# Patient Record
Sex: FEMALE | Race: WHITE | NOT HISPANIC OR LATINO | Employment: FULL TIME | URBAN - METROPOLITAN AREA
[De-identification: names, ages, dates, MRNs, and addresses within clinical notes are randomized per-mention and may not be internally consistent; named-entity substitution may affect disease eponyms.]

---

## 2014-02-13 LAB — BNP SERPL-MCNC: 310 PG/ML

## 2015-10-29 LAB
CHOLEST SERPL-MCNC: 186 MG/DL
HDLC SERPL-MCNC: 66 MG/DL
LDLC SERPL CALC-MCNC: 104 MG/DL
NONHDLC SERPL-MCNC: 120 MG/DL
TRIGL SERPL-MCNC: 81 MG/DL

## 2017-01-05 ENCOUNTER — TRANSFERRED RECORDS (OUTPATIENT)
Dept: HEALTH INFORMATION MANAGEMENT | Facility: CLINIC | Age: 57
End: 2017-01-05

## 2017-01-05 LAB
ABSOLUTE BASOPHILS (EXTERNAL): 0.02 X10(9)/L (ref 0–0.3)
EOSINOPHIL # BLD AUTO: 0.1 X10(9)/L (ref 0.05–0.5)
ERYTHROCYTE [DISTWIDTH] IN BLOOD BY AUTOMATED COUNT: 12.3 % (ref 11.9–15.5)
HCT VFR BLD AUTO: 38.3 % (ref 34.9–44.5)
HEMOGLOBIN: 13 G/DL (ref 12–15.5)
LYMPHOCYTES # BLD AUTO: 1.09 X10(9)/L (ref 0.9–2.9)
MCH RBC QN AUTO: NORMAL PG
MCHC RBC AUTO-ENTMCNC: NORMAL G/DL
MCV RBC AUTO: 92.7 FL (ref 81.6–98.3)
MONOCYTES # BLD AUTO: 0.34 X10(9)/L (ref 0.3–0.9)
NEUTROPHILS # BLD AUTO: 5.16 X10(9)/L (ref 1.7–7)
PLATELET COUNT - QUEST: NORMAL 10^9/L (ref 150–450)
RBC # BLD AUTO: 4.13 10^12/L (ref 3.9–5.03)
WBC # BLD AUTO: 6.7 10^9/L (ref 3.5–10.5)

## 2017-02-09 ENCOUNTER — TRANSFERRED RECORDS (OUTPATIENT)
Dept: HEALTH INFORMATION MANAGEMENT | Facility: CLINIC | Age: 57
End: 2017-02-09

## 2017-02-09 LAB
ALBUMIN SERPL-MCNC: 3.8 G/DL (ref 3.5–5.7)
ALP SERPL-CCNC: 48 U/L (ref 34–104)
ALT SERPL-CCNC: 27 U/L (ref 7–52)
ANION GAP SERPL CALCULATED.3IONS-SCNC: 12.7 MMOL/L (ref 8–16)
AST SERPL-CCNC: 31 U/L (ref 13–39)
BASOPHILS NFR BLD AUTO: 1 % (ref 0–2)
BILIRUB SERPL-MCNC: 0.5 MG/DL (ref 0–1)
BUN SERPL-MCNC: 10 MG/DL (ref 7–25)
CALCIUM SERPL-MCNC: 8.7 MG/DL (ref 8.6–10.8)
CHLORIDE SERPLBLD-SCNC: 106 MMOL/L (ref 98–107)
CO2 SERPL-SCNC: 24 MMOL/L (ref 21–31)
CREAT SERPL-MCNC: 0.93 MG/DL (ref 0.6–1.2)
EOSINOPHIL NFR BLD AUTO: 2 % (ref 0–4)
ERYTHROCYTE [DISTWIDTH] IN BLOOD BY AUTOMATED COUNT: 12 % (ref 11.9–15.5)
GFR SERPL CREATININE-BSD FRML MDRD: >60 ML/MIN/1.73M2
GLUCOSE SERPL-MCNC: 103 MG/DL (ref 70–99)
HCT VFR BLD AUTO: 37.6 % (ref 36–46)
HEMOGLOBIN: 12.8 G/DL (ref 12–16)
INR PPP: 1.1
LYMPHOCYTES NFR BLD AUTO: 36 % (ref 20–44)
MAGNESIUM SERPL-MCNC: 2 MG/DL (ref 1.9–2.7)
MCH RBC QN AUTO: 31 PG (ref 26–34)
MCHC RBC AUTO-ENTMCNC: 34 G/DL (ref 31–37)
MCV RBC AUTO: 92 FL (ref 80–100)
MONOCYTES NFR BLD AUTO: 10 % (ref 2–9)
NEUTROPHILS NFR BLD AUTO: 47 % (ref 50–70)
PLATELET COUNT - QUEST: 174 10^9/L (ref 150–440)
POTASSIUM SERPL-SCNC: 3.7 MMOL/L (ref 3.5–5.1)
PROT SERPL-MCNC: 6 G/DL (ref 6.4–8.3)
PROTHROMBIN TIME: 11.9 SECONDS (ref 9.9–11.8)
RBC # BLD AUTO: 4.11 10^12/L (ref 4.2–5.4)
SODIUM SERPL-SCNC: 139 MMOL/L (ref 136–145)
TSH SERPL-ACNC: 2.8 MCU/ML (ref 0.34–5.6)
WBC # BLD AUTO: 3.3 10^9/L (ref 4.5–11)

## 2017-02-10 ENCOUNTER — TRANSFERRED RECORDS (OUTPATIENT)
Dept: HEALTH INFORMATION MANAGEMENT | Facility: CLINIC | Age: 57
End: 2017-02-10

## 2017-05-16 ENCOUNTER — TRANSFERRED RECORDS (OUTPATIENT)
Dept: HEALTH INFORMATION MANAGEMENT | Facility: CLINIC | Age: 57
End: 2017-05-16

## 2017-06-27 ENCOUNTER — CARE COORDINATION (OUTPATIENT)
Dept: CARDIOLOGY | Facility: CLINIC | Age: 57
End: 2017-06-27

## 2017-06-27 DIAGNOSIS — Z98.890 HISTORY OF PULMONIC VALVULOTOMY: ICD-10-CM

## 2017-06-27 DIAGNOSIS — Z87.74 H/O ATRIAL SEPTAL DEFECT REPAIR: ICD-10-CM

## 2017-06-27 DIAGNOSIS — Z87.74 S/P PDA REPAIR: Primary | ICD-10-CM

## 2017-10-02 ENCOUNTER — PRE VISIT (OUTPATIENT)
Dept: CARDIOLOGY | Facility: CLINIC | Age: 57
End: 2017-10-02

## 2017-10-02 DIAGNOSIS — Q24.9 CONGENITAL ANOMALIES OF THE HEART: ICD-10-CM

## 2017-10-02 DIAGNOSIS — I48.92 ATRIAL FLUTTER (H): Primary | ICD-10-CM

## 2017-10-02 RX ORDER — LABETALOL 200 MG/1
300 TABLET, FILM COATED ORAL 2 TIMES DAILY
Status: ON HOLD | COMMUNITY
End: 2018-04-09

## 2017-10-02 RX ORDER — FERROUS SULFATE 325(65) MG
325 TABLET ORAL EVERY OTHER DAY
COMMUNITY
End: 2024-09-03

## 2017-10-13 ENCOUNTER — OFFICE VISIT (OUTPATIENT)
Dept: CARDIOLOGY | Facility: CLINIC | Age: 57
End: 2017-10-13
Attending: INTERNAL MEDICINE
Payer: COMMERCIAL

## 2017-10-13 ENCOUNTER — HOSPITAL ENCOUNTER (OUTPATIENT)
Dept: MRI IMAGING | Facility: CLINIC | Age: 57
End: 2017-10-13
Attending: INTERNAL MEDICINE
Payer: COMMERCIAL

## 2017-10-13 ENCOUNTER — HOSPITAL ENCOUNTER (OUTPATIENT)
Dept: MRI IMAGING | Facility: CLINIC | Age: 57
Discharge: HOME | End: 2017-10-13
Attending: INTERNAL MEDICINE | Admitting: INTERNAL MEDICINE
Payer: COMMERCIAL

## 2017-10-13 VITALS
OXYGEN SATURATION: 99 % | WEIGHT: 160.94 LBS | HEIGHT: 66 IN | BODY MASS INDEX: 25.86 KG/M2 | SYSTOLIC BLOOD PRESSURE: 132 MMHG | DIASTOLIC BLOOD PRESSURE: 84 MMHG | HEART RATE: 76 BPM

## 2017-10-13 VITALS
OXYGEN SATURATION: 99 % | HEIGHT: 66 IN | BODY MASS INDEX: 25.86 KG/M2 | HEART RATE: 76 BPM | WEIGHT: 160.9 LBS | DIASTOLIC BLOOD PRESSURE: 84 MMHG | SYSTOLIC BLOOD PRESSURE: 132 MMHG

## 2017-10-13 DIAGNOSIS — Z98.890 HISTORY OF PULMONIC VALVULOTOMY: ICD-10-CM

## 2017-10-13 DIAGNOSIS — Z87.74 H/O ATRIAL SEPTAL DEFECT REPAIR: ICD-10-CM

## 2017-10-13 DIAGNOSIS — Z87.74 S/P PDA REPAIR: ICD-10-CM

## 2017-10-13 DIAGNOSIS — Z87.74 STATUS POST ATRIAL SEPTAL DEFECT REPAIR: ICD-10-CM

## 2017-10-13 DIAGNOSIS — Z98.890 S/P PULMONARY VALVULOTOMY: Primary | ICD-10-CM

## 2017-10-13 DIAGNOSIS — I48.0 PAROXYSMAL ATRIAL FIBRILLATION (H): Primary | ICD-10-CM

## 2017-10-13 DIAGNOSIS — I48.3 TYPICAL ATRIAL FLUTTER (H): ICD-10-CM

## 2017-10-13 LAB
ALBUMIN SERPL-MCNC: 3.8 G/DL (ref 3.4–5)
ALP SERPL-CCNC: 66 U/L (ref 40–150)
ALT SERPL W P-5'-P-CCNC: 28 U/L (ref 0–50)
ANION GAP SERPL CALCULATED.3IONS-SCNC: 6 MMOL/L (ref 3–14)
AST SERPL W P-5'-P-CCNC: 24 U/L (ref 0–45)
BASOPHILS # BLD AUTO: 0 10E9/L (ref 0–0.2)
BASOPHILS NFR BLD AUTO: 0.4 %
BILIRUB SERPL-MCNC: 0.6 MG/DL (ref 0.2–1.3)
BUN SERPL-MCNC: 11 MG/DL (ref 7–30)
CALCIUM SERPL-MCNC: 8.8 MG/DL (ref 8.5–10.1)
CHLORIDE SERPL-SCNC: 102 MMOL/L (ref 94–109)
CHOLEST SERPL-MCNC: 178 MG/DL
CO2 SERPL-SCNC: 28 MMOL/L (ref 20–32)
CREAT SERPL-MCNC: 0.88 MG/DL (ref 0.52–1.04)
DIFFERENTIAL METHOD BLD: NORMAL
EOSINOPHIL # BLD AUTO: 0.1 10E9/L (ref 0–0.7)
EOSINOPHIL NFR BLD AUTO: 2 %
ERYTHROCYTE [DISTWIDTH] IN BLOOD BY AUTOMATED COUNT: 12.1 % (ref 10–15)
GFR SERPL CREATININE-BSD FRML MDRD: 67 ML/MIN/1.7M2
GLUCOSE SERPL-MCNC: 87 MG/DL (ref 70–99)
HCT VFR BLD AUTO: 38.4 % (ref 35–47)
HDLC SERPL-MCNC: 71 MG/DL
HGB BLD-MCNC: 12.6 G/DL (ref 11.7–15.7)
IMM GRANULOCYTES # BLD: 0 10E9/L (ref 0–0.4)
IMM GRANULOCYTES NFR BLD: 0.2 %
LDLC SERPL CALC-MCNC: 90 MG/DL
LYMPHOCYTES # BLD AUTO: 1.1 10E9/L (ref 0.8–5.3)
LYMPHOCYTES NFR BLD AUTO: 20.4 %
MCH RBC QN AUTO: 31.3 PG (ref 26.5–33)
MCHC RBC AUTO-ENTMCNC: 32.8 G/DL (ref 31.5–36.5)
MCV RBC AUTO: 96 FL (ref 78–100)
MONOCYTES # BLD AUTO: 0.4 10E9/L (ref 0–1.3)
MONOCYTES NFR BLD AUTO: 6.8 %
NEUTROPHILS # BLD AUTO: 3.8 10E9/L (ref 1.6–8.3)
NEUTROPHILS NFR BLD AUTO: 70.2 %
NONHDLC SERPL-MCNC: 106 MG/DL
NRBC # BLD AUTO: 0 10*3/UL
NRBC BLD AUTO-RTO: 0 /100
PLATELET # BLD AUTO: 152 10E9/L (ref 150–450)
POTASSIUM SERPL-SCNC: 4 MMOL/L (ref 3.4–5.3)
PROT SERPL-MCNC: 7.4 G/DL (ref 6.8–8.8)
RBC # BLD AUTO: 4.02 10E12/L (ref 3.8–5.2)
SODIUM SERPL-SCNC: 136 MMOL/L (ref 133–144)
TRIGL SERPL-MCNC: 81 MG/DL
TSH SERPL DL<=0.005 MIU/L-ACNC: 3.61 MU/L (ref 0.4–4)
WBC # BLD AUTO: 5.5 10E9/L (ref 4–11)

## 2017-10-13 PROCEDURE — 99204 OFFICE O/P NEW MOD 45 MIN: CPT | Mod: 25 | Performed by: INTERNAL MEDICINE

## 2017-10-13 PROCEDURE — 99212 OFFICE O/P EST SF 10 MIN: CPT | Mod: ZF

## 2017-10-13 PROCEDURE — 85025 COMPLETE CBC W/AUTO DIFF WBC: CPT | Performed by: INTERNAL MEDICINE

## 2017-10-13 PROCEDURE — 36415 COLL VENOUS BLD VENIPUNCTURE: CPT | Performed by: INTERNAL MEDICINE

## 2017-10-13 PROCEDURE — 93010 ELECTROCARDIOGRAM REPORT: CPT | Mod: ZP | Performed by: INTERNAL MEDICINE

## 2017-10-13 PROCEDURE — 80061 LIPID PANEL: CPT | Performed by: INTERNAL MEDICINE

## 2017-10-13 PROCEDURE — 93005 ELECTROCARDIOGRAM TRACING: CPT | Mod: ZF

## 2017-10-13 PROCEDURE — 25000128 H RX IP 250 OP 636: Performed by: INTERNAL MEDICINE

## 2017-10-13 PROCEDURE — 80053 COMPREHEN METABOLIC PANEL: CPT | Performed by: INTERNAL MEDICINE

## 2017-10-13 PROCEDURE — 71555 MRI ANGIO CHEST W OR W/O DYE: CPT

## 2017-10-13 PROCEDURE — 75561 CARDIAC MRI FOR MORPH W/DYE: CPT

## 2017-10-13 PROCEDURE — 99213 OFFICE O/P EST LOW 20 MIN: CPT

## 2017-10-13 PROCEDURE — 84443 ASSAY THYROID STIM HORMONE: CPT | Performed by: INTERNAL MEDICINE

## 2017-10-13 PROCEDURE — A9585 GADOBUTROL INJECTION: HCPCS | Performed by: INTERNAL MEDICINE

## 2017-10-13 RX ORDER — GADOBUTROL 604.72 MG/ML
7.5 INJECTION INTRAVENOUS ONCE
Status: COMPLETED | OUTPATIENT
Start: 2017-10-13 | End: 2017-10-13

## 2017-10-13 RX ADMIN — GADOBUTROL 7.5 ML: 604.72 INJECTION INTRAVENOUS at 09:52

## 2017-10-13 ASSESSMENT — PAIN SCALES - GENERAL
PAINLEVEL: NO PAIN (0)
PAINLEVEL: NO PAIN (0)

## 2017-10-13 NOTE — MR AVS SNAPSHOT
"              After Visit Summary   10/13/2017    Heydi Kelley    MRN: [id number]           Patient Information     Date Of Birth          1960        Visit Information        Provider Department      10/13/2017 12:00 PM Madhu Mccollum MD [facility]        Today's Diagnoses     S/P pulmonary valvulotomy    -  1      Care Instructions    You were seen today in the Adult Congenital and Cardiovascular Genetics Clinic at the [facility].    Cardiology Providers you saw during your visit:  Dr Olivia Mccollum and Dr Rajiv Mitchell    Diagnosis:  History of pulmonary valvotomy, A flutter    Results:  Dr Mccollum reviewed the results of your testing in clinic today    Recommendations:    1.  Continue to eat a heart healthy, low salt diet.  2.  Continue to get 20-30 minutes of aerobic activity, 4-5 days per week.  Examples of aerobic activity include walking, running, swimming, cycling, etc.  3.  Continue to observe good oral hygiene, with regular dental visits.  4.  We will schedule you for a cardiopulmonary stress test.  For this, nothing to eat or drink 3 hours prior.  No caffeine, alcohol or tobacco 12 hours prior.  Please take all of your medications as prescribed the day of your test.  If you would like this to be done at [place], please let us know and we will fax the order there.        Vitals:    10/13/17 1150   BP: 132/84   BP Location: Right arm   Patient Position: Chair   Cuff Size: Adult Regular   Pulse: 76   SpO2: 99%   Weight: 73 kg (160 lb 15 oz)   Height: 1.676 m (5' 6\")       SBE prophylaxis:   Yes____  No__x__    Lifelong Bacterial Endocarditis Prophylaxis:  YES____  NO____    If YES is checked, follow the recommendations outlined below:  1. Take antibiotic(s) prior to interventional procedures or surgeries (dental, respiratory, urologic, gastrointestinal, gynecologic), or instrumental examinations.   2. Observe good oral hygiene daily, as advised by your dentist. Get regular " professional dental care.  3. Keep cuts clean.  4. Infections should be treated promptly.      Exercise restrictions:   Yes____  No__x__         If yes, list restrictions:  Must be allowed to rest if fatigued or SOB      Work restrictions:  Yes____  No__x__         If yes, list restrictions:    FASTING CHOLESTEROL was checked in the last 5 years YES_x__  NO___  2017  Continue to eat a heart healthy, low salt diet.         ____ Fasting lipid panel order today         ____ No changes in medications          ____ I recommend the following changes in your cholesterol medications.:          ____ Please follow up for cholesterol screening at your primary care physician        Follow-up:  Follow up with Dr Mccollum in 6 months with a cardiopulmonary stress test    For after hours urgent needs, call [phone] and ask to speak to the Adult Congenital Physician on call.  Mention Job Code 0401.    For emergencies call 911.    For any scheduling needs, please call Rosalio Sotomayor, Procedure , at [phone]  Thank you for your visit today!  If you have questions or concerns about today's visit, please call me.    Tru Peterson RN, BSN  Cardiology Care Coordinator  [facility]  [phone]    [address]  Mail Code [id number]  [place] [address]            Follow-ups after your visit        Your next 10 appointments already scheduled     Apr 13, 2018 10:30 AM CDT   Card Cardpul Stress Tst Adult with [facility]   [facility] ([facility])    [address]  [facility] [address]               Apr 13, 2018  1:00 PM CDT   (Arrive by 12:45 PM)   RETURN CONGENITAL HEART with Madhu Mccollum MD   [facility] ([facility])    [address]  3rd Floor  [facility] [address] [phone]              Future tests that were ordered for you today     Open Future Orders        Priority  "Expected Expires Ordered    Card Cardiopulmonary stress test - adult Routine  11/27/2018 10/13/2017            Who to contact     If you have questions or need follow up information about today's clinic visit or your schedule please contact [facility] directly at [phone].  Normal or non-critical lab and imaging results will be communicated to you by MyChart, letter or phone within 4 business days after the clinic has received the results. If you do not hear from us within 7 days, please contact the clinic through [employer] or phone. If you have a critical or abnormal lab result, we will notify you by phone as soon as possible.  Submit refill requests through [employer] or call your pharmacy and they will forward the refill request to us. Please allow 3 business days for your refill to be completed.          Additional Information About Your Visit        [employer] Information     [employer] gives you secure access to your electronic health record. If you see a primary care provider, you can also send messages to your care team and make appointments. If you have questions, please call your primary care clinic.  If you do not have a primary care provider, please call [phone] and they will assist you.        Care EveryWhere ID     This is your Care EveryWhere ID. This could be used by other organizations to access your [place] medical records  [id number]        Your Vitals Were     Pulse Height Pulse Oximetry BMI (Body Mass Index)          76 1.676 m (5' 6\") 99% 25.98 kg/m2         Blood Pressure from Last 3 Encounters:   10/13/17 132/84   10/13/17 132/84    Weight from Last 3 Encounters:   10/13/17 73 kg (160 lb 15 oz)   10/13/17 73 kg (160 lb 14.4 oz)               Primary Care Provider Office Phone # Fax #    Nora Elias [phone]       [address] [address]        Equal Access to Services     GIL FINLEY: galen Chen, " nataly larsenalonel pendletonbryn leeerica cho. So [facility] [phone].    ATENCIÓN: Si habla rayna, tiene a burgos disposición servicios gratuitos de asistencia lingüística. Llame al [phone].    We comply with applicable federal civil rights laws and [place] laws. We do not discriminate on the basis of race, color, national origin, age, disability, sex, sexual orientation, or gender identity.            Thank you!     Thank you for choosing [facility]  for your care. Our goal is always to provide you with excellent care. Hearing back from our patients is one way we can continue to improve our services. Please take a few minutes to complete the written survey that you may receive in the mail after your visit with us. Thank you!             Your Updated Medication List - Protect others around you: Learn how to safely use, store and throw away your medicines at www.disposemymeds.org.          This list is accurate as of: 10/13/17  1:22 PM.  Always use your most recent med list.                   Brand Name Dispense Instructions for use Diagnosis    ferrous sulfate 325 (65 FE) MG tablet    IRON     Take 325 mg by mouth every other day        labetalol 200 MG tablet    NORMODYNE     Take 300 mg by mouth 2 times daily        XARELTO PO      Take 20 mg by mouth daily (with dinner)

## 2017-10-13 NOTE — LETTER
10/13/2017      RE: Heydi Kelley  [address] [place] [address]       Dear Colleague,    Thank you for the opportunity to participate in the care of your patient, Heydi Kelley, at the [facility]. Please see a copy of my visit note below.    Electrophysiology Clinic Note  HPI:   Ms. Kelley is a 57 year old female who has a past medical history significant for congenital pulmonary stenosis ASD and PDA s/p multiple pulmonary valvulotomy (1962, 1964, 1968, and 1983) with closure of PDA 1968 and closure on ASD 1983,now with severe pulmonary valve insufficiency, HTN, HLD, scoliosis, and PAF/AFL (CHADSVASC 2 on Xarelto) s/p DCCVs last 2017. She presents today to establish care with A[facility]D program re her pulmonary valve and AF/AFL.      She has now known severe pulmonary valve insufficiency. Her RV size and function and remained normal. She is without activity limitations. She began to develop AF/AFL in 2016. She was seen by EP at [facility] who started her on Xarelto. She then underwent cardiac event monitoring which showed brief paroxysms of AT. She then had recurrences of AF/AFL and had DCCV in 7/2016 and 2/2017. Given her atrial arrhythmias and severe RA enlargement, decision was made to replace pulmonary valve. This was scheduled to be done in 1/2017 at [facility], but when she followed up in just prior to valve surgery decision was made to cancel surgery considering no recurrent events since her DCCV in 7/2016. She then had another episode of AF/AFL requiring DCCV in 2/2017. She reports symptoms of palpitations and decreased stamina when in AF/AFL. Of note, she did present to OSH ER once with symptoms of palpitation and was found to be in sinus; however, the other times she was in AF/AFL. Review of 12 lead ECGs show she has had both AF (7/2016) and AFL (2/2017). Her last cardiopulmonary stress test, however, was at [facility] in 02/2014, and she went 6.7  "minutes with a functional aerobic capacity of 72.8%.  Her RER was 1.15, peak VO2 was 73% of predicted at 20.1 mL/kg/minute.  She had normal blood pressure and heart rate response to exercise. She has had longstanding HTN which has been under adequate control with labetalol. CMRI today shows moderate to severe pulmonic insufficiency due to restricted closure of the right pulmoniccusp. There is branch stenosis of the proximal left pulmonary artery (1.0 x 0.8 cm). Borderline reduced LV systolic function (LVEF 55%) and normal RV systolic function (RVEF 75%). She reports feeling well today. She denies any chest pain/pressures, dizziness, lightheadedness, shortness of breath, leg/ankle swelling, PND, orthopnea, or syncopal symptoms. Presenting 12 lead ECG shows SR with PACs Vent Rate 59 bpm,  ms, QRS 76 ms, QTc 417 ms. Current cardiac medications include: Xarelto and Labetalol.        PAST MEDICAL HISTORY:  Past Medical History:   Diagnosis Date     Atrial flutter (H)      Hyperlipidemia      Hypertension      Pulmonary artery stenosis      S/P atrial septal defect closure      S/P PDA repair      S/P pulmonary valvulotomy        CURRENT MEDICATIONS:  Current Outpatient Prescriptions   Medication Sig Dispense Refill     ferrous sulfate (IRON) 325 (65 FE) MG tablet Take 325 mg by mouth every other day       labetalol (NORMODYNE) 200 MG tablet Take 300 mg by mouth 2 times daily       Rivaroxaban (XARELTO PO) Take 20 mg by mouth daily (with dinner)         PAST SURGICAL HISTORY:  s/p multiple pulmonary valvulotomy (1962, 1964, 1968, and 1983) with closure of PDA 1968 and closure on ASD 1983  ALLERGIES:   Not on File    FAMILY HISTORY:  Early CAD    SOCIAL HISTORY:  Social History   Substance Use Topics     Smoking status: Never Smoker     Smokeless tobacco: Never Used     Alcohol use Not on file       ROS:   A comprehensive 10 point review of systems negative other than as mentioned in HPI.  Exam:  Ht 1.676 m (5' 6\")  " Wt 73 kg (160 lb 14.4 oz)  BMI 25.97 kg/m2  GENERAL APPEARANCE: healthy, alert and no distress  HEENT: no icterus, no xanthelasmas, normal pupil size and reaction, normal palate, mucosa moist, no central cyanosis  NECK: no adenopathy, no asymmetry, masses, or scars, thyroid normal to palpation and no bruits, JVP not elevated  RESPIRATORY: lungs clear to auscultation - no rales, rhonchi or wheezes, no use of accessory muscles, no retractions, respirations are unlabored, normal respiratory rate  CARDIOVASCULAR: regular rhythm, normal S1 with physiologic split S2, no S3 or S4 and II/IV diastolic murmur.  ABDOMEN: soft, non tender, bowel sounds normal  EXTREMITIES: peripheral pulses normal, no edema, no bruits  NEURO: alert and oriented to person/place/time, normal speech, gait and affect  SKIN: no ecchymoses, no rashes    Labs:  Reviewed.     Testing/Procedures:  10/2017 CMRI:  Comparison CMR: none     1. The LV is normal in cavity size and wall thickness. The global systolic function is borderline reduced.  The LVEF is 55%. There are no regional wall motion abnormalities.     2. The RV is normal in cavity size. The global systolic function is normal. The RVEF is 75%.      3. Moderate right atrial enlargement. Normal left atrial size.     4. There is moderate to severe pulmonic insufficiency. The pulmonic valve is tricuspid. There is restricted  closure of the right cusp.     5. Late gadolinium enhancement imaging shows no MI, fibrosis or infiltrative disease.      MRA Pulmonary Artery     1. The main pulmonary artery and proximal right pulmonary artery are normal size. There is a severe focal  stenosis of the proximal left pulmonary artery (the lumen measures 1.0 x 0.8 cm).     CONCLUSIONS: Moderate to severe pulmonic insufficiency due to restricted closure of the right pulmonic  cusp. There is branch stenosis of the proximal left pulmonary artery (1.0 x 0.8 cm). Borderline reduced LV  systolic function (LVEF 55%)  and normal RV systolic function (RVEF 75%).    Assessment and Plan:   Ms. Kelley is a 57 year old female who has a past medical history significant for congenital pulmonary stenosis ASD and PDA s/p multiple pulmonary valvulotomy (, , , and ) with closure of PDA  and closure on ASD ,now with severe pulmonary valve insufficiency, HTN, HLD, scoliosis, and PAF/AFL (CHADSVASC 2 on Xarelto) s/p DCCVs last 2017. She presents today to establish care with [facility]D program re her pulmonary valve and AF/AFL.  She has now known severe pulmonary valve insufficiency. Her RV size and function and remained normal. She began to develop AF/AFL in  requiring DCCV in 2016 and 2017. CMRI today shows moderate to severe pulmonic insufficiency due to restricted closure of the right pulmoniccusp. There is branch stenosis of the proximal left pulmonary artery (1.0 x 0.8 cm). Borderline reduced LV systolic function (LVEF 55%) and normal RV systolic function (RVEF 75%).     We discussed in detail with the patient management/treatment options for Catarina includin. Stroke Prophylaxis:  CHADSVASC=+HTN, +gender  2, corresponding to a 2.2% annual stroke / systemic emolism event rate. indicating need for long term oral anticoagulation.  She is appropriately on Xarelto. No bleeding issues.   2. Rate Control: Continue labetalol .   3. Rhythm Control: Cardioversion, Antiarrhythmics and/or ablation are options for rhythm control. She has had DCCV for AF 2016 and DCCV for AFL 2017. We discussed that is she developed frequent episodes of AF we would consider starting AAT. If she develops recurrent AFL, we would prefer ablation for management. At this time, we would not perform any further interventions given infrequency of her occurances.   4. Risk Factor Management: maintain tight BP control, and BEV evaluation as indicated.       She has also established care today with Dr. Mccollum regarding her pulmonary insufficiency  who plans to perform CPX and follow up with patient in 6 months.   We will plan to see patient as needed.     The patient states understanding and is agreeable with plan.   This patient was seen and evaluated with Dr. Mitchell. The above note reflects our joint assessment and plan.   JOSUE Salinas CNP  Pager: [phone]    EP STAFF NOTE  I have seen and examined the patient as part of a shared visit with Paulina Meehan, FRANCIS NP.  I agree with the note above. I reviewed today's vital signs and medications. I have reviewed and discussed with the advanced practice provider their physical examination, assessment, and plan   Briefly, previous ASD/PDA and mainly PS s/p multiple valvulotomies, now with severe PI and atrial arrhythmias, mostly one afib episode and maybe 2 AFL episodes, last in Jan-Feb 2017, symptomatic, well rate controled.  My key history/exam findings are: RRR.   The key management decisions made by me: potential candidate for PV replacement (surgical versus transcatheter), seeing Dr Mccollum for that consideration. We discussed AAT and ablation of recurrences, if ends up having surgical PV replacement, can consider MAZE with CTI cryo too, if transcatheter then would wait for recurrence to treat based on rhythm as above..    Rajiv Mitchell MD [facility]  Cardiology - Electrophysiology    CC  SELF, REFERRED

## 2017-10-13 NOTE — PATIENT INSTRUCTIONS
"You were seen today in the Adult Congenital and Cardiovascular Genetics Clinic at the [facility].    Cardiology Providers you saw during your visit:  Dr Olivia Mccollum and Dr Rajiv Mitchell    Diagnosis:  History of pulmonary valvotomy, A flutter    Results:  Dr Mccollum reviewed the results of your testing in clinic today    Recommendations:    1.  Continue to eat a heart healthy, low salt diet.  2.  Continue to get 20-30 minutes of aerobic activity, 4-5 days per week.  Examples of aerobic activity include walking, running, swimming, cycling, etc.  3.  Continue to observe good oral hygiene, with regular dental visits.  4.  We will schedule you for a cardiopulmonary stress test.  For this, nothing to eat or drink 3 hours prior.  No caffeine, alcohol or tobacco 12 hours prior.  Please take all of your medications as prescribed the day of your test.  If you would like this to be done at [place], please let us know and we will fax the order there.        Vitals:    10/13/17 1150   BP: 132/84   BP Location: Right arm   Patient Position: Chair   Cuff Size: Adult Regular   Pulse: 76   SpO2: 99%   Weight: 73 kg (160 lb 15 oz)   Height: 1.676 m (5' 6\")       SBE prophylaxis:   Yes____  No__x__    Lifelong Bacterial Endocarditis Prophylaxis:  YES____  NO____    If YES is checked, follow the recommendations outlined below:  1. Take antibiotic(s) prior to interventional procedures or surgeries (dental, respiratory, urologic, gastrointestinal, gynecologic), or instrumental examinations.   2. Observe good oral hygiene daily, as advised by your dentist. Get regular professional dental care.  3. Keep cuts clean.  4. Infections should be treated promptly.      Exercise restrictions:   Yes____  No__x__         If yes, list restrictions:  Must be allowed to rest if fatigued or SOB      Work restrictions:  Yes____  No__x__         If yes, list restrictions:    FASTING CHOLESTEROL was checked in the last 5 years YES_x__  NO___  " 2017  Continue to eat a heart healthy, low salt diet.         ____ Fasting lipid panel order today         ____ No changes in medications          ____ I recommend the following changes in your cholesterol medications.:          ____ Please follow up for cholesterol screening at your primary care physician        Follow-up:  Follow up with Dr Mccollum in 6 months with a cardiopulmonary stress test    For after hours urgent needs, call [phone] and ask to speak to the Adult Congenital Physician on call.  Mention Job Code 0401.    For emergencies call 911.    For any scheduling needs, please call Rosalio Sotomayor Procedure , at [phone]  Thank you for your visit today!  If you have questions or concerns about today's visit, please call me.    Tru Peterson RN, BSN  Cardiology Care Coordinator  [facility]  [phone]    [address]  Mail Code [id number]  [place] [address]

## 2017-10-13 NOTE — NURSING NOTE
Cardiac Testing: Patient given instructions regarding CPX. Discussed purpose, preparation, procedure and when to expect results reported back to the patient. Patient demonstrated understanding of this information and agreed to call with further questions or concerns.    Med Reconcile: Reviewed and verified all current medications with the patient. The updated medication list was printed and given to the patient.    Labs: Patient was given results of the laboratory testing obtained today. Patient demonstrated understanding of this information and agreed to call with further questions or concerns.       Return Appointment: Follow up with Dr Mccollum in 6 mo with CPX.  Patient given instructions regarding scheduling next clinic visit. Patient demonstrated understanding of this information and agreed to call with further questions or concerns.    Patient stated she understood all health information given and agreed to call with further questions or concerns.    Tru Peterson, RN  RN Care Coordinator  [facility]  [phone]

## 2017-10-13 NOTE — PATIENT INSTRUCTIONS
"You were seen today in the Adult Congenital and Cardiovascular Genetics Clinic at the [facility].    Cardiology Providers you saw during your visit:  Dr Olivia Mccollum and Dr Rajiv Mitchell    Diagnosis:  History of pulmonary valvotomy, A flutter    Results:  Dr Mccollum reviewed the results of your testing in clinic today    Recommendations:    1.  Continue to eat a heart healthy, low salt diet.  2.  Continue to get 20-30 minutes of aerobic activity, 4-5 days per week.  Examples of aerobic activity include walking, running, swimming, cycling, etc.  3.  Continue to observe good oral hygiene, with regular dental visits.  4.  We will schedule you for a cardiopulmonary stress test.  For this, nothing to eat or drink 3 hours prior.  No caffeine, alcohol or tobacco 12 hours prior.  Please take all of your medications as prescribed the day of your test.  If you would like this to be done at [place], please let us know and we will fax the order there.        Vitals:    10/13/17 1150   BP: 132/84   BP Location: Right arm   Patient Position: Chair   Cuff Size: Adult Regular   Pulse: 76   SpO2: 99%   Weight: 73 kg (160 lb 15 oz)   Height: 1.676 m (5' 6\")       SBE prophylaxis:   Yes____  No__x__    Lifelong Bacterial Endocarditis Prophylaxis:  YES____  NO____    If YES is checked, follow the recommendations outlined below:  1. Take antibiotic(s) prior to interventional procedures or surgeries (dental, respiratory, urologic, gastrointestinal, gynecologic), or instrumental examinations.   2. Observe good oral hygiene daily, as advised by your dentist. Get regular professional dental care.  3. Keep cuts clean.  4. Infections should be treated promptly.      Exercise restrictions:   Yes____  No__x__         If yes, list restrictions:  Must be allowed to rest if fatigued or SOB      Work restrictions:  Yes____  No__x__         If yes, list restrictions:    FASTING CHOLESTEROL was checked in the last 5 years YES_x__  NO___  " 2017  Continue to eat a heart healthy, low salt diet.         ____ Fasting lipid panel order today         ____ No changes in medications          ____ I recommend the following changes in your cholesterol medications.:          ____ Please follow up for cholesterol screening at your primary care physician        Follow-up:  Follow up with Dr Mccollum in 6 months with a cardiopulmonary stress test    For after hours urgent needs, call [phone] and ask to speak to the Adult Congenital Physician on call.  Mention Job Code 0401.    For emergencies call 911.    For any scheduling needs, please call Rosalio Sotomayor Procedure , at [phone]  Thank you for your visit today!  If you have questions or concerns about today's visit, please call me.    Tru Peterson RN, BSN  Cardiology Care Coordinator  [facility]  [phone]    [address]  Mail [id number]  [place] [address]

## 2017-10-13 NOTE — PROGRESS NOTES
"HPI:     Please see dictated note    PAST MEDICAL HISTORY:  Past Medical History:   Diagnosis Date     Atrial flutter (H)      Hyperlipidemia      Hypertension      Pulmonary artery stenosis      S/P atrial septal defect closure      S/P PDA repair      S/P pulmonary valvulotomy        CURRENT MEDICATIONS:  Current Outpatient Prescriptions   Medication Sig Dispense Refill     ferrous sulfate (IRON) 325 (65 FE) MG tablet Take 325 mg by mouth every other day       labetalol (NORMODYNE) 200 MG tablet Take 300 mg by mouth 2 times daily       Rivaroxaban (XARELTO PO) Take 20 mg by mouth daily (with dinner)         PAST SURGICAL HISTORY:  No past surgical history on file.    ALLERGIES   Not on File    FAMILY HISTORY:  No family history on file.    SOCIAL HISTORY:  Social History     Social History     Marital status:      Spouse name: N/A     Number of children: N/A     Years of education: N/A     Social History Main Topics     Smoking status: Never Smoker     Smokeless tobacco: Never Used     Alcohol use Not on file     Drug use: Not on file     Sexual activity: Not on file     Other Topics Concern     Not on file     Social History Narrative       ROS:   Constitutional: No fever, chills, or sweats. No weight gain/loss   ENT: No visual disturbance, ear ache, epistaxis, sore throat  Allergies/Immunologic: Negative.   Respiratory: No cough, hemoptysia  Cardiovascular: As per HPI  GI: No nausea, vomiting, hematemesis, melena, or hematochezia  : No urinary frequency, dysuria, or hematuria  Integument: Negative  Psychiatric: Negative  Neuro: Negative  Endocrinology: Negative   Musculoskeletal: Negative    EXAM:  /84 (BP Location: Right arm, Patient Position: Chair, Cuff Size: Adult Regular)  Pulse 76  Ht 1.676 m (5' 6\")  Wt 73 kg (160 lb 15 oz)  SpO2 99%  BMI 25.98 kg/m2  In general, the patient is a pleasant female in no apparent distress.    HEENT: NC/AT.  PERRLA.  EOMI.  Sclerae white, not injected.  " Nares clear.  Pharynx without erythema or exudate.  Dentition intact.    Neck:  Carotids +4/4 bilaterally without bruits.  No jugular venous distension.   Heart: RRR. Normal S1, S2 splits physiologically. There is no heave or lift. There is a 2/4 diastolic murmur best heard in the RUSB.  Lungs: CTA.  No ronchi, wheezes, rales.    Abdomen: Soft, nontender, nondistended.  Extremities: No clubbing, cyanosis, or edema.  Neurologic: Alert and oriented to person/place/time, normal speech, gait and affect  Skin: No petechiae, purpura or rash.    Labs:  LIPID RESULTS:  Lab Results   Component Value Date    CHOL 178 10/13/2017    HDL 71 10/13/2017    LDL 90 10/13/2017    TRIG 81 10/13/2017    NHDL 106 10/13/2017       LIVER ENZYME RESULTS:  Lab Results   Component Value Date    AST 24 10/13/2017    ALT 28 10/13/2017       CBC RESULTS:  Lab Results   Component Value Date    WBC 5.5 10/13/2017    RBC 4.02 10/13/2017    HGB 12.6 10/13/2017    HCT 38.4 10/13/2017    MCV 96 10/13/2017    MCH 31.3 10/13/2017    MCHC 32.8 10/13/2017    RDW 12.1 10/13/2017     10/13/2017       BMP RESULTS:  Lab Results   Component Value Date     10/13/2017    POTASSIUM 4.0 10/13/2017    CHLORIDE 102 10/13/2017    CO2 28 10/13/2017    ANIONGAP 6 10/13/2017    GLC 87 10/13/2017    BUN 11 10/13/2017    CR 0.88 10/13/2017    GFRESTIMATED 67 10/13/2017    GFRESTBLACK 81 10/13/2017    KEMAR 8.8 10/13/2017        A1C RESULTS:  No results found for: A1C    INR RESULTS:  Lab Results   Component Value Date    INR 1.1 02/09/2017     JOSUE Mccollum MD       CC  Patient Care Team:  Nora Elias NP as PCP - Tru Garcia, RN as Nurse Coordinator (Cardiology)  Madhu Mccollum MD as MD (Cardiology)  SELF, REFERRED

## 2017-10-13 NOTE — NURSING NOTE
Cardiac Testing: Patient given instructions regarding CPX. Discussed purpose, preparation, procedure and when to expect results reported back to the patient. Patient demonstrated understanding of this information and agreed to call with further questions or concerns.    Med Reconcile: Reviewed and verified all current medications with the patient. The updated medication list was printed and given to the patient.    Labs: Patient was given results of the laboratory testing obtained today. Patient demonstrated understanding of this information and agreed to call with further questions or concerns.       Return Appointment: Follow up with Dr Mccollum in 6 mo with CPX.  Patient given instructions regarding scheduling next clinic visit. Patient demonstrated understanding of this information and agreed to call with further questions or concerns.    Patient stated she understood all health information given and agreed to call with further questions or concerns.    Tru Peterson, RN  RN Care Coordinator  [facility]  [phone]

## 2017-10-13 NOTE — LETTER
10/13/2017      RE: Heydi Kelley  [address] [facility] [id number]       Dear Colleague,    Thank you for the opportunity to participate in the care of your patient, Heydi Kelley, at the [facility]. Please see a copy of my visit note below.    HPI:       PAST MEDICAL HISTORY:  Past Medical History:   Diagnosis Date     Atrial flutter (H)      Hyperlipidemia      Hypertension      Pulmonary artery stenosis      S/P atrial septal defect closure      S/P PDA repair      S/P pulmonary valvulotomy        CURRENT MEDICATIONS:  Current Outpatient Prescriptions   Medication Sig Dispense Refill     ferrous sulfate (IRON) 325 (65 FE) MG tablet Take 325 mg by mouth every other day       labetalol (NORMODYNE) 200 MG tablet Take 300 mg by mouth 2 times daily       Rivaroxaban (XARELTO PO) Take 20 mg by mouth daily (with dinner)         PAST SURGICAL HISTORY:  No past surgical history on file.    ALLERGIES   Not on File    FAMILY HISTORY:  No family history on file.    SOCIAL HISTORY:  Social History     Social History     Marital status:      Spouse name: N/A     Number of children: N/A     Years of education: N/A     Social History Main Topics     Smoking status: Never Smoker     Smokeless tobacco: Never Used     Alcohol use Not on file     Drug use: Not on file     Sexual activity: Not on file     Other Topics Concern     Not on file     Social History Narrative       ROS:   Constitutional: No fever, chills, or sweats. No weight gain/loss   ENT: No visual disturbance, ear ache, epistaxis, sore throat  Allergies/Immunologic: Negative.   Respiratory: No cough, hemoptysia  Cardiovascular: As per HPI  GI: No nausea, vomiting, hematemesis, melena, or hematochezia  : No urinary frequency, dysuria, or hematuria  Integument: Negative  Psychiatric: Negative  Neuro: Negative  Endocrinology: Negative   Musculoskeletal: Negative    EXAM:  /84 (BP  "Location: Right arm, Patient Position: Chair, Cuff Size: Adult Regular)  Pulse 76  Ht 1.676 m (5' 6\")  Wt 73 kg (160 lb 15 oz)  SpO2 99%  BMI 25.98 kg/m2  In general, the patient is a pleasant female in no apparent distress.    HEENT: NC/AT.  PERRLA.  EOMI.  Sclerae white, not injected.  Nares clear.  Pharynx without erythema or exudate.  Dentition intact.    Neck:  Carotids +4/4 bilaterally without bruits.  No jugular venous distension.   Heart: RRR. Normal S1, S2 splits physiologically. There is no heave or lift. There is a 2/4 diastolic murmur best heard in the RUSB.  Lungs: CTA.  No ronchi, wheezes, rales.    Abdomen: Soft, nontender, nondistended.  Extremities: No clubbing, cyanosis, or edema.  Neurologic: Alert and oriented to person/place/time, normal speech, gait and affect  Skin: No petechiae, purpura or rash.    Labs:  LIPID RESULTS:  Lab Results   Component Value Date    CHOL 178 10/13/2017    HDL 71 10/13/2017    LDL 90 10/13/2017    TRIG 81 10/13/2017    NHDL 106 10/13/2017       LIVER ENZYME RESULTS:  Lab Results   Component Value Date    AST 24 10/13/2017    ALT 28 10/13/2017       CBC RESULTS:  Lab Results   Component Value Date    WBC 5.5 10/13/2017    RBC 4.02 10/13/2017    HGB 12.6 10/13/2017    HCT 38.4 10/13/2017    MCV 96 10/13/2017    MCH 31.3 10/13/2017    MCHC 32.8 10/13/2017    RDW 12.1 10/13/2017     10/13/2017       BMP RESULTS:  Lab Results   Component Value Date     10/13/2017    POTASSIUM 4.0 10/13/2017    CHLORIDE 102 10/13/2017    CO2 28 10/13/2017    ANIONGAP 6 10/13/2017    GLC 87 10/13/2017    BUN 11 10/13/2017    CR 0.88 10/13/2017    GFRESTIMATED 67 10/13/2017    GFRESTBLACK 81 10/13/2017    KEMAR 8.8 10/13/2017        A1C RESULTS:  No results found for: A1C    INR RESULTS:  Lab Results   Component Value Date    INR 1.1 02/09/2017       HISTORY OF PRESENT ILLNESS:  Ms. Kelley is a pleasant 57-year-old woman who has a past medical history significant for " congenital pulmonary stenosis, atrial septal defect, patent ductus arteriosus.  She underwent her first surgery at 6 weeks of age secondary to a murmur and was found to have critical AS.  This was in 1962.  She had her surgery by Dr. Ross and Dr. Concepcion.  She developed recurrent severe stenosis and had surgery again in 1964 and then again in 1968.  In 1968 it was through a lateral thoracotomy she had patent ductus arteriosus discovered and closed.  Finally, in 1983 she again underwent redo pulmonary valvulotomy and closure of atrial septal defect.  She was followed by Dr. Fowler as a child and started following with [facility] around 2011, where she was under the care of Dr. Larose.      Ms. Kelley has known severe pulmonary valve insufficiency.  However, her right ventricular size and function have been normal.  She was doing well without any concerning symptoms or issues up until 2016 when she had the first episode of atrial fibrillation/flutter.  She was seen by EP at [facility], Dr. Perez, and she was started on anticoagulation.  He put a BodyGuardian on her for remote monitoring, and this showed just brief paroxysmal atrial tachycardia.  Because of her atrial arrhythmias, the decision had been made to replace her pulmonary valve because of the severe right atrial enlargement, and she was scheduled to have this done apparently on 01/07/2017, but when she went for followup she saw Dr. Gill, and they made the decision as she has had no recurrent events since her cardioversion in 07/2016 that they could continue with watchful waiting, so she did not have her valve replaced.  She went on to have another atrial tachycardic event in 02/2017 but has had nothing further since that time.  She reports that she has not been as active as she used to be.  She does walk and does yoga and does not feel that she is limited in what she does.  Her last cardiopulmonary stress test, however, was at [facility] in 02/2014,  and she went 6.7 minutes with a functional aerobic capacity of 72.8%.  Her RER was 1.15, peak VO2 was 73% of predicted at 20.1 mL/kg/minute.  She had normal blood pressure and heart rate response to exercise.      Ms. Kelley denies tobacco use.  She does not have excess caffeine.  She works as a  for the [employer].  She is .  She has 2 children, ages 19 and 16.  She does have a family history of early coronary artery disease with her dad undergoing a CABG in his early 60s.  He was not a smoker and was not diabetic.  She also has a sister, interestingly, with mitral valve prolapse actually needing replacement or repair of her valve.  She denies chest pain or shortness of breath.  She denies palpitations or racing heart.  As part of her evaluation today, she did undergo a cardiac MRI, the results of this are not available, but I have looked at the images personally.  Her right ventricle does not appear to be enlarged.  She does have evidence of pulmonary insufficiency as outlined.  Her lab work including cholesterol was excellent.  TSH was normal, hemoglobin normal, as was her kidney function.  EKG showed sinus rhythm at 69 beats per minute with premature atrial contractions only.      IMPRESSION, REPORT, PLAN:   1.  Congenital pulmonary valve stenosis, status post pulmonary valvuloplasty in 1962, 1964 and 1983 through a median sternotomy, now with severe pulmonary insufficiency with normal right ventricular size and function.   2.  Atrial arrhythmias, initial event in 08/2016 with A-flutter/fib and subsequent event in 02/2016 with severe right atrial enlargement, on Xarelto and labetalol without known recurrence.   3.  History of PDA, status post ligation in 1968 through a lateral thoracotomy at the [facility].   4.  Atrial septal defect, status post closure in 1983 at the [facility].   5.  Hypertension, well controlled.      DISCUSSION:  It was a pleasure being  involved in the care of Mrs. Kelley.  As outlined, I have reviewed her history and symptoms and imaging in detail.  We discussed that with her pulmonary insufficiency, the data that we have in terms of replacing the valve is actually not meant for patients with a history of pulmonary stenosis and valvuloplasties, but rather tetralogy of Fallot, and it is hard to really extrapolate this information in this setting.  There was evidence that she had some impairment when she had her cardiopulmonary stress test in 2014 in which she went 70% of predicted.  We also discussed that her atrial arrhythmias are associated with the pulmonary insufficiency as well.  She has not had a recent cardiopulmonary stress test, and I think that would be helpful to guide us in the next steps.  We did discuss preliminarily pulmonary valve replacement and the types that we do and the timing of doing so.  We also discussed the Hoyt valve as one possibility.  We also had a brief discussion that if she did end up with an open thoracotomy and pulmonary valve replacement that theoretically a Jacqueline valve would be an option in the future.  She understood all of this, is very educated and was glad to have the information.  We also discussed her atrial arrhythmias, and she is under the care of Dr. Mitchell as well, and at this time we are watching her.  I felt that the likelihood of her having recurrent atrial arrhythmias is high, but at this time her burden has been quite low with only 2 events in the past year and a half and that continuing to watch her for a period is not unreasonable.  She does not need antibiotics before dental work.      Finally, we discussed that with all of the information that she has been given that she can think about it and as she is under excellent care at [place], which is closer to her where she lives in [place], but I would be happy to see her back.  We have preliminarily set up a followup visit with a CPX in 6 months,  but she knows that we are always here if she needs anything and we would be happy to see her back sooner if necessary.  All questions were answered.  It was a pleasure to see her.  Please do not hesitate to contact me with any questions or concerns.        Sincerely,     Madhu Mccollum MD      CC  Patient Care Team:  Nora Elias, NP as PCP - General  Tru Peterson, RN as Nurse Coordinator (Cardiology)  Madhu Mccollum MD as MD (Cardiology)  SELF, REFERRED

## 2017-10-13 NOTE — NURSING NOTE
Chief Complaint   Patient presents with     New Patient     heart problem--57 year old female with history of ASD, PDA, A flutter/ Afib, hypertension, hyperlipidemia, and pulmonary stenosis s/p pulmonary valvotomy x 3 presenting for evaluation--needs EKG   Vitals were taken and medication were reconciled. EKG performed.    Erma Rees CMA    11:34 AM

## 2017-10-13 NOTE — PROGRESS NOTES
HISTORY OF PRESENT ILLNESS:  Ms. Kelley is a pleasant 57-year-old woman who has a past medical history significant for congenital pulmonary stenosis, atrial septal defect, patent ductus arteriosus.  She underwent her first surgery at 6 weeks of age secondary to a murmur and was found to have critical AS.  This was in 1962.  She had her surgery by Dr. Ross and Dr. Concepcion.  She developed recurrent severe stenosis and had surgery again in 1964 and then again in 1968.  In 1968 it was through a lateral thoracotomy she had patent ductus arteriosus discovered and closed.  Finally, in 1983 she again underwent redo pulmonary valvulotomy and closure of atrial septal defect.  She was followed by Dr. Fowler as a child and started following with [facility] around 2011, where she was under the care of Dr. Larose.      Ms. Kelley has known severe pulmonary valve insufficiency.  However, her right ventricular size and function have been normal.  She was doing well without any concerning symptoms or issues up until 2016 when she had the first episode of atrial fibrillation/flutter.  She was seen by EP at [facility], Dr. Perez, and she was started on anticoagulation.  He put a BodyGuardian on her for remote monitoring, and this showed just brief paroxysmal atrial tachycardia.  Because of her atrial arrhythmias, the decision had been made to replace her pulmonary valve because of the severe right atrial enlargement, and she was scheduled to have this done apparently on 01/07/2017, but when she went for followup she saw Dr. Gill, and they made the decision as she has had no recurrent events since her cardioversion in 07/2016 that they could continue with watchful waiting, so she did not have her valve replaced.  She went on to have another atrial tachycardic event in 02/2017 but has had nothing further since that time.  She reports that she has not been as active as she used to be.  She does walk and does yoga and does  not feel that she is limited in what she does.  Her last cardiopulmonary stress test, however, was at [facility] in 02/2014, and she went 6.7 minutes with a functional aerobic capacity of 72.8%.  Her RER was 1.15, peak VO2 was 73% of predicted at 20.1 mL/kg/minute.  She had normal blood pressure and heart rate response to exercise.      Ms. Kelley denies tobacco use.  She does not have excess caffeine.  She works as a  for the [employer].  She is .  She has 2 children, ages 19 and 16.  She does have a family history of early coronary artery disease with her dad undergoing a CABG in his early 60s.  He was not a smoker and was not diabetic.  She also has a sister, interestingly, with mitral valve prolapse actually needing replacement or repair of her valve.  She denies chest pain or shortness of breath.  She denies palpitations or racing heart.  As part of her evaluation today, she did undergo a cardiac MRI, the results of this are not available, but I have looked at the images personally.  Her right ventricle does not appear to be enlarged.  She does have evidence of pulmonary insufficiency as outlined.  Her lab work including cholesterol was excellent.  TSH was normal, hemoglobin normal, as was her kidney function.  EKG showed sinus rhythm at 69 beats per minute with premature atrial contractions only.      IMPRESSION, REPORT, PLAN:   1.  Congenital pulmonary valve stenosis, status post pulmonary valvuloplasty in 1962, 1964 and 1983 through a median sternotomy, now with severe pulmonary insufficiency with normal right ventricular size and function.   2.  Atrial arrhythmias, initial event in 08/2016 with A-flutter/fib and subsequent event in 02/2016 with severe right atrial enlargement, on Xarelto and labetalol without known recurrence.   3.  History of PDA, status post ligation in 1968 through a [facility]acotomy at the [facility].   4.  Atrial septal defect, status post closure in  1983 at the [facility].   5.  Hypertension, well controlled.      DISCUSSION:  It was a pleasure being involved in the care of Mrs. Kelley.  As outlined, I have reviewed her history and symptoms and imaging in detail.  We discussed that with her pulmonary insufficiency, the data that we have in terms of replacing the valve is actually not meant for patients with a history of pulmonary stenosis and valvuloplasties, but rather tetralogy of Fallot, and it is hard to really extrapolate this information in this setting.  There was evidence that she had some impairment when she had her cardiopulmonary stress test in 2014 in which she went 70% of predicted.  We also discussed that her atrial arrhythmias are associated with the pulmonary insufficiency as well.  She has not had a recent cardiopulmonary stress test, and I think that would be helpful to guide us in the next steps.  We did discuss preliminarily pulmonary valve replacement and the types that we do and the timing of doing so.  We also discussed the Hoyt valve as one possibility.  We also had a brief discussion that if she did end up with an open thoracotomy and pulmonary valve replacement that theoretically a Jacqueline valve would be an option in the future.  She understood all of this, is very educated and was glad to have the information.  We also discussed her atrial arrhythmias, and she is under the care of Dr. Mitchell as well, and at this time we are watching her.  I felt that the likelihood of her having recurrent atrial arrhythmias is high, but at this time her burden has been quite low with only 2 events in the past year and a half and that continuing to watch her for a period is not unreasonable.  She does not need antibiotics before dental work.      Finally, we discussed that with all of the information that she has been given that she can think about it and as she is under excellent care at [facility], which is closer to her where she lives in  [place], but I would be happy to see her back.  We have preliminarily set up a followup visit with a CPX in 6 months, but she knows that we are always here if she needs anything and we would be happy to see her back sooner if necessary.  All questions were answered.  It was a pleasure to see her.  Please do not hesitate to contact me with any questions or concerns.         JAY SAUCEDO MD             D: 10/13/2017 13:27   T: 10/13/2017 16:16   MT: ZANE      Name:     EDWIN RAPP   MRN:      [id number]        Account:      [id number]   :      1960           Service Date: 10/13/2017      Document: [id number]

## 2017-10-13 NOTE — NURSING NOTE
Chief Complaint   Patient presents with     New Patient     heart problem--57 year old female with history of ASD, PDA, A flutter/ Afib, hypertension, hyperlipidemia, and pulmonary stenosis s/p pulmonary valvotomy x 3 presenting for evaluation--needs EKG

## 2017-10-13 NOTE — MR AVS SNAPSHOT
"              After Visit Summary   10/13/2017    Heydi Kelley    MRN: [id number]           Patient Information     Date Of Birth          1960        Visit Information        Provider Department      10/13/2017 11:30 AM Rajiv Mitchell MD [facility]        Today's Diagnoses     Paroxysmal atrial fibrillation (H)    -  1    Typical atrial flutter (H)        History of pulmonic valvulotomy        S/P PDA repair        Status post atrial septal defect repair          Care Instructions    You were seen today in the Adult Congenital and Cardiovascular Genetics Clinic at the [facility].    Cardiology Providers you saw during your visit:  Dr Olivia Mccollum and Dr Rajiv Mitchell    Diagnosis:  History of pulmonary valvotomy, A flutter    Results:  Dr Mccollum reviewed the results of your testing in clinic today    Recommendations:    1.  Continue to eat a heart healthy, low salt diet.  2.  Continue to get 20-30 minutes of aerobic activity, 4-5 days per week.  Examples of aerobic activity include walking, running, swimming, cycling, etc.  3.  Continue to observe good oral hygiene, with regular dental visits.  4.  We will schedule you for a cardiopulmonary stress test.  For this, nothing to eat or drink 3 hours prior.  No caffeine, alcohol or tobacco 12 hours prior.  Please take all of your medications as prescribed the day of your test.  If you would like this to be done at [facility], please let us know and we will fax the order there.        Vitals:    10/13/17 1150   BP: 132/84   BP Location: Right arm   Patient Position: Chair   Cuff Size: Adult Regular   Pulse: 76   SpO2: 99%   Weight: 73 kg (160 lb 15 oz)   Height: 1.676 m (5' 6\")       SBE prophylaxis:   Yes____  No__x__    Lifelong Bacterial Endocarditis Prophylaxis:  YES____  NO____    If YES is checked, follow the recommendations outlined below:  1. Take antibiotic(s) prior to interventional procedures or surgeries (dental, respiratory, urologic, " gastrointestinal, gynecologic), or instrumental examinations.   2. Observe good oral hygiene daily, as advised by your dentist. Get regular professional dental care.  3. Keep cuts clean.  4. Infections should be treated promptly.      Exercise restrictions:   Yes____  No__x__         If yes, list restrictions:  Must be allowed to rest if fatigued or SOB      Work restrictions:  Yes____  No__x__         If yes, list restrictions:    FASTING CHOLESTEROL was checked in the last 5 years YES_x__  NO___  2017  Continue to eat a heart healthy, low salt diet.         ____ Fasting lipid panel order today         ____ No changes in medications          ____ I recommend the following changes in your cholesterol medications.:          ____ Please follow up for cholesterol screening at your primary care physician        Follow-up:  Follow up with Dr Mccollum in 6 months with a cardiopulmonary stress test    For after hours urgent needs, call [phone] and ask to speak to the Adult Congenital Physician on call.  Mention Job Code 0401.    For emergencies call 131.    For any scheduling needs, please call Rosalio Sotomayor, Procedure , at [phone]  Thank you for your visit today!  If you have questions or concerns about today's visit, please call me.    Tru Peterson RN, BSN  Cardiology Care Coordinator  [facility]  [phone]    [address] [place] [address]  Mail Code [id number]  [place] [address]          Follow-ups after your visit        Follow-up notes from your care team     Return in about 6 months (around 4/13/2018) for ACHD Follow up with Veda enriquez Pulmonary Valve Stenosis.      Your next 10 appointments already scheduled     Apr 13, 2018 10:30 AM CDT   Card Cardpul Stress Tst Adult with [facility]   [facility] ([facility])    [address]  [facility] [address]               Apr 13, 2018  1:00 PM CDT   (Arrive by 12:45 PM)   RETURN  "CONGENITAL HEART with Shamane Olivia Mccollum MD   [facility] ([facility])    [address]  3rd Floor  [facility] [address] [phone]              Who to contact     If you have questions or need follow up information about today's clinic visit or your schedule please contact [facility] directly at [phone].  Normal or non-critical lab and imaging results will be communicated to you by MyChart, letter or phone within 4 business days after the clinic has received the results. If you do not hear from us within 7 days, please contact the clinic through iDoc24hart or phone. If you have a critical or abnormal lab result, we will notify you by phone as soon as possible.  Submit refill requests through SquaredOut or call your pharmacy and they will forward the refill request to us. Please allow 3 business days for your refill to be completed.          Additional Information About Your Visit        [email or web address] Information     SquaredOut gives you secure access to your electronic health record. If you see a primary care provider, you can also send messages to your care team and make appointments. If you have questions, please call your primary care clinic.  If you do not have a primary care provider, please call [phone] and they will assist you.        Care EveryWhere ID     This is your Care EveryWhere ID. This could be used by other organizations to access your [facility] medical records  [id number]        Your Vitals Were     Pulse Height Pulse Oximetry BMI (Body Mass Index)          76 1.676 m (5' 6\") 99% 25.97 kg/m2         Blood Pressure from Last 3 Encounters:   10/13/17 132/84   10/13/17 132/84    Weight from Last 3 Encounters:   10/13/17 73 kg (160 lb 15 oz)   10/13/17 73 kg (160 lb 14.4 oz)              We Performed the Following     EKG 12-lead, tracing only (Future)        Primary Care Provider Office Phone # Fax #    Nora Elias [phone] " [phone]       [place] [address] [facility] [address]        Equal Access to Services     GIL BALL : Hadii aad ku hadbartreginaldo Alexa, wathuda luqadaha, qaybta kaalmada noemi, marsha leein hayaajoe augustinsalma siddiqi laJonatiffany cho. So [facility] [phone].    ATENCIÓN: Si habla español, tiene a burgos disposición servicios gratuitos de asistencia lingüística. Llame al [phone].    We comply with applicable federal civil rights laws and [place] laws. We do not discriminate on the basis of race, color, national origin, age, disability, sex, sexual orientation, or gender identity.            Thank you!     Thank you for choosing [facility]  for your care. Our goal is always to provide you with excellent care. Hearing back from our patients is one way we can continue to improve our services. Please take a few minutes to complete the written survey that you may receive in the mail after your visit with us. Thank you!             Your Updated Medication List - Protect others around you: Learn how to safely use, store and throw away your medicines at www.disposemymeds.org.          This list is accurate as of: 10/13/17 11:59 PM.  Always use your most recent med list.                   Brand Name Dispense Instructions for use Diagnosis    ferrous sulfate 325 (65 FE) MG tablet    IRON     Take 325 mg by mouth every other day        labetalol 200 MG tablet    NORMODYNE     Take 300 mg by mouth 2 times daily        XARELTO PO      Take 20 mg by mouth daily (with dinner)

## 2017-10-13 NOTE — PROGRESS NOTES
Electrophysiology Clinic Note  HPI:   Ms. Kelley is a 57 year old female who has a past medical history significant for congenital pulmonary stenosis ASD and PDA s/p multiple pulmonary valvulotomy (1962, 1964, 1968, and 1983) with closure of PDA 1968 and closure on ASD 1983,now with severe pulmonary valve insufficiency, HTN, HLD, scoliosis, and PAF/AFL (CHADSVASC 2 on Xarelto) s/p DCCVs last 2017. [facility] and AF/AFL.      She has now known severe pulmonary valve insufficiency. Her RV size and function and remained normal. She is without activity limitations. She began to develop AF/AFL in 2016. She was seen by EP at [facility] who started her on Xarelto. She then underwent cardiac event monitoring which showed brief paroxysms of AT. She then had recurrences of AF/AFL and had DCCV in 7/2016 and 2/2017. Given her atrial arrhythmias and severe RA enlargement, decision was made to replace pulmonary valve. This was scheduled to be done in 1/2017 at [facility], but when she followed up in just prior to valve surgery decision was made to cancel surgery considering no recurrent events since her DCCV in 7/2016. She then had another episode of AF/AFL requiring DCCV in 2/2017. She reports symptoms of palpitations and decreased stamina when in AF/AFL. Of note, she did present to OSH ER once with symptoms of palpitation and was found to be in sinus; however, the other times she was in AF/AFL. Review of 12 lead ECGs show she has had both AF (7/2016) and AFL (2/2017). Her last cardiopulmonary stress test, however, was at [facility] in 02/2014, and she went 6.7 minutes with a functional aerobic capacity of 72.8%.  Her RER was 1.15, peak VO2 was 73% of predicted at 20.1 mL/kg/minute.  She had normal blood pressure and heart rate response to exercise. She has had longstanding HTN which has been under adequate control with labetalol. CMRI today shows moderate to severe  "pulmonic insufficiency due to restricted closure of the right pulmoniccusp. There is branch stenosis of the proximal left pulmonary artery (1.0 x 0.8 cm). Borderline reduced LV systolic function (LVEF 55%) and normal RV systolic function (RVEF 75%). She reports feeling well today. She denies any chest pain/pressures, dizziness, lightheadedness, shortness of breath, leg/ankle swelling, PND, orthopnea, or syncopal symptoms. Presenting 12 lead ECG shows SR with PACs Vent Rate 59 bpm,  ms, QRS 76 ms, QTc 417 ms. Current cardiac medications include: Xarelto and Labetalol.        PAST MEDICAL HISTORY:  Past Medical History:   Diagnosis Date     Atrial flutter (H)      Hyperlipidemia      Hypertension      Pulmonary artery stenosis      S/P atrial septal defect closure      S/P PDA repair      S/P pulmonary valvulotomy        CURRENT MEDICATIONS:  Current Outpatient Prescriptions   Medication Sig Dispense Refill     ferrous sulfate (IRON) 325 (65 FE) MG tablet Take 325 mg by mouth every other day       labetalol (NORMODYNE) 200 MG tablet Take 300 mg by mouth 2 times daily       Rivaroxaban (XARELTO PO) Take 20 mg by mouth daily (with dinner)         PAST SURGICAL HISTORY:  s/p multiple pulmonary valvulotomy (1962, 1964, 1968, and 1983) with closure of PDA 1968 and closure on ASD 1983  ALLERGIES:   Not on File    FAMILY HISTORY:  Early CAD    SOCIAL HISTORY:  Social History   Substance Use Topics     Smoking status: Never Smoker     Smokeless tobacco: Never Used     Alcohol use Not on file       ROS:   A comprehensive 10 point review of systems negative other than as mentioned in HPI.  Exam:  Ht 1.676 m (5' 6\")  Wt 73 kg (160 lb 14.4 oz)  BMI 25.97 kg/m2  GENERAL APPEARANCE: healthy, alert and no distress  HEENT: no icterus, no xanthelasmas, normal pupil size and reaction, normal palate, mucosa moist, no central cyanosis  NECK: no adenopathy, no asymmetry, masses, or scars, thyroid normal to palpation and no bruits, " JVP not elevated  RESPIRATORY: lungs clear to auscultation - no rales, rhonchi or wheezes, no use of accessory muscles, no retractions, respirations are unlabored, normal respiratory rate  CARDIOVASCULAR: regular rhythm, normal S1 with physiologic split S2, no S3 or S4 and II/IV diastolic murmur.  ABDOMEN: soft, non tender, bowel sounds normal  EXTREMITIES: peripheral pulses normal, no edema, no bruits  NEURO: alert and oriented to person/place/time, normal speech, gait and affect  SKIN: no ecchymoses, no rashes    Labs:  Reviewed.     Testing/Procedures:  10/2017 CMRI:  Comparison CMR: none     1. The LV is normal in cavity size and wall thickness. The global systolic function is borderline reduced.  The LVEF is 55%. There are no regional wall motion abnormalities.     2. The RV is normal in cavity size. The global systolic function is normal. The RVEF is 75%.      3. Moderate right atrial enlargement. Normal left atrial size.     4. There is moderate to severe pulmonic insufficiency. The pulmonic valve is tricuspid. There is restricted  closure of the right cusp.     5. Late gadolinium enhancement imaging shows no MI, fibrosis or infiltrative disease.      MRA Pulmonary Artery     1. The main pulmonary artery and proximal right pulmonary artery are normal size. There is a severe focal  stenosis of the proximal left pulmonary artery (the lumen measures 1.0 x 0.8 cm).     CONCLUSIONS: Moderate to severe pulmonic insufficiency due to restricted closure of the right pulmonic  cusp. There is branch stenosis of the proximal left pulmonary artery (1.0 x 0.8 cm). Borderline reduced LV  systolic function (LVEF 55%) and normal RV systolic function (RVEF 75%).    Assessment and Plan:   Ms. Kelley is a 57 year old female who has a past medical history significant for congenital pulmonary stenosis ASD and PDA s/p multiple pulmonary valvulotomy (1962, 1964, 1968, and 1983) with closure of PDA 1968 and closure on ASD 1983,now  with severe pulmonary valve insufficiency, HTN, HLD, scoliosis, and PAF/AFL (CHADSVASC 2 on Xarelto) s/p DCCVs last 2017. [facility] and AF/AFL.  She has now known severe pulmonary valve insufficiency. Her RV size and function and remained normal. She began to develop AF/AFL in  requiring DCCV in 2016 and 2017. CMRI today shows moderate to severe pulmonic insufficiency due to restricted closure of the right pulmoniccusp. There is branch stenosis of the proximal left pulmonary artery (1.0 x 0.8 cm). Borderline reduced LV systolic function (LVEF 55%) and normal RV systolic function (RVEF 75%).     We discussed in detail with the patient management/treatment options for Catarina includin. Stroke Prophylaxis:  CHADSVASC=+HTN, +gender  2, corresponding to a 2.2% annual stroke / systemic emolism event rate. indicating need for long term oral anticoagulation.  She is appropriately on Xarelto. No bleeding issues.   2. Rate Control: Continue labetalol .   3. Rhythm Control: Cardioversion, Antiarrhythmics and/or ablation are options for rhythm control. She has had DCCV for AF 2016 and DCCV for AFL 2017. We discussed that is she developed frequent episodes of AF we would consider starting AAT. If she develops recurrent AFL, we would prefer ablation for management. At this time, we would not perform any further interventions given infrequency of her occurances.   4. Risk Factor Management: maintain tight BP control, and BEV evaluation as indicated.       She has also established care today with Dr. Mccollum regarding her pulmonary insufficiency who plans to perform CPX and follow up with patient in 6 months.   We will plan to see patient as needed.     The patient states understanding and is agreeable with plan.   This patient was seen and evaluated with Dr. Mitchell. The above note reflects our joint assessment and plan.   JOSUE Salinas  CNP  Pager: [phone]    EP STAFF NOTE  I have seen and examined the patient as part of a shared visit with Paulina Meehan, EP NP.  I agree with the note above. I reviewed today's vital signs and medications. I have reviewed and discussed with the advanced practice provider their physical examination, assessment, and plan   Briefly, previous ASD/PDA and mainly PS s/p multiple valvulotomies, now with severe PI and atrial arrhythmias, mostly one afib episode and maybe 2 AFL episodes, last in Jan-Feb 2017, symptomatic, well rate controled.  My key history/exam findings are: RRR.   The key management decisions made by me: potential candidate for PV replacement (surgical versus transcatheter), seeing Dr Mccollum for that consideration. We discussed AAT and ablation of recurrences, if ends up having surgical PV replacement, can consider MAZE with CTI cryo too, if transcatheter then would wait for recurrence to treat based on rhythm as above..    Rajiv Mitchell MD [facility]  Cardiology - Electrophysiology    CC  SELF, REFERRED

## 2017-10-13 NOTE — NURSING NOTE
Chief Complaint   Patient presents with     New Patient     heart problem--57 year old female with history of ASD, PDA, A flutter/ Afib, hypertension, hyperlipidemia, and pulmonary stenosis s/p pulmonary valvotomy x 3 presenting for evaluation--needs EKG     Vitals were taken and medications were reconciled.    Erma Rees CMA     11:52 AM

## 2017-10-14 ENCOUNTER — HEALTH MAINTENANCE LETTER (OUTPATIENT)
Age: 57
End: 2017-10-14

## 2017-10-16 LAB — INTERPRETATION ECG - MUSE: NORMAL

## 2017-11-03 ENCOUNTER — CARE COORDINATION (OUTPATIENT)
Dept: CARDIOLOGY | Facility: CLINIC | Age: 57
End: 2017-11-03

## 2017-11-03 DIAGNOSIS — Q25.6 PULMONARY ARTERY STENOSIS: Primary | ICD-10-CM

## 2017-11-03 NOTE — PROGRESS NOTES
Date: 11/3/2017    Time of Call: 4:54 PM     Diagnosis:  Pulmonary artery stenosis     [ TORB ] Ordering provider: Dr Olivia Mccollum  Order: CPX, Lung Perfusion Scan in 3 months with follow up     Order received by: Tru Peterson RN     Follow-up/additional notes: Dr Mccollum called patient with results of MRI and future recommendations.  Patient states that she understands information provided and will call with further questions or concerns.

## 2018-01-24 ENCOUNTER — PRE VISIT (OUTPATIENT)
Dept: CARDIOLOGY | Facility: CLINIC | Age: 58
End: 2018-01-24

## 2018-01-24 PROBLEM — I37.1 PULMONIC VALVE INSUFFICIENCY: Status: ACTIVE | Noted: 2018-01-24

## 2018-01-26 ENCOUNTER — HOSPITAL ENCOUNTER (OUTPATIENT)
Dept: NUCLEAR MEDICINE | Facility: CLINIC | Age: 58
Setting detail: NUCLEAR MEDICINE
End: 2018-01-26
Attending: INTERNAL MEDICINE
Payer: COMMERCIAL

## 2018-01-26 ENCOUNTER — OFFICE VISIT (OUTPATIENT)
Dept: CARDIOLOGY | Facility: CLINIC | Age: 58
End: 2018-01-26
Attending: INTERNAL MEDICINE
Payer: COMMERCIAL

## 2018-01-26 ENCOUNTER — ANCILLARY ORDERS (OUTPATIENT)
Dept: CARDIOLOGY | Facility: CLINIC | Age: 58
End: 2018-01-26
Payer: COMMERCIAL

## 2018-01-26 ENCOUNTER — HOSPITAL ENCOUNTER (OUTPATIENT)
Dept: CARDIOLOGY | Facility: CLINIC | Age: 58
Discharge: HOME | End: 2018-01-26
Attending: INTERNAL MEDICINE | Admitting: INTERNAL MEDICINE
Payer: COMMERCIAL

## 2018-01-26 VITALS
OXYGEN SATURATION: 97 % | DIASTOLIC BLOOD PRESSURE: 73 MMHG | HEIGHT: 66 IN | BODY MASS INDEX: 25.07 KG/M2 | WEIGHT: 156 LBS | SYSTOLIC BLOOD PRESSURE: 111 MMHG | HEART RATE: 76 BPM

## 2018-01-26 DIAGNOSIS — Q25.6 PULMONARY ARTERY STENOSIS: Primary | ICD-10-CM

## 2018-01-26 DIAGNOSIS — Z98.890 S/P PULMONARY VALVULOTOMY: ICD-10-CM

## 2018-01-26 DIAGNOSIS — Q25.6 PULMONARY ARTERY STENOSIS: ICD-10-CM

## 2018-01-26 PROCEDURE — G0463 HOSPITAL OUTPT CLINIC VISIT: HCPCS | Mod: 25,ZF

## 2018-01-26 PROCEDURE — 34300033 ZZH RX 343: Performed by: INTERNAL MEDICINE

## 2018-01-26 PROCEDURE — 94621 CARDIOPULM EXERCISE TESTING: CPT | Mod: 26 | Performed by: INTERNAL MEDICINE

## 2018-01-26 PROCEDURE — 99214 OFFICE O/P EST MOD 30 MIN: CPT | Performed by: INTERNAL MEDICINE

## 2018-01-26 PROCEDURE — A9540 TC99M MAA: HCPCS | Performed by: INTERNAL MEDICINE

## 2018-01-26 PROCEDURE — 94621 CARDIOPULM EXERCISE TESTING: CPT | Performed by: INTERNAL MEDICINE

## 2018-01-26 PROCEDURE — 78597 LUNG PERFUSION DIFFERENTIAL: CPT

## 2018-01-26 RX ADMIN — Medication 7.2 MILLICURIE: at 12:45

## 2018-01-26 ASSESSMENT — PAIN SCALES - GENERAL: PAINLEVEL: NO PAIN (0)

## 2018-01-26 NOTE — PATIENT INSTRUCTIONS
"You were seen today in the Adult Congenital and Cardiovascular Genetics Clinic at the [facility].    Cardiology Providers you saw during your visit:  Dr Olivia Mccollum    Diagnosis:   History of pulmonary valvotomy, A flutter    Results:  Dr Mccollum reviewed the results of your cardiopulmonary stress test and VQ scan with you in clinic today    Recommendations:    1.  Continue to eat a heart healthy, low salt diet.  2.  Continue to get 20-30 minutes of aerobic activity, 4-5 days per week.  Examples of aerobic activity include walking, running, swimming, cycling, etc.  3.  Continue to observe good oral hygiene, with regular dental visits.  4.  Dr Mccollum will review you case in conference to determine our plan of care.      Vitals:    01/26/18 1419   BP: 111/73   BP Location: Left arm   Patient Position: Chair   Cuff Size: Adult Regular   Pulse: 76   SpO2: 97%   Weight: 70.8 kg (156 lb)   Height: 1.676 m (5' 6\")       SBE prophylaxis:   Yes____  No__x__    Lifelong Bacterial Endocarditis Prophylaxis:  YES____  NO____    If YES is checked, follow the recommendations outlined below:  1. Take antibiotic(s) prior to interventional procedures or surgeries (dental, respiratory, urologic, gastrointestinal, gynecologic), or instrumental examinations.   2. Observe good oral hygiene daily, as advised by your dentist. Get regular professional dental care.  3. Keep cuts clean.  4. Infections should be treated promptly.      Exercise restrictions:   Yes____  No__x__         If yes, list restrictions:  Must be allowed to rest if fatigued or SOB      Work restrictions:  Yes____  No_x___         If yes, list restrictions:    FASTING CHOLESTEROL was checked in the last 5 years YES_x__  NO___  2017  Continue to eat a heart healthy, low salt diet.         ____ Fasting lipid panel order today         ____ No changes in medications          ____ I recommend the following changes in your cholesterol medications.:          ____ " Please follow up for cholesterol screening at your primary care physician      Follow-up:  Follow up to be determined.     For after hours urgent needs, call [phone] and ask to speak to the Adult Congenital Physician on call.  Mention Job Code 0401.    For emergencies call 521.    For any scheduling needs, please call Rosalio Sotomayor Procedure , at [phone]  Thank you for your visit today!  If you have questions or concerns about today's visit, please call me.    Tru Peterson RN, BSN  Cardiology Care Coordinator  [facility]  [phone]    [address] [place] [address]  Mail Code [id number]  [place] [address]

## 2018-01-26 NOTE — PROGRESS NOTES
"HPI:     Please see dictated note    PAST MEDICAL HISTORY:  Past Medical History:   Diagnosis Date     Atrial flutter (H)      Hyperlipidemia      Hypertension      S/P atrial septal defect closure      S/P PDA repair      S/P pulmonary valvulotomy        CURRENT MEDICATIONS:  Current Outpatient Prescriptions   Medication Sig Dispense Refill     ferrous sulfate (IRON) 325 (65 FE) MG tablet Take 325 mg by mouth every other day       labetalol (NORMODYNE) 200 MG tablet Take 300 mg by mouth 2 times daily       Rivaroxaban (XARELTO PO) Take 20 mg by mouth daily (with dinner)         PAST SURGICAL HISTORY:  No past surgical history on file.    ALLERGIES   Not on File    FAMILY HISTORY:  No family history on file.    SOCIAL HISTORY:  Social History     Social History     Marital status:      Spouse name: N/A     Number of children: N/A     Years of education: N/A     Social History Main Topics     Smoking status: Never Smoker     Smokeless tobacco: Never Used     Alcohol use None     Drug use: None     Sexual activity: Not Asked     Other Topics Concern     None     Social History Narrative       ROS:   Constitutional: No fever, chills, or sweats. No weight gain/loss   ENT: No visual disturbance, ear ache, epistaxis, sore throat  Allergies/Immunologic: Negative.   Respiratory: No cough, hemoptysia  Cardiovascular: As per HPI  GI: No nausea, vomiting, hematemesis, melena, or hematochezia  : No urinary frequency, dysuria, or hematuria  Integument: Negative  Psychiatric: Negative  Neuro: Negative  Endocrinology: Negative   Musculoskeletal: Negative    EXAM:  /73 (BP Location: Left arm, Patient Position: Chair, Cuff Size: Adult Regular)  Pulse 76  Ht 1.676 m (5' 6\")  Wt 70.8 kg (156 lb)  SpO2 97%  BMI 25.18 kg/m2  In general, the patient is a pleasant female in no apparent distress.      Labs:  LIPID RESULTS:  Lab Results   Component Value Date    CHOL 178 10/13/2017    HDL 71 10/13/2017    LDL 90 " 10/13/2017    TRIG 81 10/13/2017    NHDL 106 10/13/2017       LIVER ENZYME RESULTS:  Lab Results   Component Value Date    AST 24 10/13/2017    ALT 28 10/13/2017       CBC RESULTS:  Lab Results   Component Value Date    WBC 5.5 10/13/2017    RBC 4.02 10/13/2017    HGB 12.6 10/13/2017    HCT 38.4 10/13/2017    MCV 96 10/13/2017    MCH 31.3 10/13/2017    MCHC 32.8 10/13/2017    RDW 12.1 10/13/2017     10/13/2017       BMP RESULTS:  Lab Results   Component Value Date     10/13/2017    POTASSIUM 4.0 10/13/2017    CHLORIDE 102 10/13/2017    CO2 28 10/13/2017    ANIONGAP 6 10/13/2017    GLC 87 10/13/2017    BUN 11 10/13/2017    CR 0.88 10/13/2017    GFRESTIMATED 67 10/13/2017    GFRESTBLACK 81 10/13/2017    KEMAR 8.8 10/13/2017        A1C RESULTS:  No results found for: A1C    INR RESULTS:  Lab Results   Component Value Date    INR 1.1 02/09/2017     JOSUE Mccollum MD [facility]  Adult Congenital and Interventional Cardiology   [facility]    CC  Patient Care Team:  Nora Elias NP as PCP - General  Tru Peterson, RN as Nurse Coordinator (Cardiology)  Madhu Mccollum MD as MD (Cardiology)  SELF, REFERRED

## 2018-01-26 NOTE — MR AVS SNAPSHOT
"              After Visit Summary   1/26/2018    Heydi Kelley    MRN: [id number]           Patient Information     Date Of Birth          1960        Visit Information        Provider Department      1/26/2018 3:00 PM Madhu Mccollum MD [facility]        Care Instructions    You were seen today in the Adult Congenital and Cardiovascular Genetics Clinic at the [facility].    Cardiology Providers you saw during your visit:  Dr Olivia Mccollum    Diagnosis:   History of pulmonary valvotomy, A flutter    Results:  Dr Mccollum reviewed the results of your cardiopulmonary stress test and VQ scan with you in clinic today    Recommendations:    1.  Continue to eat a heart healthy, low salt diet.  2.  Continue to get 20-30 minutes of aerobic activity, 4-5 days per week.  Examples of aerobic activity include walking, running, swimming, cycling, etc.  3.  Continue to observe good oral hygiene, with regular dental visits.  4.  Dr Mccollum will review you case in conference to determine our plan of care.      Vitals:    01/26/18 1419   BP: 111/73   BP Location: Left arm   Patient Position: Chair   Cuff Size: Adult Regular   Pulse: 76   SpO2: 97%   Weight: 70.8 kg (156 lb)   Height: 1.676 m (5' 6\")       SBE prophylaxis:   Yes____  No__x__    Lifelong Bacterial Endocarditis Prophylaxis:  YES____  NO____    If YES is checked, follow the recommendations outlined below:  1. Take antibiotic(s) prior to interventional procedures or surgeries (dental, respiratory, urologic, gastrointestinal, gynecologic), or instrumental examinations.   2. Observe good oral hygiene daily, as advised by your dentist. Get regular professional dental care.  3. Keep cuts clean.  4. Infections should be treated promptly.      Exercise restrictions:   Yes____  No__x__         If yes, list restrictions:  Must be allowed to rest if fatigued or SOB      Work restrictions:  Yes____  No_x___         If yes, list restrictions:    FASTING " CHOLESTEROL was checked in the last 5 years YES_x__  NO___  2017  Continue to eat a heart healthy, low salt diet.         ____ Fasting lipid panel order today         ____ No changes in medications          ____ I recommend the following changes in your cholesterol medications.:          ____ Please follow up for cholesterol screening at your primary care physician      Follow-up:  Follow up to be determined.     For after hours urgent needs, call [phone] and ask to speak to the Adult Congenital Physician on call.  Mention Job Code 0401.    For emergencies call 911.    For any scheduling needs, please call [facility] Sotomayor Procedure , at [phone]  Thank you for your visit today!  If you have questions or concerns about today's visit, please call me.    Tru Peterson RN, BSN  Cardiology Care Coordinator  [facility]  [phone]    [address] [place] [address]  Mail Code [id number]  [place] [address]            Follow-ups after your visit        Future tests that were ordered for you today     Open Future Orders        Priority Expected Expires Ordered    NM Lung Quant Perfusion Routine  11/3/2018 11/3/2017            Who to contact     If you have questions or need follow up information about today's clinic visit or your schedule please contact [facility] directly at [phone].  Normal or non-critical lab and imaging results will be communicated to you by MyChart, letter or phone within 4 business days after the clinic has received the results. If you do not hear from us within 7 days, please contact the clinic through MyChart or phone. If you have a critical or abnormal lab result, we will notify you by phone as soon as possible.  Submit refill requests through [employer] or call your pharmacy and they will forward the refill request to us. Please allow 3 business days for your refill to be completed.          Additional Information About Your Visit        [employer]  "Information     [facility] gives you secure access to your electronic health record. If you see a primary care provider, you can also send messages to your care team and make appointments. If you have questions, please call your primary care clinic.  If you do not have a primary care provider, please call [phone] and they will assist you.        Care EveryWhere ID     This is your Care EveryWhere ID. This could be used by other organizations to access your [facility] medical records  [id number]        Your Vitals Were     Pulse Height Pulse Oximetry BMI (Body Mass Index)          76 1.676 m (5' 6\") 97% 25.18 kg/m2         Blood Pressure from Last 3 Encounters:   01/26/18 111/73   10/13/17 132/84   10/13/17 132/84    Weight from Last 3 Encounters:   01/26/18 70.8 kg (156 lb)   10/13/17 73 kg (160 lb 15 oz)   10/13/17 73 kg (160 lb 14.4 oz)              Today, you had the following     No orders found for display       Primary Care Provider Office Phone # Fax #    Virginia Curt, [place] [phone]       [address] [address]        Equal Access to Services     GIL BALL AH: Hadii ishaan vang hadasho Soomaali, waaxda luqadaha, qaybta kaalmada adeegyada, marsha cho. So [facility] [phone].    ATENCIÓN: Si habla español, tiene a burgos disposición servicios gratuitos de asistencia lingüística. Llame al [phone].    We comply with applicable federal civil rights laws and [place] laws. We do not discriminate on the basis of race, color, national origin, age, disability, sex, sexual orientation, or gender identity.            Thank you!     Thank you for choosing [facility]  for your care. Our goal is always to provide you with excellent care. Hearing back from our patients is one way we can continue to improve our services. Please take a few minutes to complete the written survey that you may receive in the mail after your visit with us. Thank " you!             Your Updated Medication List - Protect others around you: Learn how to safely use, store and throw away your medicines at www.disposemymeds.org.          This list is accurate as of 1/26/18  3:46 PM.  Always use your most recent med list.                   Brand Name Dispense Instructions for use Diagnosis    ferrous sulfate 325 (65 FE) MG tablet    IRON     Take 325 mg by mouth every other day        labetalol 200 MG tablet    NORMODYNE     Take 300 mg by mouth 2 times daily        XARELTO PO      Take 20 mg by mouth daily (with dinner)

## 2018-01-26 NOTE — LETTER
1/26/2018      RE: Heydi Kelley  [address] [facility] [address]       Dear Colleague,    Thank you for the opportunity to participate in the care of your patient, Heydi Kelley, at the [facility]. Please see a copy of my visit note below.    HPI:       PAST MEDICAL HISTORY:  Past Medical History:   Diagnosis Date     Atrial flutter (H)      Hyperlipidemia      Hypertension      S/P atrial septal defect closure      S/P PDA repair      S/P pulmonary valvulotomy        CURRENT MEDICATIONS:  Current Outpatient Prescriptions   Medication Sig Dispense Refill     ferrous sulfate (IRON) 325 (65 FE) MG tablet Take 325 mg by mouth every other day       labetalol (NORMODYNE) 200 MG tablet Take 300 mg by mouth 2 times daily       Rivaroxaban (XARELTO PO) Take 20 mg by mouth daily (with dinner)         PAST SURGICAL HISTORY:  No past surgical history on file.    ALLERGIES   Not on File    FAMILY HISTORY:  No family history on file.    SOCIAL HISTORY:  Social History     Social History     Marital status:      Spouse name: N/A     Number of children: N/A     Years of education: N/A     Social History Main Topics     Smoking status: Never Smoker     Smokeless tobacco: Never Used     Alcohol use None     Drug use: None     Sexual activity: Not Asked     Other Topics Concern     None     Social History Narrative       ROS:   Constitutional: No fever, chills, or sweats. No weight gain/loss   ENT: No visual disturbance, ear ache, epistaxis, sore throat  Allergies/Immunologic: Negative.   Respiratory: No cough, hemoptysia  Cardiovascular: As per HPI  GI: No nausea, vomiting, hematemesis, melena, or hematochezia  : No urinary frequency, dysuria, or hematuria  Integument: Negative  Psychiatric: Negative  Neuro: Negative  Endocrinology: Negative   Musculoskeletal: Negative    EXAM:  /73 (BP Location: Left arm, Patient Position: Chair, Cuff Size: Adult  "Regular)  Pulse 76  Ht 1.676 m (5' 6\")  Wt 70.8 kg (156 lb)  SpO2 97%  BMI 25.18 kg/m2  In general, the patient is a pleasant female in no apparent distress.      Labs:  LIPID RESULTS:  Lab Results   Component Value Date    CHOL 178 10/13/2017    HDL 71 10/13/2017    LDL 90 10/13/2017    TRIG 81 10/13/2017    NHDL 106 10/13/2017       LIVER ENZYME RESULTS:  Lab Results   Component Value Date    AST 24 10/13/2017    ALT 28 10/13/2017       CBC RESULTS:  Lab Results   Component Value Date    WBC 5.5 10/13/2017    RBC 4.02 10/13/2017    HGB 12.6 10/13/2017    HCT 38.4 10/13/2017    MCV 96 10/13/2017    MCH 31.3 10/13/2017    MCHC 32.8 10/13/2017    RDW 12.1 10/13/2017     10/13/2017       BMP RESULTS:  Lab Results   Component Value Date     10/13/2017    POTASSIUM 4.0 10/13/2017    CHLORIDE 102 10/13/2017    CO2 28 10/13/2017    ANIONGAP 6 10/13/2017    GLC 87 10/13/2017    BUN 11 10/13/2017    CR 0.88 10/13/2017    GFRESTIMATED 67 10/13/2017    GFRESTBLACK 81 10/13/2017    KEMAR 8.8 10/13/2017        A1C RESULTS:  No results found for: A1C    INR RESULTS:  Lab Results   Component Value Date    INR 1.1 02/09/2017         Service Date: 01/26/2018      HISTORY OF PRESENT ILLNESS:  Ms. Kelley is a pleasant 57-year-old woman who is known to me.  I met her in 10/2017.  Please see that note for complete details.  She has a past medical history significant for congenital pulmonary stenosis, atrial septal defect, patent ductus arteriosus.  She underwent pulmonary valvuloplasty in 1962, 1964 and 1983 through a median sternotomy and has known severe pulmonary insufficiency but with normal right ventricular chamber size and function.  She also has a history of atrial arrhythmias, history of patent ductus arteriosus that was ligated in 1968 and then an atrial septal defect that was closed in 1983.      When I met Ms. Kelley, we had discussed her history and her testing and her symptoms and discussed proceeding with " a cardiopulmonary stress test, and we did a cardiac MRI and MRA.  The cardiac MRI and MRA were not complete at the time of her visit with me but subsequently were, and I called her with those results.  She was found to have severe focal stenosis of the left proximal pulmonary artery with the lumen measuring 1 x 0.8 cm with moderate to severe pulmonary insufficiency due to restrictive closure of the right pulmonic cusp, normal RV systolic function and LV systolic function.  Her right ventricular end-diastolic volume was just mildly elevated at 102.  She returns for followup.  With her followup today we did do an exercise study in which she went 62% of predicted.  Her VE/VCO2 slope was 27.2 with an RVR of 1.29.  She also underwent a nuclear medicine quantitative perfusion study that showed that she has 72% of her lung flow going to the right lung compared to 27% in the left.  She is a bit perplexed as to why [place] did not find any of this.  She is otherwise feeling well.  She has had no return of her atrial arrhythmias.      IMPRESSION, REPORT, PLAN:   1.  Congenital pulmonary valve stenosis status post pulmonary valvuloplasty in 1962, 1964 and 1983 through a median sternotomy, now with severe pulmonary insufficiency and normal right ventricular size and function.   2.  Atrial arrhythmias, initial event 08/2016 with atrial fibrillation/flutter and subsequent event 02/2016, now on Xarelto and labetalol without recurrence.   3.  History of PDA, status post ligation in 1968 through a lateral thoracotomy at the [facility].   4.  Atrial septal defect, status post closure in 1983 at the [facility].   5.  Hypertension, well controlled.   6.  Left pulmonary artery stenosis with this lung perfusion study 01/26/2018 showing 72%, going to the right lung compared to 27% to the left lung.      DISCUSSION:  It was a pleasure to see Ms. Kelley in followup.  Today I reviewed the results of her testing with  her in detail including her cardiopulmonary stress test and her MRA and her lung perfusion scan.  We discussed that we would recommend that something be done in this regard, and usually it is pulmonary stenting, and then I would like to discuss her at our Adult Congenital Conference.  We also discussed the possibility of potentially an Hoyt valve that could be done at the same time.  She understands and is in agreement with this plan.  We will call once we have further information.  All questions were answered.  It was a pleasure to see her.  Please do not hesitate to contact me with any questions or concerns.      JOSUE Mccollum MD [facility]  Adult Congenital and Interventional Cardiology   [facility]      CC  Patient Care Team:  Nora Elias NP as PCP - General  Tru Peterson, RN as Nurse Coordinator (Cardiology)  Madhu Mccollum MD as MD (Cardiology)  SELF, REFERRED

## 2018-01-26 NOTE — NURSING NOTE
Chief Complaint   Patient presents with     Follow Up For     heart problem--57 year old female with history of ASD s/p closure, PDA s/p ligation, A flutter/ Afib, hypertension, hyperlipidemia, and pulmonary stenosis s/p pulmonary valvotomy x 3 presenting for follow up     Vitals were taken and medications were reconciled.   Jennifer Alvarez MA    2:22 PM

## 2018-01-26 NOTE — NURSING NOTE
Med Reconcile: Reviewed and verified all current medications with the patient. The updated medication list was printed and given to the patient.    Return Appointment: Follow up to be determined. Patient given instructions regarding scheduling next clinic visit. Patient demonstrated understanding of this information and agreed to call with further questions or concerns.    Patient stated she understood all health information given and agreed to call with further questions or concerns.    Tru Peterson RN  RN Care Coordinator  [facility]  [phone]

## 2018-03-09 ENCOUNTER — CARE COORDINATION (OUTPATIENT)
Dept: CARDIOLOGY | Facility: CLINIC | Age: 58
End: 2018-03-09

## 2018-03-09 DIAGNOSIS — I37.1: Primary | ICD-10-CM

## 2018-03-09 DIAGNOSIS — Q25.6 STENOSIS OF LEFT PULMONARY ARTERY: ICD-10-CM

## 2018-03-12 PROBLEM — Q25.6 STENOSIS OF LEFT PULMONARY ARTERY: Status: ACTIVE | Noted: 2018-03-12

## 2018-03-12 RX ORDER — ASPIRIN 81 MG/1
81 TABLET ORAL ONCE
Status: CANCELLED | OUTPATIENT
Start: 2018-03-12 | End: 2018-03-12

## 2018-03-12 RX ORDER — SODIUM CHLORIDE 9 MG/ML
INJECTION, SOLUTION INTRAVENOUS CONTINUOUS
Status: CANCELLED | OUTPATIENT
Start: 2018-03-12

## 2018-03-12 RX ORDER — LIDOCAINE 40 MG/G
CREAM TOPICAL
Status: CANCELLED | OUTPATIENT
Start: 2018-03-12

## 2018-03-12 RX ORDER — ASPIRIN 81 MG/1
81 TABLET ORAL DAILY
Status: CANCELLED | OUTPATIENT
Start: 2018-03-12

## 2018-03-15 ENCOUNTER — ANESTHESIA EVENT (OUTPATIENT)
Dept: SURGERY | Facility: CLINIC | Age: 58
End: 2018-03-15
Payer: COMMERCIAL

## 2018-03-20 ENCOUNTER — CARE COORDINATION (OUTPATIENT)
Dept: CARDIOLOGY | Facility: CLINIC | Age: 58
End: 2018-03-20

## 2018-03-20 DIAGNOSIS — Z01.818 PRE-OP EVALUATION: Primary | ICD-10-CM

## 2018-03-20 NOTE — PROGRESS NOTES
Date: 3/20/2018    Time of Call: 2:11 PM     Diagnosis:  Pulmonary valve insufficiency, pre op testing     [ TORB ] Ordering provider: Dr Guru Reyes  Order: Ultrasound of the neck and groin vessels to assess for occlusion prior to cath     Order received by: Tru Peterson RN     Follow-up/additional notes: Patient sent message with recommendations:    Brendan Soliz,    Dr Reyes informed me that you had a discussion with him regarding your procedure.  Please let me know if you have any other questions.  He did mention that we need to do some imaging of the arteries and veins in your neck and your lower extremities to evaluate for any occlusions prior.  I can fax these orders to be done at [place] so you don't have to drive here to have them done.  Or if you prefer to have them done here, let me know.  We like to have these done this week or early next week so we can get those records prior to your procedure.      Your instructions are as follows:    Will schedule outpatient left and right angiogram, left pulmonary artery stenting and Jacqueline valve placement on Friday April 6th. This will be done at the [facility], [address] [place] [address], [place] [address]. You are to check in on the 3rd floor, unit 3C at 5:30 AM . Please enter the main entrance of the hospital and walk past the coffee shop and gift shop,  the bank of elevators of elevators will be on your right.     Pre procedure instructions:  1. Please make arrangements to have a  as you will not be allowed to drive following the procedure.  2. Do not eat or drink after midnight the day of your procedure.  3. You may take your usual morning medications with a sip of water the morning of your procedure.  4.  Hold your Xarelto 3 days prior to the procedure.  5. Make arrangements to have someone stay with you the night after your procedure.  6. Please pack an overnight bag to spend the night, leave your valuables at home.  You  will likely discharge the next day.  7. Please have any elective dental work done prior to April 6th.  After April 6th, you will not be able to have elective dental work for 6 months.    If you need a hotel near our campus, please visit: [email or web address]    Some hotels in the area provide a discount if you have appointments here.    Attached is a map of the campus.  Your procedure will be done at the [facility] hospital- [address] [place] [address].  You may  park your car or park it in the patient/visitor ramp located on [facility].      Let me know if you have any other questions-  [phone]

## 2018-03-28 ENCOUNTER — TRANSFERRED RECORDS (OUTPATIENT)
Dept: HEALTH INFORMATION MANAGEMENT | Facility: CLINIC | Age: 58
End: 2018-03-28

## 2018-04-02 ENCOUNTER — CARE COORDINATION (OUTPATIENT)
Dept: CARDIOLOGY | Facility: CLINIC | Age: 58
End: 2018-04-02

## 2018-04-02 NOTE — PROGRESS NOTES
Heydi called into triage with multiple questions regarding her TAVR procedure Friday. Has a few questions about the procedure itself. She had talked to Tru last week about her [facility] paperwork and is following up on that. Has questions about doing cardiac rehab post procedure as well.   Kristy Garcia RN

## 2018-04-04 DIAGNOSIS — Q24.9 CONGENITAL HEART DISEASE: Primary | ICD-10-CM

## 2018-04-05 ENCOUNTER — NURSE TRIAGE (OUTPATIENT)
Dept: NURSING | Facility: CLINIC | Age: 58
End: 2018-04-05

## 2018-04-05 ASSESSMENT — ENCOUNTER SYMPTOMS: DYSRHYTHMIAS: 1

## 2018-04-05 NOTE — ANESTHESIA PREPROCEDURE EVALUATION
Anesthesia Evaluation     . Pt has had prior anesthetic. Type: General    No history of anesthetic complications          ROS/MED HX    ENT/Pulmonary:       Neurologic:  - neg neurologic ROS     Cardiovascular:     (+) Dyslipidemia, hypertension----. Taking blood thinners : . . . :. dysrhythmias a-flutter and a-fib, valvular problems/murmurs Pulmonic stenosis:. congenital heart disease Congenital pulmonic stenosis, atrial septal defect s/p closure, PDA s/p closure.:, Previous cardiac testing Echodate:2016results:date: results:ECG reviewed date:10/13/17 results:Sinus with PACs date: results:          METS/Exercise Tolerance:     Hematologic:  - neg hematologic  ROS       Musculoskeletal:  - neg musculoskeletal ROS       GI/Hepatic:  - neg GI/hepatic ROS       Renal/Genitourinary:  - ROS Renal section negative       Endo:  - neg endo ROS       Psychiatric:  - neg psychiatric ROS       Infectious Disease:  - neg infectious disease ROS       Malignancy:      - no malignancy   Other:    - neg other ROS                 Physical Exam      Airway   Mallampati: II  TM distance: >3 FB  Neck ROM: full    Dental   (+) partials    Cardiovascular   Rhythm and rate: regular and normal      Pulmonary    breath sounds clear to auscultation                    Anesthesia Plan      History & Physical Review  History and physical reviewed and following examination; no interval change.    ASA Status:  3 .    NPO Status:  > 8 hours    Plan for General and ETT with Intravenous induction. Maintenance will be TIVA.    PONV prophylaxis:  Ondansetron (or other 5HT-3) and Dexamethasone or Solumedrol  Additional equipment: 2nd IV and Arterial Line      Postoperative Care  Postoperative pain management:  IV analgesics.      Consents  Anesthetic plan, risks, benefits and alternatives discussed with:  Patient..                  Procedure: Procedure(s):  Anesthesia out of OR Cath Lab #5 Jacqueline Valve, Coronary Angiogram, Right Heartcath, Left  Pulmonary Artery Stent - Wound Class:     HPI: Heydi Kelley is a 57 year old female with history of congenital pulmonary stenosis, atrial septal defect, patent ductus arteriosus.  She underwent pulmonary valvuloplasty in 1962, 1964 and 1983 through a median sternotomy and has known severe pulmonary insufficiency but with normal right ventricular chamber size and function.  She also has a history of atrial arrhythmias, history of patent ductus arteriosus that was ligated in 1968 and then an atrial septal defect that was closed in 1983.  who is scheduled for the above procedure.     PMHx/PSHx:  Past Medical History:   Diagnosis Date     Atrial flutter (H)      Hyperlipidemia      Hypertension      S/P atrial septal defect closure      S/P PDA repair      S/P pulmonary valvulotomy        Past Surgical History:   Procedure Laterality Date     pulmonary valvulotomy         Social History   Substance Use Topics     Smoking status: Never Smoker     Smokeless tobacco: Never Used     Alcohol use No        Not on File    No prescriptions prior to admission.       Current Outpatient Prescriptions   Medication Sig Dispense Refill     vitamin B complex with vitamin C (VITAMIN  B COMPLEX) TABS tablet Take 1 tablet by mouth daily       ferrous sulfate (IRON) 325 (65 FE) MG tablet Take 325 mg by mouth every other day       labetalol (NORMODYNE) 200 MG tablet Take 300 mg by mouth 2 times daily       Rivaroxaban (XARELTO PO) Take 20 mg by mouth daily (with dinner)         Objective:   There were no vitals taken for this visit.  Lab Results   Component Value Date    WBC 5.5 10/13/2017    HGB 12.6 10/13/2017    HCT 38.4 10/13/2017     10/13/2017     10/13/2017    POTASSIUM 4.0 10/13/2017    CHLORIDE 102 10/13/2017    CO2 28 10/13/2017    BUN 11 10/13/2017    CR 0.88 10/13/2017    GLC 87 10/13/2017    AST 24 10/13/2017    ALT 28 10/13/2017    ALKPHOS 66 10/13/2017    BILITOTAL 0.6 10/13/2017    INR 1.1 02/09/2017        Previous Intubation:  None available    Assessment: Heydi Kelley is a 57 year old female with history of severe pulmonic stenosis who is scheduled for Procedure(s):  Anesthesia out of OR Cath Lab #5 Jacqueline Valve, Coronary Angiogram, Right Heartcath, Left Pulmonary Artery Stent - Wound Class: .     Plan: To be discussed with staff.   - ASA 4  - GETA with standard ASA monitors, IV induction, balanced anesthetic  - PIV x 2  - Preoperative arterial line  - Antibiotics per surgery  - PONV prophylaxis    Ulises Engel, DO  [id number]    I have seen and evaluated the patient myself and agree with the above assessment and plan.  I have explained the risks/benefits of anesthesia to the patient who understands and agrees to proceed.    Dejah Leone MD  Staff Anesthesiologist  Pager [phone]

## 2018-04-06 ENCOUNTER — APPOINTMENT (OUTPATIENT)
Dept: CARDIOLOGY | Facility: CLINIC | Age: 58
End: 2018-04-06
Attending: INTERNAL MEDICINE
Payer: COMMERCIAL

## 2018-04-06 ENCOUNTER — APPOINTMENT (OUTPATIENT)
Dept: GENERAL RADIOLOGY | Facility: CLINIC | Age: 58
End: 2018-04-06
Attending: INTERNAL MEDICINE
Payer: COMMERCIAL

## 2018-04-06 ENCOUNTER — HOSPITAL ENCOUNTER (INPATIENT)
Facility: CLINIC | Age: 58
LOS: 3 days | Discharge: HOME | End: 2018-04-09
Attending: INTERNAL MEDICINE | Admitting: INTERNAL MEDICINE
Payer: COMMERCIAL

## 2018-04-06 ENCOUNTER — ANESTHESIA (OUTPATIENT)
Dept: SURGERY | Facility: CLINIC | Age: 58
End: 2018-04-06
Payer: COMMERCIAL

## 2018-04-06 DIAGNOSIS — I37.1: ICD-10-CM

## 2018-04-06 DIAGNOSIS — I10 ESSENTIAL HYPERTENSION: ICD-10-CM

## 2018-04-06 DIAGNOSIS — J18.9 COMMUNITY ACQUIRED PNEUMONIA OF LEFT LOWER LOBE OF LUNG: ICD-10-CM

## 2018-04-06 DIAGNOSIS — Q25.6 STENOSIS OF LEFT PULMONARY ARTERY: ICD-10-CM

## 2018-04-06 DIAGNOSIS — Z95.2 S/P PULMONARY VALVE REPLACEMENT: Primary | ICD-10-CM

## 2018-04-06 LAB
ABO + RH BLD: NORMAL
ABO + RH BLD: NORMAL
ANION GAP SERPL CALCULATED.3IONS-SCNC: 6 MMOL/L (ref 3–14)
BASE EXCESS BLDA CALC-SCNC: 1.2 MMOL/L
BLD GP AB SCN SERPL QL: NORMAL
BLD PROD TYP BPU: NORMAL
BLD UNIT ID BPU: 0
BLD UNIT ID BPU: 0
BLOOD BANK CMNT PATIENT-IMP: NORMAL
BLOOD PRODUCT CODE: NORMAL
BLOOD PRODUCT CODE: NORMAL
BPU ID: NORMAL
BPU ID: NORMAL
BUN SERPL-MCNC: 12 MG/DL (ref 7–30)
CA-I BLD-SCNC: 4.5 MG/DL (ref 4.4–5.2)
CA-I BLD-SCNC: 4.6 MG/DL (ref 4.4–5.2)
CA-I BLD-SCNC: 4.7 MG/DL (ref 4.4–5.2)
CA-I BLD-SCNC: 4.8 MG/DL (ref 4.4–5.2)
CA-I BLD-SCNC: 4.8 MG/DL (ref 4.4–5.2)
CALCIUM SERPL-MCNC: 8.8 MG/DL (ref 8.5–10.1)
CHLORIDE SERPL-SCNC: 105 MMOL/L (ref 94–109)
CHOLEST SERPL-MCNC: 126 MG/DL
CO2 BLD-SCNC: 25 MMOL/L (ref 21–28)
CO2 BLD-SCNC: 26 MMOL/L (ref 21–28)
CO2 BLD-SCNC: 27 MMOL/L (ref 21–28)
CO2 BLD-SCNC: 27 MMOL/L (ref 21–28)
CO2 BLD-SCNC: 28 MMOL/L (ref 21–28)
CO2 BLD-SCNC: 29 MMOL/L (ref 21–28)
CO2 BLD-SCNC: 29 MMOL/L (ref 21–28)
CO2 BLD-SCNC: 30 MMOL/L (ref 21–28)
CO2 BLDCOV-SCNC: 26 MMOL/L (ref 21–28)
CO2 SERPL-SCNC: 27 MMOL/L (ref 20–32)
CREAT SERPL-MCNC: 0.94 MG/DL (ref 0.52–1.04)
ERYTHROCYTE [DISTWIDTH] IN BLOOD BY AUTOMATED COUNT: 12.7 % (ref 10–15)
ERYTHROCYTE [DISTWIDTH] IN BLOOD BY AUTOMATED COUNT: 13.5 % (ref 10–15)
GFR SERPL CREATININE-BSD FRML MDRD: 62 ML/MIN/1.7M2
GLUCOSE BLD-MCNC: 131 MG/DL (ref 70–99)
GLUCOSE BLD-MCNC: 133 MG/DL (ref 70–99)
GLUCOSE BLD-MCNC: 137 MG/DL (ref 70–99)
GLUCOSE BLD-MCNC: 142 MG/DL (ref 70–99)
GLUCOSE BLD-MCNC: 153 MG/DL (ref 70–99)
GLUCOSE BLD-MCNC: 164 MG/DL (ref 70–99)
GLUCOSE BLD-MCNC: 194 MG/DL (ref 70–99)
GLUCOSE BLDC GLUCOMTR-MCNC: 76 MG/DL (ref 70–99)
GLUCOSE BLDC GLUCOMTR-MCNC: 80 MG/DL (ref 70–99)
GLUCOSE BLDC GLUCOMTR-MCNC: 90 MG/DL (ref 70–99)
GLUCOSE BLDC GLUCOMTR-MCNC: 93 MG/DL (ref 70–99)
GLUCOSE BLDC GLUCOMTR-MCNC: 95 MG/DL (ref 70–99)
GLUCOSE SERPL-MCNC: 93 MG/DL (ref 70–99)
HCG SERPL QL: NEGATIVE
HCO3 BLD-SCNC: 26 MMOL/L (ref 21–28)
HCT VFR BLD AUTO: 35.7 % (ref 35–47)
HCT VFR BLD AUTO: 37 % (ref 35–47)
HCT VFR BLD CALC: 28 %PCV (ref 35–47)
HCT VFR BLD CALC: 28 %PCV (ref 35–47)
HCT VFR BLD CALC: 29 %PCV (ref 35–47)
HCT VFR BLD CALC: 30 %PCV (ref 35–47)
HCT VFR BLD CALC: 32 %PCV (ref 35–47)
HCT VFR BLD CALC: 32 %PCV (ref 35–47)
HCT VFR BLD CALC: 35 %PCV (ref 35–47)
HDLC SERPL-MCNC: 53 MG/DL
HGB BLD CALC-MCNC: 10.2 G/DL (ref 11.7–15.7)
HGB BLD CALC-MCNC: 10.9 G/DL (ref 11.7–15.7)
HGB BLD CALC-MCNC: 10.9 G/DL (ref 11.7–15.7)
HGB BLD CALC-MCNC: 11.9 G/DL (ref 11.7–15.7)
HGB BLD CALC-MCNC: 9.5 G/DL (ref 11.7–15.7)
HGB BLD CALC-MCNC: 9.5 G/DL (ref 11.7–15.7)
HGB BLD CALC-MCNC: 9.9 G/DL (ref 11.7–15.7)
HGB BLD-MCNC: 12.1 G/DL (ref 11.7–15.7)
HGB BLD-MCNC: 12.5 G/DL (ref 11.7–15.7)
INTERPRETATION ECG - MUSE: NORMAL
KCT BLD-ACNC: 148 SEC (ref 75–150)
KCT BLD-ACNC: 188 SEC (ref 75–150)
KCT BLD-ACNC: 188 SEC (ref 75–150)
KCT BLD-ACNC: 205 SEC (ref 75–150)
KCT BLD-ACNC: 205 SEC (ref 75–150)
KCT BLD-ACNC: 208 SEC (ref 75–150)
KCT BLD-ACNC: 213 SEC (ref 75–150)
KCT BLD-ACNC: 229 SEC (ref 75–150)
KCT BLD-ACNC: 261 SEC (ref 75–150)
LACTATE BLD-SCNC: 0.8 MMOL/L (ref 0.7–2.1)
LACTATE BLD-SCNC: 0.9 MMOL/L (ref 0.7–2.1)
LACTATE BLD-SCNC: 1 MMOL/L (ref 0.7–2.1)
LACTATE BLD-SCNC: 1.1 MMOL/L (ref 0.7–2.1)
LACTATE BLD-SCNC: 2 MMOL/L (ref 0.7–2.1)
LDLC SERPL CALC-MCNC: 51 MG/DL
MCH RBC QN AUTO: 31.8 PG (ref 26.5–33)
MCH RBC QN AUTO: 32.2 PG (ref 26.5–33)
MCHC RBC AUTO-ENTMCNC: 33.8 G/DL (ref 31.5–36.5)
MCHC RBC AUTO-ENTMCNC: 33.9 G/DL (ref 31.5–36.5)
MCV RBC AUTO: 94 FL (ref 78–100)
MCV RBC AUTO: 95 FL (ref 78–100)
NONHDLC SERPL-MCNC: 73 MG/DL
NUM BPU REQUESTED: 2
O2/TOTAL GAS SETTING VFR VENT: 50 %
OXYHGB MFR BLD: 97 % (ref 92–100)
PCO2 BLD: 33 MM HG (ref 35–45)
PCO2 BLD: 34 MM HG (ref 35–45)
PCO2 BLD: 35 MM HG (ref 35–45)
PCO2 BLD: 36 MM HG (ref 35–45)
PCO2 BLD: 37 MM HG (ref 35–45)
PCO2 BLD: 39 MM HG (ref 35–45)
PCO2 BLD: 40 MM HG (ref 35–45)
PCO2 BLD: 41 MM HG (ref 35–45)
PCO2 BLD: 43 MM HG (ref 35–45)
PCO2 BLD: 44 MM HG (ref 35–45)
PCO2 BLDV: 47 MM HG (ref 40–50)
PH BLD: 7.4 PH (ref 7.35–7.45)
PH BLD: 7.42 PH (ref 7.35–7.45)
PH BLD: 7.42 PH (ref 7.35–7.45)
PH BLD: 7.43 PH (ref 7.35–7.45)
PH BLD: 7.43 PH (ref 7.35–7.45)
PH BLD: 7.47 PH (ref 7.35–7.45)
PH BLD: 7.47 PH (ref 7.35–7.45)
PH BLD: 7.48 PH (ref 7.35–7.45)
PH BLD: 7.49 PH (ref 7.35–7.45)
PH BLD: 7.5 PH (ref 7.35–7.45)
PH BLD: 7.53 PH (ref 7.35–7.45)
PH BLD: 7.54 PH (ref 7.35–7.45)
PH BLDV: 7.36 PH (ref 7.32–7.43)
PLATELET # BLD AUTO: 104 10E9/L (ref 150–450)
PLATELET # BLD AUTO: 153 10E9/L (ref 150–450)
PO2 BLD: 119 MM HG (ref 80–105)
PO2 BLD: 124 MM HG (ref 80–105)
PO2 BLD: 152 MM HG (ref 80–105)
PO2 BLD: 386 MM HG (ref 80–105)
PO2 BLD: 390 MM HG (ref 80–105)
PO2 BLD: 431 MM HG (ref 80–105)
PO2 BLD: 443 MM HG (ref 80–105)
PO2 BLD: 523 MM HG (ref 80–105)
PO2 BLD: 550 MM HG (ref 80–105)
PO2 BLD: 563 MM HG (ref 80–105)
PO2 BLD: 91 MM HG (ref 80–105)
PO2 BLD: 93 MM HG (ref 80–105)
PO2 BLDV: 62 MM HG (ref 25–47)
POTASSIUM BLD-SCNC: 3.8 MMOL/L (ref 3.4–5.3)
POTASSIUM BLD-SCNC: 3.9 MMOL/L (ref 3.4–5.3)
POTASSIUM BLD-SCNC: 4.1 MMOL/L (ref 3.4–5.3)
POTASSIUM BLD-SCNC: 4.1 MMOL/L (ref 3.4–5.3)
POTASSIUM BLD-SCNC: 4.2 MMOL/L (ref 3.4–5.3)
POTASSIUM BLD-SCNC: 4.3 MMOL/L (ref 3.4–5.3)
POTASSIUM BLD-SCNC: 4.3 MMOL/L (ref 3.4–5.3)
POTASSIUM SERPL-SCNC: 4.2 MMOL/L (ref 3.4–5.3)
RBC # BLD AUTO: 3.8 10E12/L (ref 3.8–5.2)
RBC # BLD AUTO: 3.88 10E12/L (ref 3.8–5.2)
SAO2 % BLDA FROM PO2: 100 % (ref 92–100)
SAO2 % BLDA FROM PO2: 98 % (ref 92–100)
SAO2 % BLDA FROM PO2: 98 % (ref 92–100)
SAO2 % BLDA FROM PO2: 99 % (ref 92–100)
SAO2 % BLDA FROM PO2: 99 % (ref 92–100)
SAO2 % BLDV FROM PO2: 90 %
SODIUM BLD-SCNC: 136 MMOL/L (ref 133–144)
SODIUM BLD-SCNC: 136 MMOL/L (ref 133–144)
SODIUM BLD-SCNC: 137 MMOL/L (ref 133–144)
SODIUM BLD-SCNC: 137 MMOL/L (ref 133–144)
SODIUM BLD-SCNC: 138 MMOL/L (ref 133–144)
SODIUM SERPL-SCNC: 138 MMOL/L (ref 133–144)
SPECIMEN EXP DATE BLD: NORMAL
TRANSFUSION STATUS PATIENT QL: NORMAL
TRIGL SERPL-MCNC: 110 MG/DL
WBC # BLD AUTO: 4.4 10E9/L (ref 4–11)
WBC # BLD AUTO: 7.4 10E9/L (ref 4–11)

## 2018-04-06 PROCEDURE — 27210742 ZZH CATH CR1

## 2018-04-06 PROCEDURE — 86900 BLOOD TYPING SEROLOGIC ABO: CPT | Performed by: ANESTHESIOLOGY

## 2018-04-06 PROCEDURE — B2111ZZ FLUOROSCOPY OF MULTIPLE CORONARY ARTERIES USING LOW OSMOLAR CONTRAST: ICD-10-PCS | Performed by: INTERNAL MEDICINE

## 2018-04-06 PROCEDURE — 27211089 ZZH KIT ACIST INJECTOR CR3

## 2018-04-06 PROCEDURE — 94002 VENT MGMT INPAT INIT DAY: CPT

## 2018-04-06 PROCEDURE — 25000128 H RX IP 250 OP 636: Performed by: INTERNAL MEDICINE

## 2018-04-06 PROCEDURE — 37000008 ZZH ANESTHESIA TECHNICAL FEE, 1ST 30 MIN

## 2018-04-06 PROCEDURE — 36415 COLL VENOUS BLD VENIPUNCTURE: CPT | Performed by: ANESTHESIOLOGY

## 2018-04-06 PROCEDURE — 25000131 ZZH RX MED GY IP 250 OP 636 PS 637

## 2018-04-06 PROCEDURE — C1877 STENT, NON-COAT/COV W/O DEL: HCPCS

## 2018-04-06 PROCEDURE — 37000009 ZZH ANESTHESIA TECHNICAL FEE, EACH ADDTL 15 MIN

## 2018-04-06 PROCEDURE — 27810329

## 2018-04-06 PROCEDURE — 85027 COMPLETE CBC AUTOMATED: CPT | Performed by: NURSE PRACTITIONER

## 2018-04-06 PROCEDURE — 33477 IMPLANT TCAT PULM VLV PERQ: CPT

## 2018-04-06 PROCEDURE — 25000132 ZZH RX MED GY IP 250 OP 250 PS 637: Performed by: INTERNAL MEDICINE

## 2018-04-06 PROCEDURE — 93531 ZZHC COMB RT & LT HEART CATH FOR CONGENITAL ANOMALIES: CPT | Mod: XU

## 2018-04-06 PROCEDURE — 27210956 ZZH BALLOON TIP PRESSURE CR7

## 2018-04-06 PROCEDURE — 82803 BLOOD GASES ANY COMBINATION: CPT

## 2018-04-06 PROCEDURE — 93010 ELECTROCARDIOGRAM REPORT: CPT | Performed by: INTERNAL MEDICINE

## 2018-04-06 PROCEDURE — C1894 INTRO/SHEATH, NON-LASER: HCPCS

## 2018-04-06 PROCEDURE — 40000275 ZZH STATISTIC RCP TIME EA 10 MIN

## 2018-04-06 PROCEDURE — 85027 COMPLETE CBC AUTOMATED: CPT | Performed by: ANESTHESIOLOGY

## 2018-04-06 PROCEDURE — 20000004 ZZH R&B ICU UMMC

## 2018-04-06 PROCEDURE — P9016 RBC LEUKOCYTES REDUCED: HCPCS | Performed by: ANESTHESIOLOGY

## 2018-04-06 PROCEDURE — 84295 ASSAY OF SERUM SODIUM: CPT

## 2018-04-06 PROCEDURE — 83605 ASSAY OF LACTIC ACID: CPT

## 2018-04-06 PROCEDURE — 84132 ASSAY OF SERUM POTASSIUM: CPT

## 2018-04-06 PROCEDURE — 93568 NJX CAR CTH NSLC P-ART ANGRP: CPT

## 2018-04-06 PROCEDURE — C1769 GUIDE WIRE: HCPCS

## 2018-04-06 PROCEDURE — 85347 COAGULATION TIME ACTIVATED: CPT

## 2018-04-06 PROCEDURE — 40000014 ZZH STATISTIC ARTERIAL MONITORING DAILY

## 2018-04-06 PROCEDURE — C9399 UNCLASSIFIED DRUGS OR BIOLOG: HCPCS | Performed by: ANESTHESIOLOGY

## 2018-04-06 PROCEDURE — 86901 BLOOD TYPING SEROLOGIC RH(D): CPT | Performed by: ANESTHESIOLOGY

## 2018-04-06 PROCEDURE — 80061 LIPID PANEL: CPT | Performed by: INTERNAL MEDICINE

## 2018-04-06 PROCEDURE — 27210787 ZZH MANIFOLD CR2

## 2018-04-06 PROCEDURE — 93460 R&L HRT ART/VENTRICLE ANGIO: CPT

## 2018-04-06 PROCEDURE — 41000018 ZZH PER-PERFUSION 1ST 30 MIN

## 2018-04-06 PROCEDURE — 80048 BASIC METABOLIC PNL TOTAL CA: CPT | Performed by: ANESTHESIOLOGY

## 2018-04-06 PROCEDURE — 93005 ELECTROCARDIOGRAM TRACING: CPT

## 2018-04-06 PROCEDURE — 37237 OPEN/PERQ PLACE STENT EA ADD: CPT

## 2018-04-06 PROCEDURE — 93563 NJX CGEN CAR CTH SLCTV C ANG: CPT

## 2018-04-06 PROCEDURE — 40000170 ZZH STATISTIC PRE-PROCEDURE ASSESSMENT II

## 2018-04-06 PROCEDURE — 25000128 H RX IP 250 OP 636: Performed by: NURSE ANESTHETIST, CERTIFIED REGISTERED

## 2018-04-06 PROCEDURE — 82805 BLOOD GASES W/O2 SATURATION: CPT | Performed by: INTERNAL MEDICINE

## 2018-04-06 PROCEDURE — B31S1ZZ FLUOROSCOPY OF RIGHT PULMONARY ARTERY USING LOW OSMOLAR CONTRAST: ICD-10-PCS | Performed by: INTERNAL MEDICINE

## 2018-04-06 PROCEDURE — 02RH3JZ REPLACEMENT OF PULMONARY VALVE WITH SYNTHETIC SUBSTITUTE, PERCUTANEOUS APPROACH: ICD-10-PCS | Performed by: INTERNAL MEDICINE

## 2018-04-06 PROCEDURE — 25000128 H RX IP 250 OP 636: Performed by: STUDENT IN AN ORGANIZED HEALTH CARE EDUCATION/TRAINING PROGRAM

## 2018-04-06 PROCEDURE — 84703 CHORIONIC GONADOTROPIN ASSAY: CPT | Performed by: ANESTHESIOLOGY

## 2018-04-06 PROCEDURE — 25000565 ZZH ISOFLURANE, EA 15 MIN

## 2018-04-06 PROCEDURE — 71045 X-RAY EXAM CHEST 1 VIEW: CPT

## 2018-04-06 PROCEDURE — 82947 ASSAY GLUCOSE BLOOD QUANT: CPT

## 2018-04-06 PROCEDURE — 37236 OPEN/PERQ PLACE STENT 1ST: CPT

## 2018-04-06 PROCEDURE — 82330 ASSAY OF CALCIUM: CPT

## 2018-04-06 PROCEDURE — 86850 RBC ANTIBODY SCREEN: CPT | Performed by: ANESTHESIOLOGY

## 2018-04-06 PROCEDURE — 92990 REVISION OF PULMONARY VALVE: CPT

## 2018-04-06 PROCEDURE — 25000128 H RX IP 250 OP 636

## 2018-04-06 PROCEDURE — B31T1ZZ FLUOROSCOPY OF LEFT PULMONARY ARTERY USING LOW OSMOLAR CONTRAST: ICD-10-PCS | Performed by: INTERNAL MEDICINE

## 2018-04-06 PROCEDURE — 85014 HEMATOCRIT: CPT

## 2018-04-06 PROCEDURE — 4A023N8 MEASUREMENT OF CARDIAC SAMPLING AND PRESSURE, BILATERAL, PERCUTANEOUS APPROACH: ICD-10-PCS | Performed by: INTERNAL MEDICINE

## 2018-04-06 PROCEDURE — C1893 INTRO/SHEATH, FIXED,NON-PEEL: HCPCS

## 2018-04-06 PROCEDURE — C1725 CATH, TRANSLUMIN NON-LASER: HCPCS

## 2018-04-06 PROCEDURE — 86923 COMPATIBILITY TEST ELECTRIC: CPT | Performed by: ANESTHESIOLOGY

## 2018-04-06 PROCEDURE — 00000146 ZZHCL STATISTIC GLUCOSE BY METER IP

## 2018-04-06 PROCEDURE — 99222 1ST HOSP IP/OBS MODERATE 55: CPT | Performed by: NURSE PRACTITIONER

## 2018-04-06 PROCEDURE — 25000128 H RX IP 250 OP 636: Performed by: ANESTHESIOLOGY

## 2018-04-06 PROCEDURE — 25000125 ZZHC RX 250: Performed by: ANESTHESIOLOGY

## 2018-04-06 PROCEDURE — 27210946 ZZH KIT HC TOTES DISP CR8

## 2018-04-06 PROCEDURE — 027R3DZ DILATION OF LEFT PULMONARY ARTERY WITH INTRALUMINAL DEVICE, PERCUTANEOUS APPROACH: ICD-10-PCS | Performed by: INTERNAL MEDICINE

## 2018-04-06 PROCEDURE — 25000125 ZZHC RX 250: Performed by: NURSE ANESTHETIST, CERTIFIED REGISTERED

## 2018-04-06 RX ORDER — LABETALOL 300 MG/1
300 TABLET, FILM COATED ORAL EVERY 12 HOURS SCHEDULED
Status: DISCONTINUED | OUTPATIENT
Start: 2018-04-06 | End: 2018-04-07

## 2018-04-06 RX ORDER — HEPARIN SODIUM 1000 [USP'U]/ML
INJECTION, SOLUTION INTRAVENOUS; SUBCUTANEOUS PRN
Status: DISCONTINUED | OUTPATIENT
Start: 2018-04-06 | End: 2018-04-06

## 2018-04-06 RX ORDER — LABETALOL HYDROCHLORIDE 5 MG/ML
10 INJECTION, SOLUTION INTRAVENOUS ONCE
Status: COMPLETED | OUTPATIENT
Start: 2018-04-06 | End: 2018-04-06

## 2018-04-06 RX ORDER — NALOXONE HYDROCHLORIDE 0.4 MG/ML
.1-.4 INJECTION, SOLUTION INTRAMUSCULAR; INTRAVENOUS; SUBCUTANEOUS
Status: DISCONTINUED | OUTPATIENT
Start: 2018-04-06 | End: 2018-04-06

## 2018-04-06 RX ORDER — FENTANYL CITRATE 50 UG/ML
25-50 INJECTION, SOLUTION INTRAMUSCULAR; INTRAVENOUS
Status: CANCELLED | OUTPATIENT
Start: 2018-04-06

## 2018-04-06 RX ORDER — LIDOCAINE 40 MG/G
CREAM TOPICAL
Status: DISCONTINUED | OUTPATIENT
Start: 2018-04-06 | End: 2018-04-06 | Stop reason: HOSPADM

## 2018-04-06 RX ORDER — LABETALOL HYDROCHLORIDE 5 MG/ML
10 INJECTION, SOLUTION INTRAVENOUS
Status: CANCELLED | OUTPATIENT
Start: 2018-04-06

## 2018-04-06 RX ORDER — PROPOFOL 10 MG/ML
INJECTION, EMULSION INTRAVENOUS CONTINUOUS PRN
Status: DISCONTINUED | OUTPATIENT
Start: 2018-04-06 | End: 2018-04-06

## 2018-04-06 RX ORDER — PROTAMINE SULFATE 10 MG/ML
INJECTION, SOLUTION INTRAVENOUS PRN
Status: DISCONTINUED | OUTPATIENT
Start: 2018-04-06 | End: 2018-04-06

## 2018-04-06 RX ORDER — ONDANSETRON 2 MG/ML
INJECTION INTRAMUSCULAR; INTRAVENOUS PRN
Status: DISCONTINUED | OUTPATIENT
Start: 2018-04-06 | End: 2018-04-06

## 2018-04-06 RX ORDER — HYDROMORPHONE HYDROCHLORIDE 1 MG/ML
.3-.5 INJECTION, SOLUTION INTRAMUSCULAR; INTRAVENOUS; SUBCUTANEOUS EVERY 5 MIN PRN
Status: CANCELLED | OUTPATIENT
Start: 2018-04-06

## 2018-04-06 RX ORDER — IOPAMIDOL 755 MG/ML
350 INJECTION, SOLUTION INTRAVASCULAR ONCE
Status: COMPLETED | OUTPATIENT
Start: 2018-04-06 | End: 2018-04-06

## 2018-04-06 RX ORDER — IODIXANOL 320 MG/ML
50 INJECTION, SOLUTION INTRAVASCULAR ONCE
Status: COMPLETED | OUTPATIENT
Start: 2018-04-06 | End: 2018-04-06

## 2018-04-06 RX ORDER — SODIUM CHLORIDE, SODIUM LACTATE, POTASSIUM CHLORIDE, CALCIUM CHLORIDE 600; 310; 30; 20 MG/100ML; MG/100ML; MG/100ML; MG/100ML
INJECTION, SOLUTION INTRAVENOUS CONTINUOUS
Status: DISCONTINUED | OUTPATIENT
Start: 2018-04-06 | End: 2018-04-06 | Stop reason: HOSPADM

## 2018-04-06 RX ORDER — ATROPINE SULFATE 0.1 MG/ML
0.5 INJECTION INTRAVENOUS EVERY 5 MIN PRN
Status: ACTIVE | OUTPATIENT
Start: 2018-04-06 | End: 2018-04-07

## 2018-04-06 RX ORDER — FENTANYL CITRATE 50 UG/ML
50 INJECTION, SOLUTION INTRAMUSCULAR; INTRAVENOUS
Status: DISCONTINUED | OUTPATIENT
Start: 2018-04-06 | End: 2018-04-06

## 2018-04-06 RX ORDER — FENTANYL CITRATE 50 UG/ML
25-50 INJECTION, SOLUTION INTRAMUSCULAR; INTRAVENOUS
Status: DISCONTINUED | OUTPATIENT
Start: 2018-04-06 | End: 2018-04-08

## 2018-04-06 RX ORDER — ASPIRIN 81 MG/1
81 TABLET ORAL ONCE
Status: COMPLETED | OUTPATIENT
Start: 2018-04-06 | End: 2018-04-06

## 2018-04-06 RX ORDER — SODIUM CHLORIDE, SODIUM LACTATE, POTASSIUM CHLORIDE, CALCIUM CHLORIDE 600; 310; 30; 20 MG/100ML; MG/100ML; MG/100ML; MG/100ML
INJECTION, SOLUTION INTRAVENOUS CONTINUOUS PRN
Status: DISCONTINUED | OUTPATIENT
Start: 2018-04-06 | End: 2018-04-06

## 2018-04-06 RX ORDER — DEXTROSE MONOHYDRATE 25 G/50ML
25-50 INJECTION, SOLUTION INTRAVENOUS
Status: DISCONTINUED | OUTPATIENT
Start: 2018-04-06 | End: 2018-04-08

## 2018-04-06 RX ORDER — NALOXONE HYDROCHLORIDE 0.4 MG/ML
.2-.4 INJECTION, SOLUTION INTRAMUSCULAR; INTRAVENOUS; SUBCUTANEOUS
Status: DISCONTINUED | OUTPATIENT
Start: 2018-04-06 | End: 2018-04-06

## 2018-04-06 RX ORDER — SODIUM CHLORIDE, SODIUM LACTATE, POTASSIUM CHLORIDE, CALCIUM CHLORIDE 600; 310; 30; 20 MG/100ML; MG/100ML; MG/100ML; MG/100ML
INJECTION, SOLUTION INTRAVENOUS CONTINUOUS
Status: CANCELLED | OUTPATIENT
Start: 2018-04-06

## 2018-04-06 RX ORDER — PROPOFOL 10 MG/ML
10-20 INJECTION, EMULSION INTRAVENOUS EVERY 30 MIN PRN
Status: DISCONTINUED | OUTPATIENT
Start: 2018-04-06 | End: 2018-04-07

## 2018-04-06 RX ORDER — ASPIRIN 81 MG/1
81 TABLET ORAL DAILY
Status: CANCELLED | OUTPATIENT
Start: 2018-04-06

## 2018-04-06 RX ORDER — SODIUM CHLORIDE 9 MG/ML
INJECTION, SOLUTION INTRAVENOUS CONTINUOUS
Status: DISCONTINUED | OUTPATIENT
Start: 2018-04-06 | End: 2018-04-06 | Stop reason: HOSPADM

## 2018-04-06 RX ORDER — ASPIRIN 81 MG/1
81 TABLET ORAL DAILY
Status: DISCONTINUED | OUTPATIENT
Start: 2018-04-06 | End: 2018-04-06 | Stop reason: HOSPADM

## 2018-04-06 RX ORDER — FENTANYL CITRATE 50 UG/ML
25-50 INJECTION, SOLUTION INTRAMUSCULAR; INTRAVENOUS
Status: ACTIVE | OUTPATIENT
Start: 2018-04-06 | End: 2018-04-07

## 2018-04-06 RX ORDER — LIDOCAINE HYDROCHLORIDE 20 MG/ML
INJECTION, SOLUTION INFILTRATION; PERINEURAL PRN
Status: DISCONTINUED | OUTPATIENT
Start: 2018-04-06 | End: 2018-04-06

## 2018-04-06 RX ORDER — ONDANSETRON 2 MG/ML
4 INJECTION INTRAMUSCULAR; INTRAVENOUS EVERY 30 MIN PRN
Status: CANCELLED | OUTPATIENT
Start: 2018-04-06

## 2018-04-06 RX ORDER — CEFAZOLIN SODIUM 2 G/100ML
2 INJECTION, SOLUTION INTRAVENOUS
Status: COMPLETED | OUTPATIENT
Start: 2018-04-06 | End: 2018-04-06

## 2018-04-06 RX ORDER — FLUMAZENIL 0.1 MG/ML
0.2 INJECTION, SOLUTION INTRAVENOUS
Status: ACTIVE | OUTPATIENT
Start: 2018-04-06 | End: 2018-04-07

## 2018-04-06 RX ORDER — IOPAMIDOL 755 MG/ML
50 INJECTION, SOLUTION INTRAVASCULAR ONCE
Status: COMPLETED | OUTPATIENT
Start: 2018-04-06 | End: 2018-04-06

## 2018-04-06 RX ORDER — NALOXONE HYDROCHLORIDE 0.4 MG/ML
.1-.4 INJECTION, SOLUTION INTRAMUSCULAR; INTRAVENOUS; SUBCUTANEOUS
Status: DISCONTINUED | OUTPATIENT
Start: 2018-04-06 | End: 2018-04-08

## 2018-04-06 RX ORDER — NITROGLYCERIN 0.4 MG/1
0.4 TABLET SUBLINGUAL EVERY 5 MIN PRN
Status: DISCONTINUED | OUTPATIENT
Start: 2018-04-06 | End: 2018-04-08

## 2018-04-06 RX ORDER — FENTANYL CITRATE 50 UG/ML
INJECTION, SOLUTION INTRAMUSCULAR; INTRAVENOUS PRN
Status: DISCONTINUED | OUTPATIENT
Start: 2018-04-06 | End: 2018-04-06

## 2018-04-06 RX ORDER — NICOTINE POLACRILEX 4 MG
15-30 LOZENGE BUCCAL
Status: DISCONTINUED | OUTPATIENT
Start: 2018-04-06 | End: 2018-04-08

## 2018-04-06 RX ORDER — PROPOFOL 10 MG/ML
5-75 INJECTION, EMULSION INTRAVENOUS CONTINUOUS
Status: DISCONTINUED | OUTPATIENT
Start: 2018-04-06 | End: 2018-04-07

## 2018-04-06 RX ORDER — SODIUM CHLORIDE 9 MG/ML
INJECTION, SOLUTION INTRAVENOUS CONTINUOUS
Status: DISPENSED | OUTPATIENT
Start: 2018-04-06 | End: 2018-04-06

## 2018-04-06 RX ORDER — PROPOFOL 10 MG/ML
INJECTION, EMULSION INTRAVENOUS PRN
Status: DISCONTINUED | OUTPATIENT
Start: 2018-04-06 | End: 2018-04-06

## 2018-04-06 RX ORDER — ONDANSETRON 4 MG/1
4 TABLET, ORALLY DISINTEGRATING ORAL EVERY 30 MIN PRN
Status: CANCELLED | OUTPATIENT
Start: 2018-04-06

## 2018-04-06 RX ADMIN — HEPARIN SODIUM 5000 UNITS: 1000 INJECTION, SOLUTION INTRAVENOUS; SUBCUTANEOUS at 10:09

## 2018-04-06 RX ADMIN — PROPOFOL 70 MG: 10 INJECTION, EMULSION INTRAVENOUS at 15:21

## 2018-04-06 RX ADMIN — EPINEPHRINE 10 MCG: 1 INJECTION PARENTERAL at 09:48

## 2018-04-06 RX ADMIN — PROPOFOL 30 MG: 10 INJECTION, EMULSION INTRAVENOUS at 14:51

## 2018-04-06 RX ADMIN — HEPARIN SODIUM 2000 UNITS: 1000 INJECTION, SOLUTION INTRAVENOUS; SUBCUTANEOUS at 11:09

## 2018-04-06 RX ADMIN — HEPARIN SODIUM 2000 UNITS: 1000 INJECTION, SOLUTION INTRAVENOUS; SUBCUTANEOUS at 13:37

## 2018-04-06 RX ADMIN — SODIUM CHLORIDE: 9 INJECTION, SOLUTION INTRAVENOUS at 17:28

## 2018-04-06 RX ADMIN — CEFAZOLIN SODIUM 1 G: 2 INJECTION, SOLUTION INTRAVENOUS at 13:50

## 2018-04-06 RX ADMIN — MIDAZOLAM 1 MG: 1 INJECTION INTRAMUSCULAR; INTRAVENOUS at 08:56

## 2018-04-06 RX ADMIN — EPINEPHRINE 10 MCG: 1 INJECTION PARENTERAL at 09:38

## 2018-04-06 RX ADMIN — EPINEPHRINE 20 MCG: 1 INJECTION PARENTERAL at 16:06

## 2018-04-06 RX ADMIN — FENTANYL CITRATE 50 MCG: 50 INJECTION, SOLUTION INTRAMUSCULAR; INTRAVENOUS at 15:56

## 2018-04-06 RX ADMIN — IODIXANOL 50 ML: 320 INJECTION, SOLUTION INTRAVASCULAR at 15:15

## 2018-04-06 RX ADMIN — MIDAZOLAM 1 MG: 1 INJECTION INTRAMUSCULAR; INTRAVENOUS at 16:40

## 2018-04-06 RX ADMIN — EPINEPHRINE 5 MCG: 1 INJECTION PARENTERAL at 14:52

## 2018-04-06 RX ADMIN — CEFAZOLIN SODIUM 2 G: 2 INJECTION, SOLUTION INTRAVENOUS at 09:51

## 2018-04-06 RX ADMIN — EPINEPHRINE 10 MCG: 1 INJECTION PARENTERAL at 09:44

## 2018-04-06 RX ADMIN — ROCURONIUM BROMIDE 50 MG: 10 INJECTION INTRAVENOUS at 15:33

## 2018-04-06 RX ADMIN — EPINEPHRINE 0.03 MCG/KG/MIN: 1 INJECTION PARENTERAL at 09:32

## 2018-04-06 RX ADMIN — ROCURONIUM BROMIDE 10 MG: 10 INJECTION INTRAVENOUS at 11:12

## 2018-04-06 RX ADMIN — PROPOFOL 20 MG: 10 INJECTION, EMULSION INTRAVENOUS at 09:13

## 2018-04-06 RX ADMIN — PROPOFOL 70 MG: 10 INJECTION, EMULSION INTRAVENOUS at 09:10

## 2018-04-06 RX ADMIN — ROCURONIUM BROMIDE 10 MG: 10 INJECTION INTRAVENOUS at 14:17

## 2018-04-06 RX ADMIN — EPINEPHRINE 5 MCG: 1 INJECTION PARENTERAL at 10:56

## 2018-04-06 RX ADMIN — SUGAMMADEX 140 MG: 100 INJECTION, SOLUTION INTRAVENOUS at 14:45

## 2018-04-06 RX ADMIN — EPINEPHRINE 10 MCG: 1 INJECTION PARENTERAL at 09:54

## 2018-04-06 RX ADMIN — SUGAMMADEX 200 MG: 100 INJECTION, SOLUTION INTRAVENOUS at 16:50

## 2018-04-06 RX ADMIN — ROCURONIUM BROMIDE 10 MG: 10 INJECTION INTRAVENOUS at 13:30

## 2018-04-06 RX ADMIN — ROCURONIUM BROMIDE 10 MG: 10 INJECTION INTRAVENOUS at 09:52

## 2018-04-06 RX ADMIN — EPINEPHRINE 10 MCG: 1 INJECTION PARENTERAL at 09:19

## 2018-04-06 RX ADMIN — ASPIRIN 81 MG: 81 TABLET, COATED ORAL at 06:46

## 2018-04-06 RX ADMIN — PHENYLEPHRINE HYDROCHLORIDE 100 MCG: 10 INJECTION, SOLUTION INTRAMUSCULAR; INTRAVENOUS; SUBCUTANEOUS at 09:29

## 2018-04-06 RX ADMIN — EPINEPHRINE 10 MCG: 1 INJECTION PARENTERAL at 15:30

## 2018-04-06 RX ADMIN — PROPOFOL 50 MG: 10 INJECTION, EMULSION INTRAVENOUS at 16:01

## 2018-04-06 RX ADMIN — HEPARIN SODIUM 2000 UNITS: 1000 INJECTION, SOLUTION INTRAVENOUS; SUBCUTANEOUS at 12:36

## 2018-04-06 RX ADMIN — HEPARIN SODIUM 3000 UNITS: 1000 INJECTION, SOLUTION INTRAVENOUS; SUBCUTANEOUS at 10:38

## 2018-04-06 RX ADMIN — SODIUM CHLORIDE, POTASSIUM CHLORIDE, SODIUM LACTATE AND CALCIUM CHLORIDE: 600; 310; 30; 20 INJECTION, SOLUTION INTRAVENOUS at 13:54

## 2018-04-06 RX ADMIN — EPINEPHRINE 5 MCG: 1 INJECTION PARENTERAL at 14:56

## 2018-04-06 RX ADMIN — EPINEPHRINE 5 MCG: 1 INJECTION PARENTERAL at 10:50

## 2018-04-06 RX ADMIN — PROPOFOL 75 MCG/KG/MIN: 10 INJECTION, EMULSION INTRAVENOUS at 18:44

## 2018-04-06 RX ADMIN — FENTANYL CITRATE 50 MCG: 50 INJECTION, SOLUTION INTRAMUSCULAR; INTRAVENOUS at 16:45

## 2018-04-06 RX ADMIN — PROPOFOL 10 MG: 10 INJECTION, EMULSION INTRAVENOUS at 09:14

## 2018-04-06 RX ADMIN — ROCURONIUM BROMIDE 10 MG: 10 INJECTION INTRAVENOUS at 10:52

## 2018-04-06 RX ADMIN — LIDOCAINE HYDROCHLORIDE 60 MG: 20 INJECTION, SOLUTION INFILTRATION; PERINEURAL at 09:10

## 2018-04-06 RX ADMIN — SODIUM CHLORIDE, POTASSIUM CHLORIDE, SODIUM LACTATE AND CALCIUM CHLORIDE: 600; 310; 30; 20 INJECTION, SOLUTION INTRAVENOUS at 10:27

## 2018-04-06 RX ADMIN — PROTAMINE SULFATE 10 MG: 10 INJECTION, SOLUTION INTRAVENOUS at 14:50

## 2018-04-06 RX ADMIN — CEFAZOLIN SODIUM 1 G: 2 INJECTION, SOLUTION INTRAVENOUS at 11:46

## 2018-04-06 RX ADMIN — ROCURONIUM BROMIDE 10 MG: 10 INJECTION INTRAVENOUS at 12:49

## 2018-04-06 RX ADMIN — ROCURONIUM BROMIDE 50 MG: 10 INJECTION INTRAVENOUS at 09:10

## 2018-04-06 RX ADMIN — PHENYLEPHRINE HYDROCHLORIDE 100 MCG: 10 INJECTION, SOLUTION INTRAMUSCULAR; INTRAVENOUS; SUBCUTANEOUS at 09:25

## 2018-04-06 RX ADMIN — EPINEPHRINE 5 MCG: 1 INJECTION PARENTERAL at 14:53

## 2018-04-06 RX ADMIN — FENTANYL CITRATE 25 MCG: 50 INJECTION INTRAMUSCULAR; INTRAVENOUS at 22:35

## 2018-04-06 RX ADMIN — ROCURONIUM BROMIDE 10 MG: 10 INJECTION INTRAVENOUS at 11:50

## 2018-04-06 RX ADMIN — SODIUM CHLORIDE, POTASSIUM CHLORIDE, SODIUM LACTATE AND CALCIUM CHLORIDE: 600; 310; 30; 20 INJECTION, SOLUTION INTRAVENOUS at 08:56

## 2018-04-06 RX ADMIN — PROPOFOL 50 MCG/KG/MIN: 10 INJECTION, EMULSION INTRAVENOUS at 16:05

## 2018-04-06 RX ADMIN — EPINEPHRINE 10 MCG: 1 INJECTION PARENTERAL at 15:33

## 2018-04-06 RX ADMIN — ROCURONIUM BROMIDE 20 MG: 10 INJECTION INTRAVENOUS at 10:14

## 2018-04-06 RX ADMIN — EPINEPHRINE 5 MCG: 1 INJECTION PARENTERAL at 14:50

## 2018-04-06 RX ADMIN — Medication 10 MG: at 22:25

## 2018-04-06 RX ADMIN — EPINEPHRINE 5 MCG: 1 INJECTION PARENTERAL at 10:29

## 2018-04-06 RX ADMIN — IOPAMIDOL 350 ML: 755 INJECTION, SOLUTION INTRAVASCULAR at 15:15

## 2018-04-06 RX ADMIN — FENTANYL CITRATE 150 MCG: 50 INJECTION, SOLUTION INTRAMUSCULAR; INTRAVENOUS at 09:10

## 2018-04-06 RX ADMIN — PROPOFOL 75 MCG/KG/MIN: 10 INJECTION, EMULSION INTRAVENOUS at 18:40

## 2018-04-06 RX ADMIN — PROTAMINE SULFATE 20 MG: 10 INJECTION, SOLUTION INTRAVENOUS at 15:22

## 2018-04-06 RX ADMIN — IOPAMIDOL 50 ML: 755 INJECTION, SOLUTION INTRAVASCULAR at 16:00

## 2018-04-06 RX ADMIN — CEFAZOLIN SODIUM 1 G: 2 INJECTION, SOLUTION INTRAVENOUS at 15:50

## 2018-04-06 RX ADMIN — ONDANSETRON 4 MG: 2 INJECTION INTRAMUSCULAR; INTRAVENOUS at 14:45

## 2018-04-06 NOTE — ANESTHESIA POSTPROCEDURE EVALUATION
Patient: Heydi Kelley    Procedure(s):  Anesthesia out of OR Cath Lab #5 Jacqueline Valve, Coronary Angiogram, Right Heartcath, Left Pulmonary Artery Stent    Diagnosis:Pulmonary Left Artery Stenosis   Diagnosis Additional Information: No value filed.    Anesthesia Type:  General, ETT    Note:  Anesthesia Post Evaluation    Patient location during evaluation: ICU  Patient participation: Unable to evaluate secondary to administered sedation  Level of consciousness: obtunded/minimal responses  Pain management: adequate  Airway patency: patent  Cardiovascular status: acceptable  Respiratory status: acceptable  Hydration status: acceptable  PONV: none     Anesthetic complications: None          Last vitals:  Vitals:    04/06/18 0548 04/06/18 1700   BP: 143/85 105/75   Pulse: 81    Resp: 14    Temp: 36.8  C (98.2  F)    SpO2: 97% 100%         Electronically Signed By: Juan Ramon Vaughan MD  April 6, 2018  5:38 PM

## 2018-04-06 NOTE — ANESTHESIA CARE TRANSFER NOTE
Patient: Heydi Kelley    Procedure(s):  Anesthesia out of OR Cath Lab #5 Jacqueline Valve, Coronary Angiogram, Right Heartcath, Left Pulmonary Artery Stent    Diagnosis: Pulmonary Left Artery Stenosis   Diagnosis Additional Information: No value filed.    Anesthesia Type:   General, ETT     Note:  Airway :ETT  Patient transferred to:ICU  Comments: Pt transferred to 4A with gtts in situ, monitor in place with writerLANE CRNA, and Culling MDA. Manual ventilation with 10 of PEEP with 100% fio2. Upon arrival to ICU, pt connected to ventilator with settings recommended by Dr. Mccollum. Report given to RN. ICU Handoff: Call for PAUSE to initiate/utilize ICU HANDOFF, Identified Patient, Identified Responsible Provider, Reviewed the Pertinent Medical History, Discussed Surgical Course, Reviewed Intra-OP Anesthesia Management and Issues during Anesthesia, Set Expectations for Post Procedure Period and Allowed Opportunity for Questions and Acknowledgement of Understanding      Vitals: (Last set prior to Anesthesia Care Transfer)    CRNA VITALS  4/6/2018 1620 - 4/6/2018 1714      4/6/2018             Pulse: 80    ART BP: 145/79                Electronically Signed By: JOSUE Baxter CRNA  April 6, 2018  5:14 PM

## 2018-04-06 NOTE — OR NURSING
In PreOp, pt had extensive questions for MD. Dr. Amy Jean Baptiste spent approximately 40 min speaking with pt and  in PreOp.

## 2018-04-06 NOTE — ANESTHESIA PROCEDURE NOTES
Arterial Line Procedure Note  Staff:     Anesthesiologist:  GREGORY MCCARTNEY    Resident/CRNA:  KEKE ROMERO    Arterial line performed by resident/CRNA in presence of a teaching physician    Location: PACU  Procedure Start/Stop Times:      4/6/2018 8:20 AM     4/6/2018 8:35 AM    patient identified, IV checked, site marked, risks and benefits discussed, informed consent, monitors and equipment checked, pre-op evaluation and at physician/surgeon's request      Correct Patient: Yes      Correct Position: Yes      Correct Site: Yes      Correct Procedure: Yes      Correct Laterality:  Yes    Site Marked:  Yes  Line Placement:     Procedure:  Arterial Line    Insertion Site:  Radial    Insertion laterality:  Right    Skin Prep: Chloraprep      Patient Prep: patient draped, mask, sterile gloves, sterile gown, hat and hand hygiene      Local skin infiltration:  None    Ultrasound Guided?: No      Catheter size:  20 gauge, 12 cm    Cath secured with: suture      Dressing:  Tegaderm    Complications:  None obvious    Arterial waveform: Yes      IBP within 10% of NIBP: Yes

## 2018-04-06 NOTE — OR NURSING
Dr. Mccollum notified via text that pt needs H&P and abbreviation in consent order needs to be written out.

## 2018-04-06 NOTE — TELEPHONE ENCOUNTER
Surgery is for pulmonary valve replacement not the aortic valve.    Heydi is having pulmonary valve replacement tomorrow.  She's alarmed. Her [employer]Connect message states it is the aortic valve being replaced not the pulmonary valve.  I asked to have the on call for either Dr Mccollum or Dr Amy beard to call Heydi, [phone].  I also told Heydi I'd put a message in her chart.  I advised too that Heydi advocate for herself tomorrow and tell every nurse, doctor, provider that the procedure is for the pulmonary valve and not the aortic valve.  Davida SANDOVAL RN [facility]

## 2018-04-06 NOTE — OR NURSING
Pt unable to give adequate volume of urine for HCG test.  Dr. Leone notified, serum HCG ordered. Pt states she thought she is going through menopause but has spotting today. Provided stockinette underwear and peripad.

## 2018-04-06 NOTE — H&P
History and Physical     Heydi Kelley  MRN# [id number]        Date of Admission:  4/6/2018    HPI: Heydi Kelley is a 57 year old female with history of congenital pulmonary stenosis, s/p pulmonary valvuloplasty, (1962, 1964, 1983) severe pulmonary insufficiency, atrial septal defect s/p closure, (1983) patent ductus arteriosus s/p ligation,  (1968) and paroxysmal aflutter on Xarelto, who presents to [facility] today for elective abbi valve procedure.     Past Medical History:  Past Medical History:   Diagnosis Date     Atrial flutter (H)      Hyperlipidemia      Hypertension      S/P atrial septal defect closure      S/P PDA repair      S/P pulmonary valvulotomy        Past Surgical History:  Past Surgical History:   Procedure Laterality Date     pulmonary valvulotomy         Allergies:   No Known Allergies    Medications:    No current facility-administered medications on file prior to encounter.   Current Outpatient Prescriptions on File Prior to Encounter:  ferrous sulfate (IRON) 325 (65 FE) MG tablet Take 325 mg by mouth every other day   labetalol (NORMODYNE) 200 MG tablet Take 300 mg by mouth 2 times daily   Rivaroxaban (XARELTO PO) Take 20 mg by mouth daily (with dinner)       Social History:  Social History     Social History     Marital status:      Spouse name: N/A     Number of children: N/A     Years of education: N/A     Occupational History     Not on file.     Social History Main Topics     Smoking status: Never Smoker     Smokeless tobacco: Never Used     Alcohol use No     Drug use: No     Sexual activity: Not on file     Other Topics Concern     Not on file     Social History Narrative       Family History:  History reviewed. No pertinent family history.    ROS:  Unable to obtain d/t patient being intubated and sedated.    Exam:  Temp:  [98.2  F (36.8  C)] 98.2  F (36.8  C)  Pulse:  [81] 81  Resp:  [14] 14  BP: (143)/(85) 143/85  SpO2:  [97 %] 97 %    General: Intubated, sedated, not  following commands.  Eyes: sclera anicteric, EOMI  Resp: clear to auscultation bilaterally, no crackles or wheezes  Cardiovascular: regular rate and rhythm, II/VI systolic murmur best auscultated over the left upper sternal border, II/IV diastolic murmur over right upper sternal border.  GI: Soft, nontender, nondistended. +BS.  No HSM or masses, no rebound or guarding.  MSK: LE warm and well-perfused with no LE edema.  Skin: Warm and dry, no jaundice or rash  Neuro: Alert & oriented x0, sedated    Data:  CMP  Recent Labs  Lab 04/06/18  1427 04/06/18  1333 04/06/18  1140 04/06/18  1012 04/06/18  0646    136 136 138 138   POTASSIUM 4.1 4.2 4.3 4.1 4.2   CHLORIDE  --   --   --   --  105   CO2  --   --   --   --  27   ANIONGAP  --   --   --   --  6   * 164* 194* 137* 93   BUN  --   --   --   --  12   CR  --   --   --   --  0.94   GFRESTIMATED  --   --   --   --  62   GFRESTBLACK  --   --   --   --  75   KEMAR  --   --   --   --  8.8     CBC  Recent Labs  Lab 04/06/18  1427 04/06/18  1333 04/06/18  1140 04/06/18  1012 04/06/18  0646   WBC  --   --   --   --  4.4   RBC  --   --   --   --  3.88   HGB 9.5* 10.2* 10.9* 11.9 12.5   HCT  --   --   --   --  37.0   MCV  --   --   --   --  95   MCH  --   --   --   --  32.2   MCHC  --   --   --   --  33.8   RDW  --   --   --   --  12.7   PLT  --   --   --   --  153       No results found for: TROPI, TROPONIN, TROPR, TROPN      EKG: This morning, was SR, rates 62, with PAC's. Post procedure pending.      Assessment/ Plan: Heydi Kelley is a 57 year old female with history of congenital pulmonary stenosis, atrial septal defect, patent ductus arteriosus.  She underwent pulmonary valvuloplasty in 1962, 1964 and 1983 through a median sternotomy and has known severe pulmonary insufficiency but with normal right ventricular chamber size and function.  She also has a history of atrial arrhythmias, history of patent ductus arteriosus that was ligated in 1968 and then an atrial  septal defect that was closed in 1983.    # Pulmonary stenosis and pulmonary insufficiency, s/p abbi valve and stent to left pulmonary artery c/b small LPA dissection- patient was bronch'd intraoperatively.   - Keep patient intubated with high PEEP. Likely extubation tomorrow morning. Wean sedation as tolerated.  - Observe overnight  - Echo tomorrow prior to DC  - Hold Xarelto for now.  - Hold ASA for now. Will begin ASA at some point.  - Tele     # Hx of Paroxysmal Atrial flutter, on Xarelto  - Holding  Xarelto currently.   - Continue PTA Labetalol  - Tele     Dispo :Home  Pending extubation and absence of further signs of bleeding.    JOSUE Wharton, CNP  [facility]  Interventional Cardiology-[facility] Service  Pager [phone]     Physical Exam:  David Booker MD  Cardiology Fellow, PGY-4  Pager #[phone]        I have seen and examined the patient and agree with the finding and plan.       Chauncey Gupta MD  Cardiology-[facility]  [phone]

## 2018-04-06 NOTE — BRIEF OP NOTE
[facility], [place]    Brief Operative Note    Pre-operative diagnosis: Pulmonary Left Artery Stenosis and Severe Pulmonary Valve Regurgitation  Post-operative diagnosis  S/P LPA stent and RVOT stent placement X2 and Jacqueline Valve Placement    Procedure: Procedure(s):  Anesthesia out of OR Cath Lab #5 Jacqueline Valve, Coronary Angiogram, Right Heartcath, Left Pulmonary Artery Stent  Surgeon: Surgeon(s) and Role:     * GENERIC ANESTHESIA PROVIDER - Primary     * Madhu Mccollum MD - Assisting     * Jose Jeff MD - Assisting     * Santana Loo MD - Assisting  Anesthesia: General     Estimated blood loss: 100 cc    Specimens: None    Complications:  Mild post-procedure hemoptysis with no evidence of repeat imaging of pulmonary artery tear;  Possible small distal alveolar hemorrhage. Management on-going with planned bronchoscopy.  1 unit of blood given       Implants: 1) S/P Placement of LPA Stent with IntraStent LD Max 26 X 12 mm    2) S/P Placement of two 39 X 10 mm Palmaz XL Transhepatic Biliary stents in the RVOT     3) S/P Placement of 22 mm Medtronic Jacqueline Valve       ADDENDUM:  LPA angiogram confirmed a small dissection in a branch of the left lower lobe.  Hemoglobin stable and no more significant bleeding from the ET tube.  Plan is for her to remain intubated this evening with high PEEP and likely extubate in the AM.  Would hold Xarelto and ASA for 1 week. Z-stitch in the RFV and sheath in the LFV will need to be removed in AM.  Discussed with care team and family.  JOSUE Mccollum MD

## 2018-04-06 NOTE — IP AVS SNAPSHOT
MRN:[id number]                      After Visit Summary   4/6/2018    Heydi Kelley    MRN: [id number]           Thank you!     Thank you for choosing [facility] for your care. Our goal is always to provide you with excellent care. Hearing back from our patients is one way we can continue to improve our services. Please take a few minutes to complete the written survey that you may receive in the mail after you visit with us. Thank you!        Patient Information     Date Of Birth          1960        Designated Caregiver       Most Recent Value    Caregiver    Will someone help with your care after discharge? yes    Name of designated caregiver  (Martell)    Phone number of caregiver [phone]    [facility] [address]      About your hospital stay     You were admitted on:  April 6, 2018 You last received care in the:  [address]    You were discharged on:  April 9, 2018        Reason for your hospital stay       Pulmonary artery stent placement and pulmonic valve placement                  Who to Call     For medical emergencies, please call 911.  For non-urgent questions about your medical care, please call your primary care provider or clinic, [phone]  For questions related to your surgery, please call your surgery clinic        Attending Provider     Provider Specialty    Madhu Mccollum MD Cardiology    Chauncey Gupta MD Cardiology       Primary Care Provider Office Phone # Fax #    Nora Elias [phone]      After Care Instructions     Activity       Your activity upon discharge: activity as tolerated            Diet       Follow this diet upon discharge: Regular Diet Adult                  Follow-up Appointments     Adult [facility] Follow-up and recommended labs and tests       Follow up with Dr. Mccollum within 3 weeks  for hospital follow- up. No follow up labs or test are needed.    Appointments on [place]  "and/or [facility] (with [facility] or [facility] provider or service). Call [phone] if you haven't heard regarding these appointments within 7 days of discharge.                  Additional Services     CARDIAC REHAB REFERRAL       Patient may choose their preference of the site for Cardiac Rehab.   Pulmonary valve replacement                  Additional Information     If you use hormonal birth control (such as the pill, patch, ring or implants): You'll need a second form of birth control for 7 days (condoms, a diaphragm or contraceptive foam). While in the hospital, you received a medicine called Bridion. Your normal birth control will not work as well for a week after taking this medicine.          Pending Results     Date and Time Order Name Status Description    4/7/2018 1734 EKG 12-lead, complete Preliminary     4/7/2018 0754 Blood culture Preliminary     4/7/2018 0754 Blood culture Preliminary     4/7/2018 0754 Sputum Culture Aerobic Bacterial Preliminary     4/7/2018 0643 EKG 12-lead, tracing only - STAT Preliminary     4/6/2018 1717 EKG 12-lead, tracing only  Preliminary             Statement of Approval     Ordered          04/09/18 1310  I have reviewed and agree with all the recommendations and orders detailed in this document.  EFFECTIVE NOW     Approved and electronically signed by:  Yazmin Sotelo MD           04/09/18 0815  I have reviewed and agree with all the recommendations and orders detailed in this document.  EFFECTIVE NOW     Approved and electronically signed by:  Yazmin Sotelo MD             Admission Information     Date & Time Provider Department Dept. Phone    4/6/2018 Chauncey Gupta MD [address] [facility] [place] [phone]      Your Vitals Were     Blood Pressure Pulse Temperature Respirations Height Weight    109/67 81 98.2  F (36.8  C) (Oral) 18 1.702 m (5' 7\") 71.1 kg (156 lb 12 oz)    Last Period Pulse Oximetry BMI (Body Mass Index)             04/06/2018 97% 24.55 kg/m2 "         [facility] Information     [facility] gives you secure access to your electronic health record. If you see a primary care provider, you can also send messages to your care team and make appointments. If you have questions, please call your primary care clinic.  If you do not have a primary care provider, please call [phone] and they will assist you.        Care EveryWhere ID     This is your Care EveryWhere ID. This could be used by other organizations to access your [facility] medical records  [id number]        Equal Access to Services     GIL BALL : Hadii ishaan ku hadasho Soomaali, waaxda luqadaha, qaybta kaalmada adeegyada, waxbryn cho. So [facility] [phone].    ATENCIÓN: Si habla español, tiene a burgos disposición servicios gratuitos de asistencia lingüística. Llame al [phone].    We comply with applicable federal civil rights laws and [place] laws. We do not discriminate on the basis of race, color, national origin, age, disability, sex, sexual orientation, or gender identity.               Review of your medicines      START taking        Dose / Directions    levofloxacin 750 MG tablet   Commonly known as:  LEVAQUIN   Indication:  Healthcare-Associated Pneumonia   Used for:  Community acquired pneumonia of left lower lobe of lung (H)        Dose:  750 mg   Start taking on:  4/10/2018   Take 1 tablet (750 mg) by mouth daily for 8 days   Quantity:  8 tablet   Refills:  0         CONTINUE these medicines which may have CHANGED, or have new prescriptions. If we are uncertain of the size of tablets/capsules you have at home, strength may be listed as something that might have changed.        Dose / Directions    labetalol 300 MG tablet   Commonly known as:  NORMODYNE   This may have changed:  medication strength   Used for:  Essential hypertension        Dose:  300 mg   Take 1 tablet (300 mg) by mouth 2 times daily   Quantity:  60 tablet   Refills:  3         CONTINUE these  medicines which have NOT CHANGED        Dose / Directions    ferrous sulfate 325 (65 FE) MG tablet   Commonly known as:  IRON        Dose:  325 mg   Take 325 mg by mouth every other day   Refills:  0       vitamin B complex with vitamin C Tabs tablet        Dose:  1 tablet   Take 1 tablet by mouth daily   Refills:  0         STOP taking     XARELTO PO                Where to get your medicines      These medications were sent to [facility] - [place], [place] - [address]  [address] [address]     Phone:  [phone]     labetalol 300 MG tablet    levofloxacin 750 MG tablet                Protect others around you: Learn how to safely use, store and throw away your medicines at www.disposemymeds.org.        ANTIBIOTIC INSTRUCTION     You've Been Prescribed an Antibiotic - Now What?  Your healthcare team thinks that you or your loved one might have an infection. Some infections can be treated with antibiotics, which are powerful, life-saving drugs. Like all medications, antibiotics have side effects and should only be used when necessary. There are some important things you should know about your antibiotic treatment.      Your healthcare team may run tests before you start taking an antibiotic.    Your team may take samples (e.g., from your blood, urine or other areas) to run tests to look for bacteria. These test can be important to determine if you need an antibiotic at all and, if you do, which antibiotic will work best.      Within a few days, your healthcare team might change or even stop your antibiotic.    Your team may start you on an antibiotic while they are working to find out what is making you sick.    Your team might change your antibiotic because test results show that a different antibiotic would be better to treat your infection.    In some cases, once your team has more information, they learn that you do not need an antibiotic at all. They may find  out that you don't have an infection, or that the antibiotic you're taking won't work against your infection. For example, an infection caused by a virus can't be treated with antibiotics. Staying on an antibiotic when you don't need it is more likely to be harmful than helpful.      You may experience side effects from your antibiotic.    Like all medications, antibiotics have side effects. Some of these can be serious.    Let you healthcare team know if you have any known allergies when you are admitted to the hospital.    One significant side effect of nearly all antibiotics is the risk of severe and sometimes deadly diarrhea caused by Clostridium difficile (C. Difficile). This occurs when a person takes antibiotics because some good germs are destroyed. Antibiotic use allows C. diificile to take over, putting patients at high risk for this serious infection.    As a patient or caregiver, it is important to understand your or your loved one's antibiotic treatment. It is especially important for caregivers to speak up when patients can't speak for themselves. Here are some important questions to ask your healthcare team.    What infection is this antibiotic treating and how do you know I have that infection?    What side effects might occur from this antibiotic?    How long will I need to take this antibiotic?    Is it safe to take this antibiotic with other medications or supplements (e.g., vitamins) that I am taking?     Are there any special directions I need to know about taking this antibiotic? For example, should I take it with food?    How will I be monitored to know whether my infection is responding to the antibiotic?    What tests may help to make sure the right antibiotic is prescribed for me?      Information provided by:  www.cdc.gov/getsmart  U.S. Department of Health and Human Services  Centers for disease Control and Prevention  National Center for Emerging and Zoonotic Infectious  Diseases  Division of Healthcare Quality Promotion             Medication List: This is a list of all your medications and when to take them. Check marks below indicate your daily home schedule. Keep this list as a reference.      Medications           Morning Afternoon Evening Bedtime As Needed    ferrous sulfate 325 (65 FE) MG tablet   Commonly known as:  IRON   Take 325 mg by mouth every other day                                labetalol 300 MG tablet   Commonly known as:  NORMODYNE   Take 1 tablet (300 mg) by mouth 2 times daily   Last time this was given:  300 mg on 4/9/2018  8:28 AM                                levofloxacin 750 MG tablet   Commonly known as:  LEVAQUIN   Take 1 tablet (750 mg) by mouth daily for 8 days   Start taking on:  4/10/2018   Last time this was given:  750 mg on 4/9/2018  8:28 AM                                vitamin B complex with vitamin C Tabs tablet   Take 1 tablet by mouth daily

## 2018-04-06 NOTE — IP AVS SNAPSHOT
[address] [address]    Phone:  [phone]                                       After Visit Summary   4/6/2018    Heydi Kelley    MRN: [id number]           After Visit Summary Signature Page     I have received my discharge instructions, and my questions have been answered. I have discussed any challenges I see with this plan with the nurse or doctor.    ..........................................................................................................................................  Patient/Patient Representative Signature      ..........................................................................................................................................  Patient Representative Print Name and Relationship to Patient    ..................................................               ................................................  Date                                            Time    ..........................................................................................................................................  Reviewed by Signature/Title    ...................................................              ..............................................  Date                                                            Time

## 2018-04-07 ENCOUNTER — APPOINTMENT (OUTPATIENT)
Dept: CARDIOLOGY | Facility: CLINIC | Age: 58
End: 2018-04-07
Attending: INTERNAL MEDICINE
Payer: COMMERCIAL

## 2018-04-07 ENCOUNTER — APPOINTMENT (OUTPATIENT)
Dept: CT IMAGING | Facility: CLINIC | Age: 58
End: 2018-04-07
Attending: INTERNAL MEDICINE
Payer: COMMERCIAL

## 2018-04-07 LAB
ANION GAP SERPL CALCULATED.3IONS-SCNC: 7 MMOL/L (ref 3–14)
BLD PROD TYP BPU: NORMAL
BLD PROD TYP BPU: NORMAL
BLD UNIT ID BPU: 0
BLOOD PRODUCT CODE: NORMAL
BPU ID: NORMAL
BUN SERPL-MCNC: 11 MG/DL (ref 7–30)
CALCIUM SERPL-MCNC: 7.9 MG/DL (ref 8.5–10.1)
CHLORIDE SERPL-SCNC: 108 MMOL/L (ref 94–109)
CK SERPL-CCNC: 92 U/L (ref 30–225)
CO2 SERPL-SCNC: 26 MMOL/L (ref 20–32)
CREAT SERPL-MCNC: 0.88 MG/DL (ref 0.52–1.04)
ERYTHROCYTE [DISTWIDTH] IN BLOOD BY AUTOMATED COUNT: 13.7 % (ref 10–15)
ERYTHROCYTE [DISTWIDTH] IN BLOOD BY AUTOMATED COUNT: 13.9 % (ref 10–15)
GFR SERPL CREATININE-BSD FRML MDRD: 67 ML/MIN/1.7M2
GLUCOSE BLDC GLUCOMTR-MCNC: 113 MG/DL (ref 70–99)
GLUCOSE BLDC GLUCOMTR-MCNC: 127 MG/DL (ref 70–99)
GLUCOSE BLDC GLUCOMTR-MCNC: 156 MG/DL (ref 70–99)
GLUCOSE BLDC GLUCOMTR-MCNC: 207 MG/DL (ref 70–99)
GLUCOSE BLDC GLUCOMTR-MCNC: 67 MG/DL (ref 70–99)
GLUCOSE BLDC GLUCOMTR-MCNC: 90 MG/DL (ref 70–99)
GLUCOSE SERPL-MCNC: 215 MG/DL (ref 70–99)
HCT VFR BLD AUTO: 29.5 % (ref 35–47)
HCT VFR BLD AUTO: 33.3 % (ref 35–47)
HGB BLD-MCNC: 11 G/DL (ref 11.7–15.7)
HGB BLD-MCNC: 9.9 G/DL (ref 11.7–15.7)
MCH RBC QN AUTO: 31.3 PG (ref 26.5–33)
MCH RBC QN AUTO: 31.3 PG (ref 26.5–33)
MCHC RBC AUTO-ENTMCNC: 33 G/DL (ref 31.5–36.5)
MCHC RBC AUTO-ENTMCNC: 33.6 G/DL (ref 31.5–36.5)
MCV RBC AUTO: 93 FL (ref 78–100)
MCV RBC AUTO: 95 FL (ref 78–100)
NUM BPU REQUESTED: 1
PLATELET # BLD AUTO: 107 10E9/L (ref 150–450)
PLATELET # BLD AUTO: 86 10E9/L (ref 150–450)
POTASSIUM SERPL-SCNC: 3.8 MMOL/L (ref 3.4–5.3)
RBC # BLD AUTO: 3.16 10E12/L (ref 3.8–5.2)
RBC # BLD AUTO: 3.51 10E12/L (ref 3.8–5.2)
SODIUM SERPL-SCNC: 141 MMOL/L (ref 133–144)
TRANSFUSION STATUS PATIENT QL: NORMAL
TRANSFUSION STATUS PATIENT QL: NORMAL
TRIGL SERPL-MCNC: 79 MG/DL
WBC # BLD AUTO: 10.3 10E9/L (ref 4–11)
WBC # BLD AUTO: 6.9 10E9/L (ref 4–11)

## 2018-04-07 PROCEDURE — 87040 BLOOD CULTURE FOR BACTERIA: CPT | Performed by: STUDENT IN AN ORGANIZED HEALTH CARE EDUCATION/TRAINING PROGRAM

## 2018-04-07 PROCEDURE — 25000132 ZZH RX MED GY IP 250 OP 250 PS 637: Performed by: STUDENT IN AN ORGANIZED HEALTH CARE EDUCATION/TRAINING PROGRAM

## 2018-04-07 PROCEDURE — 25000128 H RX IP 250 OP 636: Performed by: INTERNAL MEDICINE

## 2018-04-07 PROCEDURE — 40000275 ZZH STATISTIC RCP TIME EA 10 MIN

## 2018-04-07 PROCEDURE — 71260 CT THORAX DX C+: CPT

## 2018-04-07 PROCEDURE — 82550 ASSAY OF CK (CPK): CPT

## 2018-04-07 PROCEDURE — 85027 COMPLETE CBC AUTOMATED: CPT | Performed by: STUDENT IN AN ORGANIZED HEALTH CARE EDUCATION/TRAINING PROGRAM

## 2018-04-07 PROCEDURE — 25000131 ZZH RX MED GY IP 250 OP 636 PS 637

## 2018-04-07 PROCEDURE — 25000128 H RX IP 250 OP 636: Performed by: RADIOLOGY

## 2018-04-07 PROCEDURE — 40000264 ECHO COMPLETE WITH OPTISON

## 2018-04-07 PROCEDURE — 99233 SBSQ HOSP IP/OBS HIGH 50: CPT | Mod: GC | Performed by: INTERNAL MEDICINE

## 2018-04-07 PROCEDURE — 25000125 ZZHC RX 250: Performed by: RADIOLOGY

## 2018-04-07 PROCEDURE — 93005 ELECTROCARDIOGRAM TRACING: CPT

## 2018-04-07 PROCEDURE — 25000128 H RX IP 250 OP 636

## 2018-04-07 PROCEDURE — 84478 ASSAY OF TRIGLYCERIDES: CPT

## 2018-04-07 PROCEDURE — 25800025 ZZH RX 258: Performed by: SURGERY

## 2018-04-07 PROCEDURE — 40000556 ZZH STATISTIC PERIPHERAL IV START W US GUIDANCE

## 2018-04-07 PROCEDURE — 94640 AIRWAY INHALATION TREATMENT: CPT

## 2018-04-07 PROCEDURE — 93010 ELECTROCARDIOGRAM REPORT: CPT | Mod: 76 | Performed by: INTERNAL MEDICINE

## 2018-04-07 PROCEDURE — 25000128 H RX IP 250 OP 636: Performed by: STUDENT IN AN ORGANIZED HEALTH CARE EDUCATION/TRAINING PROGRAM

## 2018-04-07 PROCEDURE — 25000125 ZZHC RX 250: Performed by: INTERNAL MEDICINE

## 2018-04-07 PROCEDURE — 40000281 ZZH STATISTIC TRANSPORT TIME EA 15 MIN

## 2018-04-07 PROCEDURE — 70450 CT HEAD/BRAIN W/O DYE: CPT

## 2018-04-07 PROCEDURE — 80048 BASIC METABOLIC PNL TOTAL CA: CPT

## 2018-04-07 PROCEDURE — 25000132 ZZH RX MED GY IP 250 OP 250 PS 637: Performed by: INTERNAL MEDICINE

## 2018-04-07 PROCEDURE — 40000014 ZZH STATISTIC ARTERIAL MONITORING DAILY

## 2018-04-07 PROCEDURE — 25800025 ZZH RX 258: Performed by: NURSE PRACTITIONER

## 2018-04-07 PROCEDURE — 94003 VENT MGMT INPAT SUBQ DAY: CPT

## 2018-04-07 PROCEDURE — 00000146 ZZHCL STATISTIC GLUCOSE BY METER IP

## 2018-04-07 PROCEDURE — 25500064 ZZH RX 255 OP 636: Performed by: INTERNAL MEDICINE

## 2018-04-07 PROCEDURE — 93306 TTE W/DOPPLER COMPLETE: CPT | Mod: 26 | Performed by: INTERNAL MEDICINE

## 2018-04-07 PROCEDURE — 20000004 ZZH R&B ICU UMMC

## 2018-04-07 PROCEDURE — 85027 COMPLETE CBC AUTOMATED: CPT

## 2018-04-07 PROCEDURE — P9037 PLATE PHERES LEUKOREDU IRRAD: HCPCS | Performed by: STUDENT IN AN ORGANIZED HEALTH CARE EDUCATION/TRAINING PROGRAM

## 2018-04-07 RX ORDER — PROPOFOL 10 MG/ML
10-20 INJECTION, EMULSION INTRAVENOUS EVERY 30 MIN PRN
Status: DISCONTINUED | OUTPATIENT
Start: 2018-04-07 | End: 2018-04-07

## 2018-04-07 RX ORDER — ACETAMINOPHEN 650 MG/1
650 SUPPOSITORY RECTAL ONCE
Status: DISCONTINUED | OUTPATIENT
Start: 2018-04-07 | End: 2018-04-08

## 2018-04-07 RX ORDER — LABETALOL 100 MG/1
300 TABLET, FILM COATED ORAL EVERY 12 HOURS SCHEDULED
Status: DISCONTINUED | OUTPATIENT
Start: 2018-04-07 | End: 2018-04-07

## 2018-04-07 RX ORDER — FENTANYL CITRATE 50 UG/ML
50 INJECTION, SOLUTION INTRAMUSCULAR; INTRAVENOUS ONCE
Status: COMPLETED | OUTPATIENT
Start: 2018-04-07 | End: 2018-04-07

## 2018-04-07 RX ORDER — ONDANSETRON 2 MG/ML
4 INJECTION INTRAMUSCULAR; INTRAVENOUS EVERY 6 HOURS PRN
Status: DISCONTINUED | OUTPATIENT
Start: 2018-04-07 | End: 2018-04-09 | Stop reason: HOSPADM

## 2018-04-07 RX ORDER — ALBUTEROL SULFATE 0.83 MG/ML
2.5 SOLUTION RESPIRATORY (INHALATION)
Status: DISCONTINUED | OUTPATIENT
Start: 2018-04-07 | End: 2018-04-08

## 2018-04-07 RX ORDER — LABETALOL HYDROCHLORIDE 5 MG/ML
5 INJECTION, SOLUTION INTRAVENOUS ONCE
Status: COMPLETED | OUTPATIENT
Start: 2018-04-07 | End: 2018-04-07

## 2018-04-07 RX ORDER — HYDRALAZINE HYDROCHLORIDE 20 MG/ML
10 INJECTION INTRAMUSCULAR; INTRAVENOUS ONCE
Status: COMPLETED | OUTPATIENT
Start: 2018-04-07 | End: 2018-04-07

## 2018-04-07 RX ORDER — LEVOFLOXACIN 750 MG/1
750 TABLET, FILM COATED ORAL DAILY
Status: DISCONTINUED | OUTPATIENT
Start: 2018-04-07 | End: 2018-04-09 | Stop reason: HOSPADM

## 2018-04-07 RX ORDER — ACETAMINOPHEN 325 MG/1
650 TABLET ORAL EVERY 4 HOURS PRN
Status: DISCONTINUED | OUTPATIENT
Start: 2018-04-07 | End: 2018-04-09 | Stop reason: HOSPADM

## 2018-04-07 RX ORDER — PROPOFOL 10 MG/ML
5-75 INJECTION, EMULSION INTRAVENOUS CONTINUOUS
Status: DISCONTINUED | OUTPATIENT
Start: 2018-04-07 | End: 2018-04-07

## 2018-04-07 RX ORDER — IOPAMIDOL 755 MG/ML
57 INJECTION, SOLUTION INTRAVASCULAR ONCE
Status: COMPLETED | OUTPATIENT
Start: 2018-04-07 | End: 2018-04-07

## 2018-04-07 RX ORDER — LABETALOL 100 MG/1
100 TABLET, FILM COATED ORAL EVERY 12 HOURS SCHEDULED
Status: DISCONTINUED | OUTPATIENT
Start: 2018-04-07 | End: 2018-04-09

## 2018-04-07 RX ADMIN — PROPOFOL 75 MCG/KG/MIN: 10 INJECTION, EMULSION INTRAVENOUS at 00:47

## 2018-04-07 RX ADMIN — LABETALOL HYDROCHLORIDE 300 MG: 300 TABLET, FILM COATED ORAL at 08:32

## 2018-04-07 RX ADMIN — Medication 5 MG: at 02:46

## 2018-04-07 RX ADMIN — LABETALOL HCL 100 MG: 100 TABLET, FILM COATED ORAL at 20:07

## 2018-04-07 RX ADMIN — FENTANYL CITRATE 25 MCG: 50 INJECTION INTRAMUSCULAR; INTRAVENOUS at 01:36

## 2018-04-07 RX ADMIN — DEXTROSE AND SODIUM CHLORIDE: 5; 450 INJECTION, SOLUTION INTRAVENOUS at 20:07

## 2018-04-07 RX ADMIN — IOPAMIDOL 57 ML: 755 INJECTION, SOLUTION INTRAVENOUS at 11:21

## 2018-04-07 RX ADMIN — ONDANSETRON 4 MG: 2 INJECTION INTRAMUSCULAR; INTRAVENOUS at 19:35

## 2018-04-07 RX ADMIN — PROPOFOL 55 MCG/KG/MIN: 10 INJECTION, EMULSION INTRAVENOUS at 07:56

## 2018-04-07 RX ADMIN — SODIUM CHLORIDE, PRESERVATIVE FREE 87 ML: 5 INJECTION INTRAVENOUS at 11:22

## 2018-04-07 RX ADMIN — ACETAMINOPHEN 650 MG: 325 TABLET, FILM COATED ORAL at 22:50

## 2018-04-07 RX ADMIN — ACETAMINOPHEN 650 MG: 325 TABLET, FILM COATED ORAL at 10:43

## 2018-04-07 RX ADMIN — HUMAN ALBUMIN MICROSPHERES AND PERFLUTREN 6 ML: 10; .22 INJECTION, SOLUTION INTRAVENOUS at 09:30

## 2018-04-07 RX ADMIN — ONDANSETRON 4 MG: 2 INJECTION INTRAMUSCULAR; INTRAVENOUS at 12:54

## 2018-04-07 RX ADMIN — DEXTROSE MONOHYDRATE 50 ML: 25 INJECTION, SOLUTION INTRAVENOUS at 02:30

## 2018-04-07 RX ADMIN — LEVOFLOXACIN 750 MG: 750 TABLET, FILM COATED ORAL at 10:43

## 2018-04-07 RX ADMIN — ACETAMINOPHEN 650 MG: 325 TABLET, FILM COATED ORAL at 17:39

## 2018-04-07 RX ADMIN — ONDANSETRON 4 MG: 2 INJECTION INTRAMUSCULAR; INTRAVENOUS at 08:42

## 2018-04-07 RX ADMIN — FENTANYL CITRATE 50 MCG: 50 INJECTION INTRAMUSCULAR; INTRAVENOUS at 02:24

## 2018-04-07 RX ADMIN — ALBUTEROL SULFATE 2.5 MG: 2.5 SOLUTION RESPIRATORY (INHALATION) at 22:27

## 2018-04-07 RX ADMIN — PROPOFOL 75 MCG/KG/MIN: 10 INJECTION, EMULSION INTRAVENOUS at 03:35

## 2018-04-07 RX ADMIN — HYDRALAZINE HYDROCHLORIDE 10 MG: 20 INJECTION INTRAMUSCULAR; INTRAVENOUS at 03:35

## 2018-04-07 RX ADMIN — DEXTROSE AND SODIUM CHLORIDE: 5; 450 INJECTION, SOLUTION INTRAVENOUS at 02:12

## 2018-04-07 RX ADMIN — FENTANYL CITRATE 50 MCG: 50 INJECTION INTRAMUSCULAR; INTRAVENOUS at 07:31

## 2018-04-07 ASSESSMENT — PAIN DESCRIPTION - DESCRIPTORS
DESCRIPTORS: ACHING
DESCRIPTORS: ACHING

## 2018-04-07 NOTE — PROGRESS NOTES
Cardiology Progress Note    Assessment & Plan   Assessment/ Plan: Heydi Kelley is a 57 year old female with history of congenital pulmonary stenosis, atrial septal defect, patent ductus arteriosus.  She underwent pulmonary valvuloplasty in 1962, 1964 and 1983 through a median sternotomy and has known severe pulmonary insufficiency but with normal right ventricular chamber size and function.  She also has a history of atrial arrhythmias, history of patent ductus arteriosus that was ligated in 1968 and then an atrial septal defect that was closed in 1983 who is now s/p LPA stent and 2 palmaz transhepatic biliary stents in the RVOT and 22 medtronic abbi valve on 4/6/18.     # Pulmonary stenosis and pulmonary insufficiency, s/p abbi valve and stent to left pulmonary artery c/b small LPA dissection- patient was bronch'd intraoperatively.   - Keept patient intubated with high PEEP.-> wean sedation and weaning trial to extubate this am  - Echo today  -Per Dr. Mccollum will hold xarelto and asa for one week.   - Tele   - unfortunately post extubation persisted with hemoptysis- CT PE and platelet transfusion for plts in the 80's and downtrending     # Hx of Paroxysmal Atrial flutter, on Xarelto  - Holding  Xarelto currently.   - Continue PTA Labetalol  - Tele      Dispo :Home  Pending extubation and absence of further signs of bleeding.       Maria Fernanda Sinlgeton   Cardiology fellow, PGY-4      Interval History   Did well overnight required some PRN medication for hypertension  Awake this am  Scant bloody secretions from ET     Physical Exam   Temp: 98.4  F (36.9  C) Temp src: Axillary BP: 96/76 (use art line per Cards MD)   Heart Rate: 96 Resp: 13 SpO2: 99 % O2 Device: Mechanical Ventilator    Vitals:    04/06/18 0548   Weight: 69.4 kg (153 lb)     Vital Signs with Ranges  Temp:  [97.9  F (36.6  C)-99.6  F (37.6  C)] 98.4  F (36.9  C)  Heart Rate:  [61-98] 96  Resp:  [12-14] 13  BP: ()/(67-88) 96/76  MAP:   "[69 mmHg-117 mmHg] 77 mmHg  Arterial Line BP: (103-180)/(45-74) 122/53  FiO2 (%):  [30 %-40 %] 30 %  SpO2:  [96 %-100 %] 99 %  I/O last 3 completed shifts:  In: 2341.9 [I.V.:2341.9]  Out: 2570 [Urine:2470; Blood:100]    Heart Rate: 96, Blood pressure 96/76, pulse 81, temperature 98.4  F (36.9  C), temperature source Axillary, resp. rate 13, height 1.702 m (5' 7\"), weight 69.4 kg (153 lb), last menstrual period 04/06/2018, SpO2 99 %.  152 lbs 15.99 oz  GEN:  Alert, oriented x 3, appears comfortable, following commands  CV:  Regular rate and rhythm, no murmur or JVD.  techycardic  LUNGS:  ronchi in bases  ABD:  Active bowel sounds, soft, non-tender/non-distended.  No rebound/guarding/rigidity.  EXT:  No edema or cyanosis.      Medications     dextrose 5% and 0.45% NaCl 50 mL/hr at 04/07/18 0400     propofol (DIPRIVAN) infusion 55 mcg/kg/min (04/07/18 0545)     Percutaneous Coronary Intervention orders placed (this is information for BPA alerting)       Reason antiplatelet medication not selected       - MEDICATION INSTRUCTIONS -       Reason ACE/ARB/ARNI order not selected       Reason statin medication order not selected         labetalol  300 mg Oral Q12H SHIRLEY       Data     Recent Labs  Lab 04/07/18  0308 04/06/18  1950 04/06/18  1620 04/06/18  1545  04/06/18  0646   WBC 6.9 7.4  --   --   --  4.4   HGB 11.0* 12.1 10.9* 9.9*  < > 12.5   MCV 95 94  --   --   --  95   PLT 86* 104*  --   --   --  153     --  138 137  < > 138   POTASSIUM 3.8  --  4.3 3.9  < > 4.2   CHLORIDE 108  --   --   --   --  105   CO2 26  --   --   --   --  27   BUN 11  --   --   --   --  12   CR 0.88  --   --   --   --  0.94   ANIONGAP 7  --   --   --   --  6   KEMAR 7.9*  --   --   --   --  8.8   *  --  131* 133*  < > 93   < > = values in this interval not displayed.    Recent Results (from the past 24 hour(s))   XR Chest Port 1 View    Narrative    XR CHEST PORT 1 VW  4/6/2018 6:43 PM      HISTORY: Endotracheal tube positioning; "     COMPARISON: None    FINDINGS: Single portable AP view of the chest upright. Endotracheal  tube tip projects over the mid thoracic trachea. Prosthetic pulmonary  valve construct in place. Intact median sternotomy wires. The cardiac  silhouette is enlarged. There are left retrocardiac opacities with the  suggestion of air bronchograms. The right lung is clear. No  pneumothorax. The visualized upper abdomen, osseous structures, and  soft tissues are unremarkable.      Impression    IMPRESSION:   1. Endotracheal tube tip projects over the midthoracic trachea.  2. Left retrocardiac opacities with question of air bronchograms.  Lateral view may be helpful in further evaluation.    I have personally reviewed the examination and initial interpretation  and I agree with the findings.    MARGUERITE CROCKETT MD   CT Head w/o Contrast    Narrative    CT HEAD W/O CONTRAST 4/7/2018 2:05 AM    Provided History: 56 y/o female with pulmonary artery dissection  intubated - non responsive with no sedation - evaluate for CVA;   ICD-10:    Comparison: Available.    Technique: Using multidetector thin collimation helical acquisition  technique, axial, coronal and sagittal CT images from the skull base  to the vertex were obtained without intravenous contrast.     Findings:    No intracranial hemorrhage, mass effect, or midline shift. The  ventricles are proportionate to the cerebral sulci. The gray to white  matter differentiation of the cerebral hemispheres is preserved. The  basal cisterns are patent.    The visualized paranasal sinuses are clear. The mastoid air cells are  clear.       Impression    Impression: No acute intracranial pathology.     I have seen and examined the patient and agree with the finding and plan.       Chauncey Gupta MD  Cardiology-I  [phone]

## 2018-04-07 NOTE — OP NOTE
Procedure Date: 2018      DATE OF PROCEDURE: 2018      PREOPERATIVE DIAGNOSES:   1.  Severe pulmonary valve regurgitation.   2.  Status post previous pulmonary valvulotomy via sternotomy approach.   3.  Left pulmonary artery stenosis.      POSTOPERATIVE DIAGNOSES:   1.  Severe pulmonary valve regurgitation.   2.  Status post previous pulmonary valvulotomy via sternotomy approach.   3.  Left pulmonary artery stenosis.      SURGICAL PROCEDURES PERFORMED:   1.  Insertion of Left pulmonary artery stent.   2.  Right ventricular outflow tract stent placement x2.   3.  Percutaneous Placement of the 22 mm Medtronic Jacqueline valve.      SURGEON:  Dr. Mccollum       COSURGEON:  Dr. Santana Feng      Please note, due to the complexity of the surgery and the unique nature of the surgery,  the cardiac surgeon's (Dr. Feng) presence is needed in the hybrid cath lab to ensure safe surgery .      Please see Dr. Mccollum's operative report for the details of the dictation and procedure.        I was present and available throughout the whole procedure in the hybrid operating room.         SANTANA FENG MD             D: 2018   T: 2018   MT: MD      Name:     EDWIN RAPP   MRN:      [id number]        Account:        [id number]   :      1960           Procedure Date: 2018      Document: [id number]       cc: [facility] Surgery Billing

## 2018-04-07 NOTE — PROGRESS NOTES
[facility], [place]     Extubation Procedure Note     Patient extubated at: April 7, 2018, 8:22 AM   Supplemental Oxygen: Via nasal cannula at 2 liters per minute   Cough: The cough is strong and productive   Secretion Mode: Able to clear   Secretion Amount: Scant amount, moderately thick and jatin in color   Respiratory Exam:: Breath sounds: good aeration     Location: bilaterally   Skin Exam:: Patient color: natural   Patient Status: Currently appears comfortable   Arterial Blood Gasses: pH Arterial (pH)   Date Value   04/06/2018 7.43     pO2 Arterial (mm Hg)   Date Value   04/06/2018 152 (H)     pCO2 Arterial (mm Hg)   Date Value   04/06/2018 39     Bicarbonate Arterial (mmol/L)   Date Value   04/06/2018 26            Recorded by Bassam Chavira

## 2018-04-07 NOTE — PLAN OF CARE
"Problem: Patient Care Overview  Goal: Plan of Care/Patient Progress Review  Outcome: Improving  N:  Pupils equal and reactive, 1+. This morning opens eyes spontaneously/ arouses to voice, moves all extremities to command, nods yes/no. Overnight patient had inconsistent movements in extremities: LUE more flexed, RUE more extended, withdrawing in all 4 consistently - Head CT was done, negative. Propofol gtt + fent PRN for sedation/comfort. Beginning to wean as tolerated. Restraints in place due to reaching for ETT.   C: NSR with PACs, HR trending up from 70's to 90's. SBP goal <160, labetalol and hydralazine utilized - no PRN, Cards ordering one time doses. Holding PO labetalol as hoping to extubate patient today. Blood pressure and art line do no correlate (cuff lower) - treat off art line per Cards. Murmur over left 2nd intercostal - Cards Fellow aware, \"expected\". Plan for echo today. 0630 patient nodded \"yes\" when asked if having pain and pointed to upper chest - cards notified, STAT ECG ordered. T max 99.6, trending down.   Sheath sites: Right  - no hematoma, KAREY, sutured (to be removed prior to discharge). Left - no hematoma, dressing CDI. DP pulses palpable bilaterally, doppler pedal pulses - right signal more weak, no change. Cap refill in toes >3 seconds and cool.   R: AC 30% 12/500/P10 - no weaning PEEP overnight, per cardiology. Will write orders to wean after rounds this morning. Possible extubate later today. Lungs coarse/wheeze left, clear/diminished right. Small bloody secretions continued from ETT.   GI: No enteral access. Bowel sounds active - passing gas.   : Raymundo with adequate urine output. Menses.   Access: R PIV X2, L PIV. Left radial art   Gtts: D5/0.45 @ 50 ml/hr after blood sugar treated 63 @ 0230 (D50), corrected to 156. + propofol gtt.     Plan: Continue to monitor and notify MD of changes. Wean sedation as tolerated, possible extubation. Echo.                "

## 2018-04-07 NOTE — PROGRESS NOTES
CLINICAL NUTRITION SERVICES - BRIEF NOTE    Received provider consult for nutrition education with comments Dietitian to see for heart healthy diet education. Pt currently remains intubated post-procedure, education not appropriate at this time. Education to be completed as able/appropriate    RD will follow per LOS protocol or if re-consulted.     Deisi Silvestre RD, LD, [facility]  CVICU Dietitian  Pager: [phone]

## 2018-04-07 NOTE — PLAN OF CARE
Problem: Restraint for Non-Violent/Non-Self-Destructive Behavior  Goal: Prevent/Manage Potential Problems  Maintain safety of patient and others during period of restraint.  Promote psychological and physical wellbeing.  Prevent injury to skin and involved body parts.  Promote nutrition, hydration, and elimination.   Outcome: No Change  Patient continues to reach for ETT, restraints continued.

## 2018-04-07 NOTE — PROGRESS NOTES
Adult Congenital  Cardiology Progress Note  [facility]          Assessment and Plan:   1.  Congenital pulmonary valve stenosis status post pulmonary valvuloplasty in 1962, 1964 and 1983 through a median sternotomy, with severe pulmonary insufficiency and normal right ventricular size and function.   2.  Atrial arrhythmias, initial event 08/2016 with atrial fibrillation/flutter and subsequent event 02/2016, now on Xarelto and labetalol.  3.  History of PDA, status post ligation in 1968 through a lateral thoracotomy at the [facility].   4.  Atrial septal defect, status post closure in 1983 at the [facility].   5.  Hypertension  6.  Left pulmonary artery stenosis with lung perfusion study 01/26/2018 showing 72%, going to the right lung compared to 27% to the left lung.  7. S/P successful 22 mm Jacqueline valve placement 4/6/18  8. S/P successful LPA stent placement 4/6/18   9. Small dissection of branch of the left lower lobe pulmonary artery at time of Jacqueline valve placement  10. Mild temp to 101 this AM    Noted CT head overnight for abnormal movements - negative.  At the bedside at extubation this AM; only dry blood.  Feeling nauseated but without pain; Zofran given.  Echo planned for this AM.  Blood cultures ordered. Would treat with 10 day course of Levaquin as suspect slight temp associated with respiratory process.  We will arrange outpatient follow up in Adult Congenital.    Z-stitch to be removed this AM; left femoral venous sheath is out. Has required no pressors and IV bumps of Labetalol overnight. Oral labetalol restarted this AM. Would hold Xarelto and ASA for one week.     JOSUE Mccollum MD [facility]  Adult Cogenital and Interventional Cardiology         History:   Ms. Kelley is a pleasant 57-year-old woman who is known to me.  I met her in 10/2017 with a past medical history significant for congenital pulmonary stenosis, atrial septal defect, patent ductus arteriosus.   She underwent pulmonary valvuloplasty in 1962, 1964 and 1983 through a median sternotomy and has known severe pulmonary insufficiency but with normal right ventricular chamber size and function.  She also has a history of atrial arrhythmias, history of patent ductus arteriosus that was ligated in 1968 and then an atrial septal defect that was closed in 1983.       When I met Ms. Kelley, we had discussed her history and her testing and her symptoms and discussed proceeding with a cardiopulmonary stress test, and we did a cardiac MRI and MRA, where she was found to have severe focal stenosis of the left proximal pulmonary artery with the lumen measuring 1 x 0.8 cm with severe pulmonary insufficiency due to restrictive closure of the right pulmonic cusp, normal RV systolic function and LV systolic function.  Her right ventricular end-diastolic volume was just mildly elevated at 102.    We did do an exercise study in which she went 62% of predicted.  Her VE/VCO2 slope was 27.2 with an RVR of 1.29.  She also underwent a nuclear medicine quantitative perfusion study that showed that she has 72% of her lung flow going to the right lung compared to 27% in the left.  After discussion at our [facility] conference, all were in agreement with proceeding with LPA stenting and Jacqueline Valve placement, which had done yesterday.                    Medications:       acetaminophen  650 mg Rectal Once     labetalol  300 mg Oral Q12H SHIRLEY     propofol (DIPRIVAN) infusion **AND** propofol **AND** CK total **AND** Triglycerides, ondansetron, nitroGLYcerin, naloxone, Percutaneous Coronary Intervention orders placed (this is information for BPA alerting), Reason antiplatelet medication not selected, - MEDICATION INSTRUCTIONS -, Reason ACE/ARB/ARNI order not selected, Reason statin medication order not selected, glucose **OR** dextrose **OR** glucagon, fentaNYL               Physical Exam:   Blood pressure 96/76, pulse 81, temperature 101.1  F  "(38.4  C), temperature source Axillary, resp. rate 14, height 1.702 m (5' 7\"), weight 71.1 kg (156 lb 12 oz), last menstrual period 2018, SpO2 99 %.  Wt Readings from Last 4 Encounters:   18 71.1 kg (156 lb 12 oz)   18 70.8 kg (156 lb)   10/13/17 73 kg (160 lb 15 oz)   10/13/17 73 kg (160 lb 14.4 oz)         Vital Sign Ranges  Temperature Temp  Av  F (37.2  C)  Min: 97.9  F (36.6  C)  Max: 101.1  F (38.4  C)   Blood pressure Systolic (24hrs), Av , Min:96 , Max:133        Diastolic (24hrs), Av, Min:67, Max:88      Pulse No Data Recorded   Respirations Resp  Av.8  Min: 12  Max: 14   Pulse oximetry SpO2  Av.8 %  Min: 96 %  Max: 100 %         Intake/Output Summary (Last 24 hours) at 18 0848  Last data filed at 18 0800   Gross per 24 hour   Intake          3016.98 ml   Output             2932 ml   Net            84.98 ml       Constitutional: Awake, alert, cooperative, no apparent distress   Lungs: Clear to auscultation bilaterally, no crackles or wheezing   Cardiovascular: Tachycardic. 2/6 soft systolic murmur. No diastolic murmur   Abdomen: Normal bowel sounds, soft, non-distended, non-tender   Skin: No rashes, no cyanosis, no edema   Other:           Data:     Recent Labs   Lab Test  18   03018   1950  18   1620   18   0646  17   WBC  6.9  7.4   --    --   4.4   < >  3.3*   HGB  11.0*  12.1  10.9*   < >  12.5   < >  12.8   MCV  95  94   --    --   95   < >  92   PLT  86*  104*   --    --   153   < >  174   INR   --    --    --    --    --    --   1.1    < > = values in this interval not displayed.      Recent Labs   Lab Test  18   03018   1620  18   1545   18   0646  10/13/17   1041   NA  141  138  137   < >  138  136   POTASSIUM  3.8  4.3  3.9   < >  4.2  4.0   CHLORIDE  108   --    --    --   105  102   CO2  26   --    --    --   27  28   BUN  11   --    --    --   12  11   CR  0.88   --    --    --   " 0.94  0.88   ANIONGAP  7   --    --    --   6  6   KEMAR  7.9*   --    --    --   8.8  8.8   GLC  215*  131*  133*   < >  93  87    < > = values in this interval not displayed.         Madhu Mccollum MD

## 2018-04-08 ENCOUNTER — TRANSFERRED RECORDS (OUTPATIENT)
Dept: HEALTH INFORMATION MANAGEMENT | Facility: CLINIC | Age: 58
End: 2018-04-08

## 2018-04-08 ENCOUNTER — APPOINTMENT (OUTPATIENT)
Dept: GENERAL RADIOLOGY | Facility: CLINIC | Age: 58
End: 2018-04-08
Attending: INTERNAL MEDICINE
Payer: COMMERCIAL

## 2018-04-08 LAB
ANION GAP SERPL CALCULATED.3IONS-SCNC: 5 MMOL/L (ref 3–14)
BASOPHILS # BLD AUTO: 0 10E9/L (ref 0–0.2)
BASOPHILS NFR BLD AUTO: 0.1 %
BLD PROD TYP BPU: NORMAL
BLD PROD TYP BPU: NORMAL
BLD UNIT ID BPU: 0
BLOOD PRODUCT CODE: NORMAL
BPU ID: NORMAL
BUN SERPL-MCNC: 9 MG/DL (ref 7–30)
CALCIUM SERPL-MCNC: 8.2 MG/DL (ref 8.5–10.1)
CHLORIDE SERPL-SCNC: 107 MMOL/L (ref 94–109)
CO2 SERPL-SCNC: 28 MMOL/L (ref 20–32)
CREAT SERPL-MCNC: 0.81 MG/DL (ref 0.52–1.04)
DIFFERENTIAL METHOD BLD: ABNORMAL
EOSINOPHIL # BLD AUTO: 0.1 10E9/L (ref 0–0.7)
EOSINOPHIL NFR BLD AUTO: 1.1 %
ERYTHROCYTE [DISTWIDTH] IN BLOOD BY AUTOMATED COUNT: 13.5 % (ref 10–15)
ERYTHROCYTE [DISTWIDTH] IN BLOOD BY AUTOMATED COUNT: 13.8 % (ref 10–15)
GFR SERPL CREATININE-BSD FRML MDRD: 74 ML/MIN/1.7M2
GLUCOSE BLDC GLUCOMTR-MCNC: 110 MG/DL (ref 70–99)
GLUCOSE BLDC GLUCOMTR-MCNC: 111 MG/DL (ref 70–99)
GLUCOSE BLDC GLUCOMTR-MCNC: 121 MG/DL (ref 70–99)
GLUCOSE SERPL-MCNC: 106 MG/DL (ref 70–99)
HCT VFR BLD AUTO: 30.6 % (ref 35–47)
HCT VFR BLD AUTO: 31.5 % (ref 35–47)
HGB BLD-MCNC: 10.3 G/DL (ref 11.7–15.7)
HGB BLD-MCNC: 9.9 G/DL (ref 11.7–15.7)
IMM GRANULOCYTES # BLD: 0 10E9/L (ref 0–0.4)
IMM GRANULOCYTES NFR BLD: 0.1 %
LYMPHOCYTES # BLD AUTO: 0.7 10E9/L (ref 0.8–5.3)
LYMPHOCYTES NFR BLD AUTO: 9.6 %
MCH RBC QN AUTO: 31.3 PG (ref 26.5–33)
MCH RBC QN AUTO: 31.6 PG (ref 26.5–33)
MCHC RBC AUTO-ENTMCNC: 32.4 G/DL (ref 31.5–36.5)
MCHC RBC AUTO-ENTMCNC: 32.7 G/DL (ref 31.5–36.5)
MCV RBC AUTO: 97 FL (ref 78–100)
MCV RBC AUTO: 97 FL (ref 78–100)
MONOCYTES # BLD AUTO: 0.7 10E9/L (ref 0–1.3)
MONOCYTES NFR BLD AUTO: 10 %
NEUTROPHILS # BLD AUTO: 5.5 10E9/L (ref 1.6–8.3)
NEUTROPHILS NFR BLD AUTO: 79.1 %
NRBC # BLD AUTO: 0 10*3/UL
NRBC BLD AUTO-RTO: 0 /100
NUM BPU REQUESTED: 1
PLATELET # BLD AUTO: 111 10E9/L (ref 150–450)
PLATELET # BLD AUTO: 88 10E9/L (ref 150–450)
POTASSIUM SERPL-SCNC: 3.6 MMOL/L (ref 3.4–5.3)
RBC # BLD AUTO: 3.16 10E12/L (ref 3.8–5.2)
RBC # BLD AUTO: 3.26 10E12/L (ref 3.8–5.2)
SODIUM SERPL-SCNC: 140 MMOL/L (ref 133–144)
TRANSFUSION STATUS PATIENT QL: NORMAL
TRANSFUSION STATUS PATIENT QL: NORMAL
WBC # BLD AUTO: 6.3 10E9/L (ref 4–11)
WBC # BLD AUTO: 7 10E9/L (ref 4–11)

## 2018-04-08 PROCEDURE — 36415 COLL VENOUS BLD VENIPUNCTURE: CPT | Performed by: INTERNAL MEDICINE

## 2018-04-08 PROCEDURE — 87070 CULTURE OTHR SPECIMN AEROBIC: CPT | Performed by: STUDENT IN AN ORGANIZED HEALTH CARE EDUCATION/TRAINING PROGRAM

## 2018-04-08 PROCEDURE — 20000004 ZZH R&B ICU UMMC

## 2018-04-08 PROCEDURE — 87205 SMEAR GRAM STAIN: CPT | Performed by: STUDENT IN AN ORGANIZED HEALTH CARE EDUCATION/TRAINING PROGRAM

## 2018-04-08 PROCEDURE — 40000556 ZZH STATISTIC PERIPHERAL IV START W US GUIDANCE

## 2018-04-08 PROCEDURE — 25000128 H RX IP 250 OP 636: Performed by: STUDENT IN AN ORGANIZED HEALTH CARE EDUCATION/TRAINING PROGRAM

## 2018-04-08 PROCEDURE — 25000132 ZZH RX MED GY IP 250 OP 250 PS 637: Performed by: STUDENT IN AN ORGANIZED HEALTH CARE EDUCATION/TRAINING PROGRAM

## 2018-04-08 PROCEDURE — P9037 PLATE PHERES LEUKOREDU IRRAD: HCPCS | Performed by: STUDENT IN AN ORGANIZED HEALTH CARE EDUCATION/TRAINING PROGRAM

## 2018-04-08 PROCEDURE — 99233 SBSQ HOSP IP/OBS HIGH 50: CPT | Mod: GC | Performed by: INTERNAL MEDICINE

## 2018-04-08 PROCEDURE — 25000125 ZZHC RX 250: Performed by: INTERNAL MEDICINE

## 2018-04-08 PROCEDURE — 80048 BASIC METABOLIC PNL TOTAL CA: CPT | Performed by: INTERNAL MEDICINE

## 2018-04-08 PROCEDURE — 85027 COMPLETE CBC AUTOMATED: CPT | Performed by: INTERNAL MEDICINE

## 2018-04-08 PROCEDURE — 40000275 ZZH STATISTIC RCP TIME EA 10 MIN

## 2018-04-08 PROCEDURE — 71046 X-RAY EXAM CHEST 2 VIEWS: CPT

## 2018-04-08 PROCEDURE — 00000146 ZZHCL STATISTIC GLUCOSE BY METER IP

## 2018-04-08 PROCEDURE — 99233 SBSQ HOSP IP/OBS HIGH 50: CPT | Performed by: INTERNAL MEDICINE

## 2018-04-08 PROCEDURE — 25000128 H RX IP 250 OP 636: Performed by: INTERNAL MEDICINE

## 2018-04-08 PROCEDURE — 85025 COMPLETE CBC W/AUTO DIFF WBC: CPT | Performed by: INTERNAL MEDICINE

## 2018-04-08 RX ORDER — POTASSIUM CL/LIDO/0.9 % NACL 10MEQ/0.1L
10 INTRAVENOUS SOLUTION, PIGGYBACK (ML) INTRAVENOUS
Status: COMPLETED | OUTPATIENT
Start: 2018-04-08 | End: 2018-04-08

## 2018-04-08 RX ORDER — POTASSIUM CHLORIDE 29.8 MG/ML
20 INJECTION INTRAVENOUS
Status: DISCONTINUED | OUTPATIENT
Start: 2018-04-08 | End: 2018-04-08

## 2018-04-08 RX ADMIN — ONDANSETRON 4 MG: 2 INJECTION INTRAMUSCULAR; INTRAVENOUS at 23:38

## 2018-04-08 RX ADMIN — Medication 10 MEQ: at 07:53

## 2018-04-08 RX ADMIN — LEVOFLOXACIN 750 MG: 750 TABLET, FILM COATED ORAL at 07:52

## 2018-04-08 RX ADMIN — LABETALOL HCL 100 MG: 100 TABLET, FILM COATED ORAL at 19:51

## 2018-04-08 RX ADMIN — Medication 10 MEQ: at 08:53

## 2018-04-08 RX ADMIN — ONDANSETRON 4 MG: 2 INJECTION INTRAMUSCULAR; INTRAVENOUS at 04:31

## 2018-04-08 RX ADMIN — ACETAMINOPHEN 650 MG: 325 TABLET, FILM COATED ORAL at 04:31

## 2018-04-08 RX ADMIN — ONDANSETRON 4 MG: 2 INJECTION INTRAMUSCULAR; INTRAVENOUS at 11:04

## 2018-04-08 RX ADMIN — ALBUTEROL SULFATE 2.5 MG: 2.5 SOLUTION RESPIRATORY (INHALATION) at 08:09

## 2018-04-08 RX ADMIN — LABETALOL HCL 100 MG: 100 TABLET, FILM COATED ORAL at 07:52

## 2018-04-08 RX ADMIN — Medication 10 MEQ: at 09:00

## 2018-04-08 RX ADMIN — Medication 10 MEQ: at 06:08

## 2018-04-08 RX ADMIN — ACETAMINOPHEN 650 MG: 325 TABLET, FILM COATED ORAL at 19:51

## 2018-04-08 ASSESSMENT — PAIN DESCRIPTION - DESCRIPTORS
DESCRIPTORS: ACHING

## 2018-04-08 NOTE — PLAN OF CARE
Problem: Patient Care Overview  Goal: Plan of Care/Patient Progress Review  Outcome: Improving  D/I:?Patient on unit 4A Surgical/Neuro ICU   Neuro-  AxO X4. No pain, no deficits   CV-  HR high 80s, BPs 140s/ 70s. Murmur detected (known) in left sternum.   Pulm- Coughing regularly with bloody production. Sats in mid 90s. 2 view Xray taken. Coughing has decreased over shift but still productive with blood tinge.   GI- Nausea. Zofran given x1. Regular diet but only had apple juice X2 for nutrition.   - WDL  Gtts- None.   Skin- Scars from past surgeries. Groin sites CDI from recent surgeries. Infiltration in right hand from PIV removed yesterday.  IV's/Drains- 2 PIVs in right arm.   Pain- Denies  See flow sheets for further interventions and assessments.   A: Stable   P:?Continue to monitor pt closely. Notify MD of significant changes.

## 2018-04-08 NOTE — PROGRESS NOTES
Adult Congenital  Cardiology Progress Note  [facility]          Assessment and Plan:   1.  Congenital pulmonary valve stenosis status post pulmonary valvuloplasty in 1962, 1964 and 1983 through a median sternotomy, with severe pulmonary insufficiency and normal right ventricular size and function.   2.  Atrial arrhythmias, initial event 08/2016 with atrial fibrillation/flutter and subsequent event 02/2016, now on Xarelto and labetalol.  3.  History of PDA, status post ligation in 1968 through a lateral thoracotomy at the [facility].   4.  Atrial septal defect, status post closure in 1983 at the [facility].   5.  Hypertension  6.  Left pulmonary artery stenosis with lung perfusion study 01/26/2018 showing 72%, going to the right lung compared to 27% to the left lung.  7. S/P successful 22 mm Jacqueline valve placement 4/6/18  8. S/P successful LPA stent placement 4/6/18   9. Small dissection of branch of the left lower lobe pulmonary artery at time of Jacqueline valve placement  10. Mild temp POD #1, resolved. On Levaquin 7-10 days    Hemoptysis improved today with only small amount and all dark.  CT chest without active bleed. No further temperature elevation.    Would hold ASA and Xarelto likely 1 month, but will decide when I see her in follow up in 1-2 weeks.   I am in [place] for congenital outreach clinic tomorrow, but please call with questions. Would increase Labetalol to home dose.  I suspect she will have scant amounts of dark blood for 1-2 weeks as she clears the bleeding remnants.     JSOUE Mccollum MD [facility]  Adult Congenital and Interventional Cardiology   Physicians  [phone]             Interval History:   Platelets X 1  CT chest without active bleed              Medications:       levofloxacin  750 mg Oral Daily     labetalol  100 mg Oral Q12H SHIRLEY     ondansetron, acetaminophen, Percutaneous Coronary Intervention orders placed (this is information for BPA  "alerting), Reason antiplatelet medication not selected, - MEDICATION INSTRUCTIONS -, Reason ACE/ARB/ARNI order not selected, Reason statin medication order not selected               Physical Exam:   Blood pressure 155/82, pulse 81, temperature 98.9  F (37.2  C), temperature source Oral, resp. rate 17, height 1.702 m (5' 7\"), weight 71.1 kg (156 lb 12 oz), last menstrual period 2018, SpO2 94 %.  Wt Readings from Last 4 Encounters:   18 71.1 kg (156 lb 12 oz)   18 70.8 kg (156 lb)   10/13/17 73 kg (160 lb 15 oz)   10/13/17 73 kg (160 lb 14.4 oz)         Vital Sign Ranges  Temperature Temp  Av.9  F (37.2  C)  Min: 98.4  F (36.9  C)  Max: 99.6  F (37.6  C)   Blood pressure Systolic (24hrs), Av , Min:111 , Max:155        Diastolic (24hrs), Av, Min:62, Max:93      Pulse No Data Recorded   Respirations Resp  Av.4  Min: 12  Max: 20   Pulse oximetry SpO2  Av.6 %  Min: 92 %  Max: 99 %         Intake/Output Summary (Last 24 hours) at 18 1551  Last data filed at 18 1500   Gross per 24 hour   Intake             1590 ml   Output             1365 ml   Net              225 ml       Constitutional: Awake, alert, cooperative, no apparent distress                   Other:           Data:     Recent Labs   Lab Test  18   1249  18   0451  18   1427  17   WBC  6.3  7.0  10.3   < >  3.3*   HGB  9.9*  10.3*  9.9*   < >  12.8   MCV  97  97  93   < >  92   PLT  111*  88*  107*   < >  174   INR   --    --    --    --   1.1    < > = values in this interval not displayed.      Recent Labs   Lab Test  18   0451  18   0308  18   1620   18   0646   NA  140  141  138   < >  138   POTASSIUM  3.6  3.8  4.3   < >  4.2   CHLORIDE  107  108   --    --   105   CO2  28  26   --    --   27   BUN  9  11   --    --   12   CR  0.81  0.88   --    --   0.94   ANIONGAP  5  7   --    --   6   KEMAR  8.2*  7.9*   --    --   8.8   GLC  106*  215*  131*   < >  93    " < > = values in this interval not displayed.         Madhu Mccollum MD

## 2018-04-08 NOTE — PLAN OF CARE
Problem: Patient Care Overview  Goal: Plan of Care/Patient Progress Review  Outcome: No Change  N: Alert, oriented X4. Withdrawn but cooperative. Moves all extremities to command with equal strength. Denies numbness/tingling. Pain controlled with PRN tylenol.   C: NSR 60-90's, PAC's. SBP <160 maintained without intervention. 110-130's. Scheduled oral labetalol. Sternal/chest aching - ongoing, cardiac MD aware. Murmur - no change. Pulses palpable, other than pedal pulses - doppler  (R more weak than L - no change). Tmax 99.1   R: Room air. Weak, productive cough - small amount of bright red sputum continued. Left lung exp wheeze/coarse, Right lung diminished. PRN albuterol neb X1. Sputum culture sent - [facility] notified of gram stain.   GI: Clear liquid diet, no appetite/ocassional nausea. No emesis. PRN zofran given X2, last @ 0430. No bowel movement this shift but passing gas. Denies abdominal discomfort.   : Voiding in bedpan without difficulty - refused up to commode overnight.   Skin: Groin sites - WDL, no hematoma. Old scaring on chest. Repositions self - refuses help with full turns.   Access: R PIV X2  Gtts: D5/0.45 @ 50 ml/hr.   Labs: K replaced, 1 U platelets - no rechecks ordered.     Plan: Continue to monitor bleeding, notify MD of changes. Recheck plts.

## 2018-04-08 NOTE — PROGRESS NOTES
Cardiology Progress Note    Assessment & Plan   Assessment/ Plan: Heydi Kelley is a 57 year old female with history of congenital pulmonary stenosis, atrial septal defect, patent ductus arteriosus.  She underwent pulmonary valvuloplasty in 1962, 1964 and 1983 through a median sternotomy and has known severe pulmonary insufficiency but with normal right ventricular chamber size and function.  She also has a history of atrial arrhythmias, history of patent ductus arteriosus that was ligated in 1968 and then an atrial septal defect that was closed in 1983 who is now s/p LPA stent and 2 palmaz transhepatic biliary stents in the RVOT and 22 medtronic abbi valve on 4/6/18.     # Pulmonary stenosis and pulmonary insufficiency, s/p abbi valve and stent to left pulmonary artery c/b small LPA dissection- patient was bronch'd intraoperatively, extubated yesterday  - monitor additional day, transfer out of ICU  - Per Dr. Mccollum will hold xarelto and asa for one week. Watch Hgb and platelets  - Tele      # Hx of Paroxysmal Atrial flutter, on Xarelto  - Holding  Xarelto currently. Restart 1 week  - Continue PTA Labetalol  - Tele     Dispo: likely home jalil Sotelo   Cardiology fellow, PGY-4      Interval History   Extubated  Still has some slight hemoptysis, improving    Physical Exam   Temp: 99.1  F (37.3  C) Temp src: Oral BP: (!) 140/93   Heart Rate: 82 Resp: 20 SpO2: 95 % O2 Device: None (Room air) Oxygen Delivery: 1 LPM  Vitals:    04/06/18 0548 04/07/18 0630   Weight: 69.4 kg (153 lb) 71.1 kg (156 lb 12 oz)     Vital Signs with Ranges  Temp:  [98.3  F (36.8  C)-99.6  F (37.6  C)] 99.1  F (37.3  C)  Heart Rate:  [67-98] 82  Resp:  [12-20] 20  BP: (103-140)/(62-93) 140/93  MAP:  [65 mmHg-74 mmHg] 73 mmHg  Arterial Line BP: (100-115)/(41-53) 105/50  SpO2:  [92 %-99 %] 95 %  I/O last 3 completed shifts:  In: 1665.4 [I.V.:1298.4]  Out: 900 [Urine:900]    Heart Rate: 82, Blood pressure (!) 140/93, pulse  "81, temperature 99.1  F (37.3  C), temperature source Oral, resp. rate 20, height 1.702 m (5' 7\"), weight 71.1 kg (156 lb 12 oz), last menstrual period 04/06/2018, SpO2 95 %.  156 lbs 11.95 oz  GEN:  Alert, oriented x 3, appears comfortable, following commands  CV:  Regular rate and rhythm, no murmur or JVD.  techycardic  LUNGS:  ronchi in bases  ABD:  Active bowel sounds, soft, non-tender/non-distended.  No rebound/guarding/rigidity.  EXT:  No edema or cyanosis.      Medications     Percutaneous Coronary Intervention orders placed (this is information for BPA alerting)       Reason antiplatelet medication not selected       - MEDICATION INSTRUCTIONS -       Reason ACE/ARB/ARNI order not selected       Reason statin medication order not selected         levofloxacin  750 mg Oral Daily     labetalol  100 mg Oral Q12H SHIRLEY       Data     Recent Labs  Lab 04/08/18  0451 04/07/18  1427 04/07/18  0308  04/06/18  1620  04/06/18  0646   WBC 7.0 10.3 6.9  < >  --   --  4.4   HGB 10.3* 9.9* 11.0*  < > 10.9*  < > 12.5   MCV 97 93 95  < >  --   --  95   PLT 88* 107* 86*  < >  --   --  153     --  141  --  138  < > 138   POTASSIUM 3.6  --  3.8  --  4.3  < > 4.2   CHLORIDE 107  --  108  --   --   --  105   CO2 28  --  26  --   --   --  27   BUN 9  --  11  --   --   --  12   CR 0.81  --  0.88  --   --   --  0.94   ANIONGAP 5  --  7  --   --   --  6   KEMAR 8.2*  --  7.9*  --   --   --  8.8   *  --  215*  --  131*  < > 93   < > = values in this interval not displayed.    Recent Results (from the past 24 hour(s))   CT Chest Pulmonary Embolism w Contrast    Narrative    CT pulmonary angiogram     History: Pulmonary artery stent without active bleed    Comparison: X-ray 4/6/2018, CT 1/5/2017    Technique: Helical acquisition of CT images of the chest from the lung  apices to the kidneys were acquired after the administration of  intravenous contrast according to the CT pulmonary angiogram protocol.  Three-dimensional (3D) " post-processed angiographic images were  reconstructed, archived to PACS and used in the interpretation of this  study.    Findings:   The contrast bolus is adequate.  There is no pulmonary embolism.  Postsurgical changes of pulmonary arterial stent placement within the  ventricular outflow tract and left pulmonary artery. Surrounding  streak artifact slightly limits evaluation. No active extravasation is  noted. Reflux of contrast within prominent hepatic veins and enlarged  right atrium. Right ventricle appears decompressed.    Heart size is prominent. No pericardial effusion. Consolidative and  groundglass changes in left greater than right lung base. No thoracic  lymphadenopathy. Central tracheobronchial tree is patent. No pleural  effusion or pneumothorax.    Bones and soft tissues: No suspicious bone findings. Sternotomy wires.  Pectus excavatum.    Partially imaged upper abdomen: No acute or suspicious finding.      Impression    Impression:    1. Postsurgical changes of right ventricular outflow tract and left  pulmonary arterial stent placement. No evidence for active  extravasation. Right atrial dilation and right ventricular  decompression.  2. No evidence for pulmonary embolus.  3. Left greater right basilar groundglass and consolidative opacities,  nonspecific and could represent infection/aspiration and/or  atelectasis or pulmonary edema or pulmonary hemorrhage.    I have personally reviewed the examination and initial interpretation  and I agree with the findings.    DERRICK MONTOYA MD ([place])     I have seen and examined the patient and agree with the finding and plan.       Chauncey Gupta MD  Cardiology-I  [phone]

## 2018-04-09 ENCOUNTER — CARE COORDINATION (OUTPATIENT)
Dept: CARDIOLOGY | Facility: CLINIC | Age: 58
End: 2018-04-09

## 2018-04-09 VITALS
DIASTOLIC BLOOD PRESSURE: 75 MMHG | HEIGHT: 67 IN | OXYGEN SATURATION: 96 % | SYSTOLIC BLOOD PRESSURE: 132 MMHG | BODY MASS INDEX: 24.6 KG/M2 | RESPIRATION RATE: 14 BRPM | TEMPERATURE: 98.4 F | HEART RATE: 81 BPM | WEIGHT: 156.75 LBS

## 2018-04-09 DIAGNOSIS — Z95.2 S/P PULMONARY VALVE REPLACEMENT: ICD-10-CM

## 2018-04-09 DIAGNOSIS — J98.4 PULMONARY INSUFFICIENCY: Primary | ICD-10-CM

## 2018-04-09 LAB
ANION GAP SERPL CALCULATED.3IONS-SCNC: 6 MMOL/L (ref 3–14)
BUN SERPL-MCNC: 9 MG/DL (ref 7–30)
CALCIUM SERPL-MCNC: 8.4 MG/DL (ref 8.5–10.1)
CHLORIDE SERPL-SCNC: 107 MMOL/L (ref 94–109)
CO2 SERPL-SCNC: 26 MMOL/L (ref 20–32)
CREAT SERPL-MCNC: 0.82 MG/DL (ref 0.52–1.04)
ERYTHROCYTE [DISTWIDTH] IN BLOOD BY AUTOMATED COUNT: 13.2 % (ref 10–15)
GFR SERPL CREATININE-BSD FRML MDRD: 73 ML/MIN/1.7M2
GLUCOSE BLDC GLUCOMTR-MCNC: 91 MG/DL (ref 70–99)
GLUCOSE SERPL-MCNC: 96 MG/DL (ref 70–99)
GRAM STN SPEC: ABNORMAL
HCT VFR BLD AUTO: 31.5 % (ref 35–47)
HGB BLD-MCNC: 10.2 G/DL (ref 11.7–15.7)
INTERPRETATION ECG - MUSE: NORMAL
Lab: ABNORMAL
MAGNESIUM SERPL-MCNC: 1.7 MG/DL (ref 1.6–2.3)
MCH RBC QN AUTO: 31.3 PG (ref 26.5–33)
MCHC RBC AUTO-ENTMCNC: 32.4 G/DL (ref 31.5–36.5)
MCV RBC AUTO: 97 FL (ref 78–100)
PLATELET # BLD AUTO: 113 10E9/L (ref 150–450)
POTASSIUM SERPL-SCNC: 3.9 MMOL/L (ref 3.4–5.3)
RBC # BLD AUTO: 3.26 10E12/L (ref 3.8–5.2)
SODIUM SERPL-SCNC: 138 MMOL/L (ref 133–144)
SPECIMEN SOURCE: ABNORMAL
WBC # BLD AUTO: 5.5 10E9/L (ref 4–11)

## 2018-04-09 PROCEDURE — 99238 HOSP IP/OBS DSCHRG MGMT 30/<: CPT | Mod: GC | Performed by: INTERNAL MEDICINE

## 2018-04-09 PROCEDURE — 83735 ASSAY OF MAGNESIUM: CPT | Performed by: STUDENT IN AN ORGANIZED HEALTH CARE EDUCATION/TRAINING PROGRAM

## 2018-04-09 PROCEDURE — 00000146 ZZHCL STATISTIC GLUCOSE BY METER IP

## 2018-04-09 PROCEDURE — 25000128 H RX IP 250 OP 636: Performed by: INTERNAL MEDICINE

## 2018-04-09 PROCEDURE — 80048 BASIC METABOLIC PNL TOTAL CA: CPT | Performed by: INTERNAL MEDICINE

## 2018-04-09 PROCEDURE — 25000132 ZZH RX MED GY IP 250 OP 250 PS 637: Performed by: INTERNAL MEDICINE

## 2018-04-09 PROCEDURE — 25000132 ZZH RX MED GY IP 250 OP 250 PS 637: Performed by: STUDENT IN AN ORGANIZED HEALTH CARE EDUCATION/TRAINING PROGRAM

## 2018-04-09 PROCEDURE — 85027 COMPLETE CBC AUTOMATED: CPT | Performed by: STUDENT IN AN ORGANIZED HEALTH CARE EDUCATION/TRAINING PROGRAM

## 2018-04-09 PROCEDURE — 25000125 ZZHC RX 250: Performed by: STUDENT IN AN ORGANIZED HEALTH CARE EDUCATION/TRAINING PROGRAM

## 2018-04-09 PROCEDURE — 36415 COLL VENOUS BLD VENIPUNCTURE: CPT | Performed by: STUDENT IN AN ORGANIZED HEALTH CARE EDUCATION/TRAINING PROGRAM

## 2018-04-09 PROCEDURE — 83735 ASSAY OF MAGNESIUM: CPT | Performed by: INTERNAL MEDICINE

## 2018-04-09 PROCEDURE — 36415 COLL VENOUS BLD VENIPUNCTURE: CPT | Performed by: INTERNAL MEDICINE

## 2018-04-09 RX ORDER — LEVOFLOXACIN 750 MG/1
750 TABLET, FILM COATED ORAL DAILY
Qty: 8 TABLET | Refills: 0 | Status: SHIPPED | OUTPATIENT
Start: 2018-04-10 | End: 2018-04-18

## 2018-04-09 RX ORDER — LABETALOL 300 MG/1
300 TABLET, FILM COATED ORAL 2 TIMES DAILY
Qty: 60 TABLET | Refills: 3 | Status: SHIPPED | OUTPATIENT
Start: 2018-04-09 | End: 2023-06-13

## 2018-04-09 RX ORDER — POTASSIUM CHLORIDE 750 MG/1
20 TABLET, EXTENDED RELEASE ORAL ONCE
Status: COMPLETED | OUTPATIENT
Start: 2018-04-09 | End: 2018-04-09

## 2018-04-09 RX ORDER — LABETALOL 300 MG/1
300 TABLET, FILM COATED ORAL EVERY 12 HOURS SCHEDULED
Status: DISCONTINUED | OUTPATIENT
Start: 2018-04-09 | End: 2018-04-09 | Stop reason: HOSPADM

## 2018-04-09 RX ORDER — AMOXICILLIN 250 MG
1 CAPSULE ORAL 2 TIMES DAILY PRN
Status: DISCONTINUED | OUTPATIENT
Start: 2018-04-09 | End: 2018-04-09 | Stop reason: HOSPADM

## 2018-04-09 RX ORDER — AMOXICILLIN 250 MG
2 CAPSULE ORAL 2 TIMES DAILY PRN
Status: DISCONTINUED | OUTPATIENT
Start: 2018-04-09 | End: 2018-04-09 | Stop reason: HOSPADM

## 2018-04-09 RX ADMIN — Medication 2 G: at 05:26

## 2018-04-09 RX ADMIN — ONDANSETRON 4 MG: 2 INJECTION INTRAMUSCULAR; INTRAVENOUS at 09:19

## 2018-04-09 RX ADMIN — ACETAMINOPHEN 650 MG: 325 TABLET, FILM COATED ORAL at 00:55

## 2018-04-09 RX ADMIN — LABETALOL HYDROCHLORIDE 300 MG: 300 TABLET, FILM COATED ORAL at 08:28

## 2018-04-09 RX ADMIN — LEVOFLOXACIN 750 MG: 750 TABLET, FILM COATED ORAL at 08:28

## 2018-04-09 RX ADMIN — POTASSIUM CHLORIDE 20 MEQ: 750 TABLET, EXTENDED RELEASE ORAL at 03:12

## 2018-04-09 ASSESSMENT — PAIN DESCRIPTION - DESCRIPTORS: DESCRIPTORS: ACHING

## 2018-04-09 NOTE — PROGRESS NOTES
Pt DCed from 4A this afternoon around 1430. Pt sent with all belongings and DC paperwork. RN went through discharge paperwork/instructions with pt. All questions answered appropriately. New scripts sent to [facility] pharmacy for pt pickup. Pt left in stable condition & in no pain. Pt still had scant amt of bloody sputum at time of DC. MD aware and ok with pt discharging to home and following up outpatient.     Pt escorted to hospital lobby by NST via wheelchair.

## 2018-04-09 NOTE — PLAN OF CARE
"Problem: Patient Care Overview  Goal: Plan of Care/Patient Progress Review  Outcome: No Change  6C overflow.      N: Alert, oriented X4. Withdrawn but cooperative. Moves all extremities to command with equal strength. Denies numbness/ tingling. Pain controlled with PRN tylenol and rest.   C: NSR 60-90's (with activity). Frequent PACs - 2 g mag (1.7) and 20 mEq K replaced (3.9) Few second run of SVT, 's at 0300 - self corrected, asymptomatic. Murmur unchanged.  SBP <160 without intervention. Scheduled oral labetalol - discussed increasing towards home dose (300 mg BID) with CSI. Pulses palpable, doppler pedal. Tmax 100.1, trending down with tylenol.   R: RA. Left lung coarse, right lung diminished. Fair, productive cough - bloody/clotted sputum. Less frequent than prior night.   GI: Regular diet. Ongoing \"queasy sensation\", PRN zofran given X1. No bowel movement this shift, but passing gas per patient. Denies abdominal discomfort. CSI aware, PRN senna added.   : Voids frequently in commode. Adequate, clear andrea urine.   Skin: Groin sites - WDL, no hematomas. Old scaring on chest. Repositions self.   Access: R PIV X2 - saline locked.   Activity: stand by assist.      Plan: Continue to monitor and notify MD of changes. Encourage pulmonary hygiene and activity.       "

## 2018-04-09 NOTE — DISCHARGE SUMMARY
[facility] Discharge Summary    Heydi Kelley MRN# [id number]   Age: 57 year old YOB: 1960     Date of Admission:  4/6/2018  Date of Discharge::  4/9/2018  Admitting Physician:  Madhu Mccollum MD  Discharge Physician:  Chauncey Gupta MD          Admission Diagnoses:   Pulmonary Left Artery Stenosis   Pulmonary valve replaced          Discharge Diagnosis:   Patient Active Problem List    Diagnosis Date Noted     Pulmonary valve replaced 04/06/2018     Priority: Medium     Stenosis of left pulmonary artery 03/12/2018     Priority: Medium     Pulmonic valve insufficiency 01/24/2018     Priority: Medium     Patient is followed by the [facility] 10/02/2017     Priority: Medium     For urgent after hours needs, please call [phone] and ask to speak with the Adult Congenital Heart Disease Physician on call - mention job code [id number].         Hyperlipidemia      Priority: Medium     Hypertension      Priority: Medium     S/P atrial septal defect closure      Priority: Medium     S/P PDA repair      Priority: Medium     S/P pulmonary valvulotomy      Priority: Medium     Atrial flutter (H)      Priority: Medium           Procedures:     Procedure:                Procedure(s):  Anesthesia out of OR Cath Lab #5 Jacqueline Valve, Coronary Angiogram, Right Heartcath, Left Pulmonary Artery Stent  Surgeon:                   Surgeon(s) and Role:     * GENERIC ANESTHESIA PROVIDER - Primary     * Madhu Mccollum MD - Assisting     * Jose Jeff MD - Assisting     * Santana Loo MD - Assisting  Anesthesia:               General                          Estimated blood loss:            100 cc     Specimens:               None     Complications:  Mild post-procedure hemoptysis with no evidence of repeat imaging of pulmonary artery tear;  Possible small distal alveolar hemorrhage. Management on-going with planned bronchoscopy.   1 unit of blood given        Implants:                     1) S/P Placement of LPA Stent with IntraStent LD Max 26 X 12 mm  2) S/P Placement of two 39 X 10 mm Palmaz XL Transhepatic Biliary stents in the RVOT   3) S/P Placement of 22 mm Medtronic Abbi Valve           Medications Prior to Admission:     Prescriptions Prior to Admission   Medication Sig Dispense Refill Last Dose     vitamin B complex with vitamin C (VITAMIN  B COMPLEX) TABS tablet Take 1 tablet by mouth daily   4/5/2018 at Unknown time     ferrous sulfate (IRON) 325 (65 FE) MG tablet Take 325 mg by mouth every other day   4/5/2018 at Unknown time     [DISCONTINUED] Rivaroxaban (XARELTO PO) Take 20 mg by mouth daily (with dinner)   4/2/2018 at Unknown time             Discharge Medications:     Current Discharge Medication List      START taking these medications    Details   levofloxacin (LEVAQUIN) 750 MG tablet Take 1 tablet (750 mg) by mouth daily for 8 days  Qty: 8 tablet, Refills: 0    Associated Diagnoses: Community acquired pneumonia of left lower lobe of lung (H)         CONTINUE these medications which have CHANGED    Details   labetalol (NORMODYNE) 300 MG tablet Take 1 tablet (300 mg) by mouth 2 times daily  Qty: 60 tablet, Refills: 3    Associated Diagnoses: Essential hypertension         CONTINUE these medications which have NOT CHANGED    Details   vitamin B complex with vitamin C (VITAMIN  B COMPLEX) TABS tablet Take 1 tablet by mouth daily      ferrous sulfate (IRON) 325 (65 FE) MG tablet Take 325 mg by mouth every other day         STOP taking these medications       Rivaroxaban (XARELTO PO) Comments:   Reason for Stopping:                     Consultations:   No consultations were requested during this admission         Hospital Course:     Heydi Kelley is a 57 year old female who was admitted for pulmonary artery and RVOT stent placement and new abbi pulmonic valve placement.  She has a history of congenital pulmonary stenosis,  atrial septal defect, patent ductus arteriosus.  She underwent pulmonary valvuloplasty in 1962, 1964 and 1983 through a median sternotomy and has known severe pulmonary insufficiency but with normal right ventricular chamber size and function.  She also has a history of atrial arrhythmias, history of patent ductus arteriosus that was ligated in 1968 and then an atrial septal defect that was closed in 1983 who is now s/p LPA stent and 2 palmaz transhepatic biliary stents in the RVOT and 22 medtronic abbi valve on 4/6/18.    Procedure complicated by small pulmonary artery dissection that caused some hemoptysis for patient.    She will hold her blood thinners until seeing Dr. Mccollum in clinic.  She will be sent home with levaquin as she had a mild fever postoperatively and a compromised barrier considering the bleeding.    Temp:  [98.4  F (36.9  C)-100.1  F (37.8  C)] 98.4  F (36.9  C)  Heart Rate:  [62-89] 65  Resp:  [10-20] 16  BP: (131-155)/(70-93) 142/84  SpO2:  [93 %-97 %] 95 %    GEN:  Alert, oriented x 3, appears comfortable, following commands  CV:  Regular rate and rhythm, no murmur or JVD.    LUNGS:  clear, on room air  ABD:  Active bowel sounds, soft, non-tender/non-distended.  No rebound/guarding/rigidity.  EXT:  No edema or cyanosis.           Discharge Instructions and Follow-Up:   Discharge diet: Regular   Discharge activity: Activity as tolerated   Discharge follow-up: Follow up with Dr. Mccollum in 3 weeks           Discharge Disposition:   Discharged to home     Yazmin Sotelo MD       I have seen and examined the patient and agree with the finding and plan.       Chauncey Gupta MD  Cardiology-[facility]  [phone]

## 2018-04-10 LAB
BACTERIA SPEC CULT: NORMAL
SPECIMEN SOURCE: NORMAL

## 2018-04-13 ENCOUNTER — CARE COORDINATION (OUTPATIENT)
Dept: CARDIOLOGY | Facility: CLINIC | Age: 58
End: 2018-04-13

## 2018-04-13 DIAGNOSIS — R04.2 HEMOPTYSIS: ICD-10-CM

## 2018-04-13 DIAGNOSIS — R49.0 HOARSENESS: ICD-10-CM

## 2018-04-13 DIAGNOSIS — Z95.2 S/P PULMONARY VALVE REPLACEMENT: Primary | ICD-10-CM

## 2018-04-13 LAB
BACTERIA SPEC CULT: NO GROWTH
BACTERIA SPEC CULT: NO GROWTH
SPECIMEN SOURCE: NORMAL
SPECIMEN SOURCE: NORMAL

## 2018-04-13 NOTE — PROGRESS NOTES
Called Heydi to discuss her My Chart messages.  She states that she feels like she is ready to go back to work. She states that when she coughs, she coughs up some brown clots- denies any bright red blood in the clots.  While talking to patient over the phone, her voice progressively become more high pitched and scratchy.  She thinks that her voice is getting better but her throat does hurt if she talks a lot. Discussed with Heydi that we would like to monitor how she feels over the next week and we may place a referral to ENT if things do not resolve. She is concerned about her valve with being off the Xarelto.  She has been taking her antibiotic and has noticed feelings of being hot and then cold shortly after that last for 20 minutes or so.  She has about three days left of the medication. Will update Dr Mccollum with patient status and call with further recommendations.  Heydi states that she understands information provided and will call with any further questions or concerns.     Tru Peterson, RN  RN Care Coordinator  [facility]  [phone]

## 2018-04-13 NOTE — LETTER
2018      RE: Heydi Kelley  [address] [address]   1960         To Whom It May Concern,    Mrs Heydi Kelley is a patient in the [facility] at the [facility].  She recently was admitted for a cardiac procedure on 2018.  Based on my evaluation, Heydi may return to work on Monday with no restrictions.    If you have any questions or concerns, please contact us at [phone].      Sincerely,      Madhu Mccollum MD

## 2018-04-27 ENCOUNTER — RADIANT APPOINTMENT (OUTPATIENT)
Dept: CARDIOLOGY | Facility: CLINIC | Age: 58
End: 2018-04-27
Payer: COMMERCIAL

## 2018-04-27 ENCOUNTER — RADIANT APPOINTMENT (OUTPATIENT)
Dept: GENERAL RADIOLOGY | Facility: CLINIC | Age: 58
End: 2018-04-27
Payer: COMMERCIAL

## 2018-04-27 ENCOUNTER — OFFICE VISIT (OUTPATIENT)
Dept: CARDIOLOGY | Facility: CLINIC | Age: 58
End: 2018-04-27
Attending: INTERNAL MEDICINE
Payer: COMMERCIAL

## 2018-04-27 VITALS
HEIGHT: 66 IN | WEIGHT: 156.5 LBS | HEART RATE: 70 BPM | BODY MASS INDEX: 25.15 KG/M2 | DIASTOLIC BLOOD PRESSURE: 65 MMHG | SYSTOLIC BLOOD PRESSURE: 101 MMHG | OXYGEN SATURATION: 97 %

## 2018-04-27 DIAGNOSIS — Q24.9 CONGENITAL HEART DISEASE: ICD-10-CM

## 2018-04-27 DIAGNOSIS — Z95.2 S/P PULMONARY VALVE REPLACEMENT: ICD-10-CM

## 2018-04-27 DIAGNOSIS — J98.4 PULMONARY INSUFFICIENCY: ICD-10-CM

## 2018-04-27 DIAGNOSIS — I48.92 ATRIAL FLUTTER, UNSPECIFIED TYPE (H): Primary | ICD-10-CM

## 2018-04-27 LAB
ALBUMIN SERPL-MCNC: 3.9 G/DL (ref 3.4–5)
ALP SERPL-CCNC: 63 U/L (ref 40–150)
ALT SERPL W P-5'-P-CCNC: 17 U/L (ref 0–50)
ANION GAP SERPL CALCULATED.3IONS-SCNC: 7 MMOL/L (ref 3–14)
AST SERPL W P-5'-P-CCNC: 17 U/L (ref 0–45)
BILIRUB SERPL-MCNC: 0.4 MG/DL (ref 0.2–1.3)
BUN SERPL-MCNC: 10 MG/DL (ref 7–30)
CALCIUM SERPL-MCNC: 8.9 MG/DL (ref 8.5–10.1)
CHLORIDE SERPL-SCNC: 104 MMOL/L (ref 94–109)
CO2 SERPL-SCNC: 28 MMOL/L (ref 20–32)
CREAT SERPL-MCNC: 0.87 MG/DL (ref 0.52–1.04)
ERYTHROCYTE [DISTWIDTH] IN BLOOD BY AUTOMATED COUNT: 12.9 % (ref 10–15)
GFR SERPL CREATININE-BSD FRML MDRD: 67 ML/MIN/1.7M2
GLUCOSE SERPL-MCNC: 88 MG/DL (ref 70–99)
HCG SERPL QL: NEGATIVE
HCT VFR BLD AUTO: 35.8 % (ref 35–47)
HGB BLD-MCNC: 11.8 G/DL (ref 11.7–15.7)
MCH RBC QN AUTO: 31.8 PG (ref 26.5–33)
MCHC RBC AUTO-ENTMCNC: 33 G/DL (ref 31.5–36.5)
MCV RBC AUTO: 97 FL (ref 78–100)
PLATELET # BLD AUTO: 140 10E9/L (ref 150–450)
POTASSIUM SERPL-SCNC: 4.2 MMOL/L (ref 3.4–5.3)
PROT SERPL-MCNC: 7.2 G/DL (ref 6.8–8.8)
RBC # BLD AUTO: 3.71 10E12/L (ref 3.8–5.2)
SODIUM SERPL-SCNC: 139 MMOL/L (ref 133–144)
WBC # BLD AUTO: 3.4 10E9/L (ref 4–11)

## 2018-04-27 PROCEDURE — 85027 COMPLETE CBC AUTOMATED: CPT | Performed by: INTERNAL MEDICINE

## 2018-04-27 PROCEDURE — 36415 COLL VENOUS BLD VENIPUNCTURE: CPT | Performed by: INTERNAL MEDICINE

## 2018-04-27 PROCEDURE — 80053 COMPREHEN METABOLIC PANEL: CPT | Performed by: INTERNAL MEDICINE

## 2018-04-27 PROCEDURE — G0463 HOSPITAL OUTPT CLINIC VISIT: HCPCS | Mod: 25,ZF

## 2018-04-27 PROCEDURE — 0296T ZIO PATCH HOLTER: CPT | Mod: ZF | Performed by: INTERNAL MEDICINE

## 2018-04-27 PROCEDURE — 0298T ZZC EXT ECG > 48HR TO 21 DAY REVIEW AND INTERPRETATN: CPT | Performed by: INTERNAL MEDICINE

## 2018-04-27 PROCEDURE — 99213 OFFICE O/P EST LOW 20 MIN: CPT | Mod: ZP | Performed by: INTERNAL MEDICINE

## 2018-04-27 PROCEDURE — 84703 CHORIONIC GONADOTROPIN ASSAY: CPT | Performed by: INTERNAL MEDICINE

## 2018-04-27 ASSESSMENT — PAIN SCALES - GENERAL: PAINLEVEL: NO PAIN (0)

## 2018-04-27 NOTE — LETTER
4/27/2018      RE: Heydi Kelley  [address] [facility] [address]       Dear Colleague,    Thank you for the opportunity to participate in the care of your patient, Heydi Kelley, at the [facility] at [facility]. Please see a copy of my visit note below.    HPI:     Please see dictated note    PAST MEDICAL HISTORY:  Past Medical History:   Diagnosis Date     Atrial flutter (H)      Hyperlipidemia      Hypertension      S/P atrial septal defect closure      S/P PDA repair      S/P pulmonary valvulotomy        CURRENT MEDICATIONS:  Current Outpatient Prescriptions   Medication Sig Dispense Refill     ferrous sulfate (IRON) 325 (65 FE) MG tablet Take 325 mg by mouth every other day       labetalol (NORMODYNE) 300 MG tablet Take 1 tablet (300 mg) by mouth 2 times daily 60 tablet 3     vitamin B complex with vitamin C (VITAMIN  B COMPLEX) TABS tablet Take 1 tablet by mouth daily         PAST SURGICAL HISTORY:  Past Surgical History:   Procedure Laterality Date     pulmonary valvulotomy         ALLERGIES   No Known Allergies    FAMILY HISTORY:  No family history on file.    SOCIAL HISTORY:  Social History     Social History     Marital status:      Spouse name: N/A     Number of children: N/A     Years of education: N/A     Social History Main Topics     Smoking status: Never Smoker     Smokeless tobacco: Never Used     Alcohol use No     Drug use: No     Sexual activity: Not Asked     Other Topics Concern     None     Social History Narrative       ROS:   Constitutional: No fever, chills, or sweats. No weight gain/loss   ENT: No visual disturbance, ear ache, epistaxis, sore throat  Allergies/Immunologic: Negative.   Respiratory: No cough, hemoptysia  Cardiovascular: As per HPI  GI: No nausea, vomiting, hematemesis, melena, or hematochezia  : No urinary frequency, dysuria, or hematuria  Integument: Negative  Psychiatric: Negative  Neuro:  "Negative  Endocrinology: Negative   Musculoskeletal: Negative    EXAM:  /65  Pulse 70  Ht 1.676 m (5' 6\")  Wt 71 kg (156 lb 8 oz)  LMP 04/06/2018  SpO2 97%  BMI 25.26 kg/m2  In general, the patient is a pleasant female in no apparent distress.    HEENT: NC/AT.  PERRLA.  EOMI.  Sclerae white, not injected.  Nares clear.  Pharynx without erythema or exudate.  Dentition intact.    Neck: No adenopathy.  No thyromegaly. Carotids +4/4 bilaterally without bruits.  No jugular venous distension.   Heart: RRR. Normal S1, S2 crisp. No diastolic murmur. RV not enlarged.    Lungs: CTA.  No ronchi, wheezes, rales.    Abdomen: Soft, nontender, nondistended.   Extremities: No clubbing, cyanosis, or edema.  The right radial artery is not palpable distally  Neurologic: Alert and oriented to person/place/time, normal speech, gait and affect  Skin: No petechiae, purpura or rash.    Labs:  LIPID RESULTS:  Lab Results   Component Value Date    CHOL 126 04/06/2018    HDL 53 04/06/2018    LDL 51 04/06/2018    TRIG 79 04/07/2018    NHDL 73 04/06/2018       LIVER ENZYME RESULTS:  Lab Results   Component Value Date    AST 17 04/27/2018    ALT 17 04/27/2018       CBC RESULTS:  Lab Results   Component Value Date    WBC 3.4 (L) 04/27/2018    RBC 3.71 (L) 04/27/2018    HGB 11.8 04/27/2018    HCT 35.8 04/27/2018    MCV 97 04/27/2018    MCH 31.8 04/27/2018    MCHC 33.0 04/27/2018    RDW 12.9 04/27/2018     (L) 04/27/2018       BMP RESULTS:  Lab Results   Component Value Date     04/27/2018    POTASSIUM 4.2 04/27/2018    CHLORIDE 104 04/27/2018    CO2 28 04/27/2018    ANIONGAP 7 04/27/2018    GLC 88 04/27/2018    BUN 10 04/27/2018    CR 0.87 04/27/2018    GFRESTIMATED 67 04/27/2018    GFRESTBLACK 81 04/27/2018    KEMAR 8.9 04/27/2018      Service Date: 04/27/2018      HISTORY OF PRESENT ILLNESS:  Ms. Kelley is a delightful 57-year-old woman who is known to me.  She has a past medical history significant for congenital " pulmonary stenosis, atrial septal defect and PDA undergoing pulmonary valvuloplasty in 1962, 1964 and 1983 through a median sternotomy.  She also has a history of a patent ductus arteriosus that was ligated in 1968 and the atrial septal defect that was closed in 1983.      After discussion at our Adult [facility] conference and discussion with regard to her symptoms where she was increasingly fatigued and she had a cardiopulmonary stress test in which she only 62% of predicted and the fact that she was found to have stenosis involving the LPA, we had a discussion in this setting with her severe PI and decided to go ahead and proceed with stent placement to her LPA and Jacqueline valve replacement.  She underwent this on 04/06/2018 with a 22 mm Jacqueline valve placed and placement of an LPA stent.  Her postoperative course was complicated by small dissection of the branch of her left lower pulmonary artery for which she remained in the hospital for 3 days and resolved.  We did hold her Xarelto, which she remains off of.  Since I saw her in the hospital, she has been doing fine.  She is no longer coughing up any dark blood and has started cardiac rehab.  Her echo today, I have reviewed.  Her valve is functioning well.  Her RV function is normal.  Her hemoglobin is up to 11.8 from 10.2, and her lungs are clear on chest x-ray.  She does have a history of atrial arrhythmia.  She had 2 events, in both of which she was symptomatic.  She is wondering about whether or not she actually needs to be put back on Xarelto.      IMPRESSION, REPORT, PLAN:   1.  Congenital pulmonary valve stenosis, status post pulmonary valvuloplasty in 1962, 1964 and 1983 through a median sternotomy with severe pulmonary insufficiency and normal right ventricular chamber size and function.   2.  Atrial arrhythmias, initial event 08/2016 with atrial fibrillation/flutter and subsequent event 02/2016, now on Xarelto and labetalol (Xarelto currently held).    3.  History of PDA, status post ligation in  through a lateral thoracotomy at the [facility].   4.  ASD status post closure in  at the [facility].   5.  Hypertension, controlled.   6.  LPA stenosis, status post LPA stent 2018.   7.  Severe pulmonary insufficiency, status post Jacqueline valve 2018 with a 22 mm valve.   8.  Small branch dissection of the left lower pulmonary artery at the time of the Jacqueline valve, resolved.      DISCUSSION:  It was a pleasure to see Ms. Kelley in followup.  Clinically, she is doing well without any concerning cardiac symptoms.  She has started cardiac rehab, which I encouraged her to continue.  She will take antibiotic prophylaxis before dental work.  She will be on a baby aspirin daily, which we should start.  She wondered whether or not she needs to restart the Xarelto, and actually I wonder if she actually could potentially come off Xarelto.  She now may be less likely to have atrial arrhythmias with her pulmonary insufficiency resolved, and she did only have 2 events, both of which were symptomatic.  I discussed doing a Zio Patch for 14 days.  If she has no arrhythmia, then I would feel comfortable having her come off of the Xarelto.  She is in agreement with this plan.  We will plan to see her back in 6 months with an echo or sooner if there are any issues in the interim.  It was a pleasure to see her.  Please do not hesitate to contact me with any questions or concerns.     D: 2018   T: 2018   MT: ZANE      Name:     EDWIN KELLEY   MRN:      [id number]        Account:      [id number]   :      1960           Service Date: 2018      Document: [id number]       JOSUE Mccollum MD     CC  Patient Care Team:  Nora Elias NP as PCP - General  Tru Peterson, RN as Nurse Coordinator (Cardiology)  Madhu Mccollum MD as MD (Cardiology)  SELF, REFERRED

## 2018-04-27 NOTE — NURSING NOTE
Per Dr. Madhu Mccollum, patient to have 14 day Zio monitor placed.  Diagnosis: Atrial flutter   Monitor placed: Yes  Patient Instructed: Yes  Patient verbalized understanding: Yes  Holter # [id number]  Placed by ALEXA Andrew

## 2018-04-27 NOTE — NURSING NOTE
Chief Complaint   Patient presents with     Follow Up For     57 year old female with history of ASD s/p closure, PDA s/p ligation, A flutter/ Afib, hypertension, hyperlipidemia, and pulmonary stenosis s/p pulmonary valvotomy x 3, s/p LPA stenting and Jacqueline valve placement 4/2018 presenting for follow up     Vitals were taken and medications were reconciled.     Zeenat Celestin RMA  1:46 PM

## 2018-04-27 NOTE — PROGRESS NOTES
"HPI:     Please see dictated note    PAST MEDICAL HISTORY:  Past Medical History:   Diagnosis Date     Atrial flutter (H)      Hyperlipidemia      Hypertension      S/P atrial septal defect closure      S/P PDA repair      S/P pulmonary valvulotomy        CURRENT MEDICATIONS:  Current Outpatient Prescriptions   Medication Sig Dispense Refill     ferrous sulfate (IRON) 325 (65 FE) MG tablet Take 325 mg by mouth every other day       labetalol (NORMODYNE) 300 MG tablet Take 1 tablet (300 mg) by mouth 2 times daily 60 tablet 3     vitamin B complex with vitamin C (VITAMIN  B COMPLEX) TABS tablet Take 1 tablet by mouth daily         PAST SURGICAL HISTORY:  Past Surgical History:   Procedure Laterality Date     pulmonary valvulotomy         ALLERGIES   No Known Allergies    FAMILY HISTORY:  No family history on file.    SOCIAL HISTORY:  Social History     Social History     Marital status:      Spouse name: N/A     Number of children: N/A     Years of education: N/A     Social History Main Topics     Smoking status: Never Smoker     Smokeless tobacco: Never Used     Alcohol use No     Drug use: No     Sexual activity: Not Asked     Other Topics Concern     None     Social History Narrative       ROS:   Constitutional: No fever, chills, or sweats. No weight gain/loss   ENT: No visual disturbance, ear ache, epistaxis, sore throat  Allergies/Immunologic: Negative.   Respiratory: No cough, hemoptysia  Cardiovascular: As per HPI  GI: No nausea, vomiting, hematemesis, melena, or hematochezia  : No urinary frequency, dysuria, or hematuria  Integument: Negative  Psychiatric: Negative  Neuro: Negative  Endocrinology: Negative   Musculoskeletal: Negative    EXAM:  /65  Pulse 70  Ht 1.676 m (5' 6\")  Wt 71 kg (156 lb 8 oz)  LMP 04/06/2018  SpO2 97%  BMI 25.26 kg/m2  In general, the patient is a pleasant female in no apparent distress.    HEENT: NC/AT.  PERRLA.  EOMI.  Sclerae white, not injected.  Nares clear.  " Pharynx without erythema or exudate.  Dentition intact.    Neck: No adenopathy.  No thyromegaly. Carotids +4/4 bilaterally without bruits.  No jugular venous distension.   Heart: RRR. Normal S1, S2 crisp. No diastolic murmur. RV not enlarged.    Lungs: CTA.  No ronchi, wheezes, rales.    Abdomen: Soft, nontender, nondistended.   Extremities: No clubbing, cyanosis, or edema.  The right radial artery is not palpable distally  Neurologic: Alert and oriented to person/place/time, normal speech, gait and affect  Skin: No petechiae, purpura or rash.    Labs:  LIPID RESULTS:  Lab Results   Component Value Date    CHOL 126 04/06/2018    HDL 53 04/06/2018    LDL 51 04/06/2018    TRIG 79 04/07/2018    NHDL 73 04/06/2018       LIVER ENZYME RESULTS:  Lab Results   Component Value Date    AST 17 04/27/2018    ALT 17 04/27/2018       CBC RESULTS:  Lab Results   Component Value Date    WBC 3.4 (L) 04/27/2018    RBC 3.71 (L) 04/27/2018    HGB 11.8 04/27/2018    HCT 35.8 04/27/2018    MCV 97 04/27/2018    MCH 31.8 04/27/2018    MCHC 33.0 04/27/2018    RDW 12.9 04/27/2018     (L) 04/27/2018       BMP RESULTS:  Lab Results   Component Value Date     04/27/2018    POTASSIUM 4.2 04/27/2018    CHLORIDE 104 04/27/2018    CO2 28 04/27/2018    ANIONGAP 7 04/27/2018    GLC 88 04/27/2018    BUN 10 04/27/2018    CR 0.87 04/27/2018    GFRESTIMATED 67 04/27/2018    GFRESTBLACK 81 04/27/2018    KEMAR 8.9 04/27/2018      JOSUE Mccollum MD     CC  Patient Care Team:  Nora Elias NP as PCP - General  Tru Peterson, RN as Nurse Coordinator (Cardiology)  Madhu Mccollum MD as MD (Cardiology)  SELF, REFERRED

## 2018-04-27 NOTE — PATIENT INSTRUCTIONS
"You were seen today in the Adult Congenital and Cardiovascular Genetics Clinic at the [facility].    Cardiology Providers you saw during your visit:  Dr Olivia Mccollum    Diagnosis:  Status post Jacqueline valve replacement     Results:  Dr Mccollum reviewed the results of your echo with you in clinic today    Recommendations:    1.  Continue to eat a heart healthy, low salt diet.  2.  Continue to get 20-30 minutes of aerobic activity, 4-5 days per week.  Examples of aerobic activity include walking, running, swimming, cycling, etc.  3.  Continue to observe good oral hygiene, with regular dental visits.  4.  We will put on a 14 day monitor today.  If you don't have any evidence of an arrhthymia, you may discontinue your Xarelto.   5.  We will repeat a cardiopulmonary stress test.  For this, nothing to eat or drink 3 hours prior.  No caffeine, alcohol, or tobacco 12 hours prior.      Vitals:    04/27/18 1344   BP: 101/65   Pulse: 70   SpO2: 97%   Weight: 71 kg (156 lb 8 oz)   Height: 1.676 m (5' 6\")     Exercise restrictions:   Yes____  No__x__         If yes, list restrictions:  Must be allowed to rest if fatigued or SOB      Work restrictions:  Yes____  No_x___         If yes, list restrictions:    FASTING CHOLESTEROL was checked in the last 5 years YES_x__  NO___  2017  Continue to eat a heart healthy, low salt diet.         ____ Fasting lipid panel order today         ____ No changes in medications          ____ I recommend the following changes in your cholesterol medications.:          ____ Please follow up for cholesterol screening at your primary care physician      Follow-up:  Follow up with Dr Mccollum in 6 months with an echo and CPX.    For after hours urgent needs, call [phone] and ask to speak to the Adult Congenital Physician on call.  Mention Job Code 0401.    For emergencies call 396.    For any scheduling needs, please call Rosalio Sotomayor Procedure , at [phone]  Thank you for " your visit today!  If you have questions or concerns about today's visit, please call me.    Tru Peterson RN, BSN  Cardiology Care Coordinator  [facility]  [phone]    [address]  Mail Code [id number]  [place] [address]

## 2018-04-27 NOTE — MR AVS SNAPSHOT
"              After Visit Summary   4/27/2018    Heydi Kelley    MRN: [id number]           Patient Information     Date Of Birth          1960        Visit Information        Provider Department      4/27/2018 2:00 PM Madhu Mccollum MD [facility]        Today's Diagnoses     Atrial flutter (H)    -  1    Pulmonary insufficiency        S/P pulmonary valve replacement          Care Instructions    You were seen today in the Adult Congenital and Cardiovascular Genetics Clinic at the [facility].    Cardiology Providers you saw during your visit:  Dr Olivia Mccollum    Diagnosis:  Status post Jacqueline valve replacement     Results:  Dr Mccollum reviewed the results of your echo with you in clinic today    Recommendations:    1.  Continue to eat a heart healthy, low salt diet.  2.  Continue to get 20-30 minutes of aerobic activity, 4-5 days per week.  Examples of aerobic activity include walking, running, swimming, cycling, etc.  3.  Continue to observe good oral hygiene, with regular dental visits.  4.  We will put on a 14 day monitor today.  If you don't have any evidence of an arrhthymia, you may discontinue your Xarelto.   5.  We will repeat a cardiopulmonary stress test.  For this, nothing to eat or drink 3 hours prior.  No caffeine, alcohol, or tobacco 12 hours prior.      Vitals:    04/27/18 1344   BP: 101/65   Pulse: 70   SpO2: 97%   Weight: 71 kg (156 lb 8 oz)   Height: 1.676 m (5' 6\")     Exercise restrictions:   Yes____  No__x__         If yes, list restrictions:  Must be allowed to rest if fatigued or SOB      Work restrictions:  Yes____  No_x___         If yes, list restrictions:    FASTING CHOLESTEROL was checked in the last 5 years YES_x__  NO___  2017  Continue to eat a heart healthy, low salt diet.         ____ Fasting lipid panel order today         ____ No changes in medications          ____ I recommend the following changes in your cholesterol medications.:          ____ " Please follow up for cholesterol screening at your primary care physician      Follow-up:  Follow up with Dr Mccollum in 6 months with an echo and CPX.    For after hours urgent needs, call [phone] and ask to speak to the Adult Congenital Physician on call.  Mention Job Code 0401.    For emergencies call 911.    For any scheduling needs, please call Rosalio Sotomayor, Procedure , at [phone]  Thank you for your visit today!  If you have questions or concerns about today's visit, please call me.    Tru Peterson, RN, BSN  Cardiology Care Coordinator  [facility]  [phone]    [address]  Mail Code [id number]  [place] [address]            Follow-ups after your visit        Additional Services     Follow-Up with Cardiologist       With echo and CPX                  Your next 10 appointments already scheduled     Nov 09, 2018 10:30 AM CST   Card Cardpul Stress Tst Adult with [facility]   [facility] ([facility])    [address] [facility] [address]               Nov 09, 2018  1:30 PM CST   Ech Complete with [address] Echo ([facility])    [address]  3rd Floor  [facility] [address] [phone]           1. Please bring or wear a comfortable two-piece outfit. 2. You may eat, drink and take your normal medicines. 3. For any questions that cannot be answered, please contact the ordering physician            Nov 09, 2018  3:00 PM CST   (Arrive by 2:45 PM)   RETURN CONGENITAL HEART with Madhu Mccollum MD   [facility] ([facility])    [address]  [address]  [facility] [address] [phone]              Future tests that were ordered for you today     Open Future Orders        Priority Expected Expires Ordered    Follow-Up with Cardiologist Routine 10/27/2018 4/28/2019 4/27/2018    Card Cardiopulmonary Stress  "Test - Adult Routine 10/27/2018 4/28/2019 4/27/2018    Echo Complete Routine 10/27/2018 4/28/2019 4/27/2018            Who to contact     If you have questions or need follow up information about today's clinic visit or your schedule please contact [facility] directly at [phone].  Normal or non-critical lab and imaging results will be communicated to you by MyChart, letter or phone within 4 business days after the clinic has received the results. If you do not hear from us within 7 days, please contact the clinic through AskUhart or phone. If you have a critical or abnormal lab result, we will notify you by phone as soon as possible.  Submit refill requests through Lootsie or call your pharmacy and they will forward the refill request to us. Please allow 3 business days for your refill to be completed.          Additional Information About Your Visit        AskUhart Information     Lootsie gives you secure access to your electronic health record. If you see a primary care provider, you can also send messages to your care team and make appointments. If you have questions, please call your primary care clinic.  If you do not have a primary care provider, please call [phone] and they will assist you.        Care EveryWhere ID     This is your Care EveryWhere ID. This could be used by other organizations to access your [place] medical records  [id number]        Your Vitals Were     Pulse Height Last Period Pulse Oximetry BMI (Body Mass Index)       70 1.676 m (5' 6\") 04/06/2018 97% 25.26 kg/m2        Blood Pressure from Last 3 Encounters:   04/27/18 101/65   04/09/18 132/75   01/26/18 111/73    Weight from Last 3 Encounters:   04/27/18 71 kg (156 lb 8 oz)   04/07/18 71.1 kg (156 lb 12 oz)   01/26/18 70.8 kg (156 lb)              We Performed the Following     Follow-Up with Cardiologist        Primary Care Provider Office Phone # Fax #    Nora Elias [phone]       [address] " [place] [id number] [facility] [id number]        Equal Access to Services     SLOANSKYE LIZZY : Hadii ishaan vnag troy Sokatherynali, wathuda luqadaha, qaybta kalolyda biamonsefeliz, marsha collins venkateshjoe callecristianasergey cho. So [facility] [phone].    ATENCIÓN: Si habla español, tiene a burgos disposición servicios gratuitos de asistencia lingüística. Llame al [phone].    We comply with applicable federal civil rights laws and [place] laws. We do not discriminate on the basis of race, color, national origin, age, disability, sex, sexual orientation, or gender identity.            Thank you!     Thank you for choosing [facility]  for your care. Our goal is always to provide you with excellent care. Hearing back from our patients is one way we can continue to improve our services. Please take a few minutes to complete the written survey that you may receive in the mail after your visit with us. Thank you!             Your Updated Medication List - Protect others around you: Learn how to safely use, store and throw away your medicines at www.disposemymeds.org.          This list is accurate as of 4/27/18  3:40 PM.  Always use your most recent med list.                   Brand Name Dispense Instructions for use Diagnosis    ferrous sulfate 325 (65 Fe) MG tablet    IRON     Take 325 mg by mouth every other day        labetalol 300 MG tablet    NORMODYNE    60 tablet    Take 1 tablet (300 mg) by mouth 2 times daily    Essential hypertension       vitamin B complex with vitamin C Tabs tablet      Take 1 tablet by mouth daily

## 2018-04-27 NOTE — NURSING NOTE
Cardiac Monitors: Patient was instructed regarding the indication, function, care and prompt return of a Zio monitor. The monitor was placed on the patient with instructions regarding care of the skin electrodes and monitor, as well as documentation in the patient diary. Patient demonstrated understanding of this information and agreed to call with further questions or concerns.    Cardiac Testing: Patient given instructions regarding  echocardiogram and CPX. Discussed purpose, preparation, procedure and when to expect results reported back to the patient. Patient demonstrated understanding of this information and agreed to call with further questions or concerns.    Med Reconcile: Reviewed and verified all current medications with the patient. The updated medication list was printed and given to the patient.    Return Appointment: Follow up with Dr Mccollum in 6 months with echo and CPX. Patient given instructions regarding scheduling next clinic visit. Patient demonstrated understanding of this information and agreed to call with further questions or concerns.    Patient stated she understood all health information given and agreed to call with further questions or concerns.    Tru Peterson RN  RN Care Coordinator  [facility]  [phone]

## 2018-04-28 NOTE — PROGRESS NOTES
Service Date: 04/27/2018      HISTORY OF PRESENT ILLNESS:  Ms. Kelley is a delightful 57-year-old woman who is known to me.  She has a past medical history significant for congenital pulmonary stenosis, atrial septal defect and PDA undergoing pulmonary valvuloplasty in 1962, 1964 and 1983 through a median sternotomy.  She also has a history of a patent ductus arteriosus that was ligated in 1968 and the atrial septal defect that was closed in 1983.      After discussion at our Adult Congenital conference and discussion with regard to her symptoms where she was increasingly fatigued and she had a cardiopulmonary stress test in which she only 62% of predicted and the fact that she was found to have stenosis involving the LPA, we had a discussion in this setting with her severe PI and decided to go ahead and proceed with stent placement to her LPA and Jacqueline valve replacement.  She underwent this on 04/06/2018 with a 22 mm Jacqueline valve placed and placement of an LPA stent.  Her postoperative course was complicated by small dissection of the branch of her left lower pulmonary artery for which she remained in the hospital for 3 days and resolved.  We did hold her Xarelto, which she remains off of.  Since I saw her in the hospital, she has been doing fine.  She is no longer coughing up any dark blood and has started cardiac rehab.  Her echo today, I have reviewed.  Her valve is functioning well.  Her RV function is normal.  Her hemoglobin is up to 11.8 from 10.2, and her lungs are clear on chest x-ray.  She does have a history of atrial arrhythmia.  She had 2 events, in both of which she was symptomatic.  She is wondering about whether or not she actually needs to be put back on Xarelto.      IMPRESSION, REPORT, PLAN:   1.  Congenital pulmonary valve stenosis, status post pulmonary valvuloplasty in 1962, 1964 and 1983 through a median sternotomy with severe pulmonary insufficiency and normal right ventricular chamber size  and function.   2.  Atrial arrhythmias, initial event 2016 with atrial fibrillation/flutter and subsequent event 2016, now on Xarelto and labetalol (Xarelto currently held).   3.  History of PDA, status post ligation in  through a lateral thoracotomy at the [facility].   4.  ASD status post closure in  at the [facility].   5.  Hypertension, controlled.   6.  LPA stenosis, status post LPA stent 2018.   7.  Severe pulmonary insufficiency, status post Jacqueline valve 2018 with a 22 mm valve.   8.  Small branch dissection of the left lower pulmonary artery at the time of the Jacqueline valve, resolved.      DISCUSSION:  It was a pleasure to see Ms. Rapp in followup.  Clinically, she is doing well without any concerning cardiac symptoms.  She has started cardiac rehab, which I encouraged her to continue.  She will take antibiotic prophylaxis before dental work.  She will be on a baby aspirin daily, which we should start.  She wondered whether or not she needs to restart the Xarelto, and actually I wonder if she actually could potentially come off Xarelto.  She now may be less likely to have atrial arrhythmias with her pulmonary insufficiency resolved, and she did only have 2 events, both of which were symptomatic.  I discussed doing a Zio Patch for 14 days.  If she has no arrhythmia, then I would feel comfortable having her come off of the Xarelto.  She is in agreement with this plan.  We will plan to see her back in 6 months with an echo or sooner if there are any issues in the interim.  It was a pleasure to see her.  Please do not hesitate to contact me with any questions or concerns.         JAY SAUCEDO MD             D: 2018   T: 2018   MT: ZAEN      Name:     EDWIN RAPP   MRN:      [id number]        Account:      [id number]   :      1960           Service Date: 2018      Document: [id number]

## 2018-06-08 NOTE — OP NOTE
Procedure Date: 2018      PREOPERATIVE DIAGNOSES:   1.  Severe pulmonary valve regurgitation.   2.  Status post previous pulmonary valvulotomy via sternotomy approach.   3.  Left pulmonary artery stenosis.      POSTOPERATIVE DIAGNOSES:   1.  Severe pulmonary valve regurgitation.   2.  Status post previous pulmonary valvulotomy via sternotomy approach.   3.  Left pulmonary artery stenosis.      SURGICAL PROCEDURES PERFORMED:   1.  Insertion of the left pulmonary artery stent.   2.  Right ventricular outflow tract stent placement x2.   3.  Percutaneously placement of the 22 mm [employer]onic Jacqueline valve.      SURGEON:  Olivia Mccollum.     Cosurgeon is Dr. Jhony Feng.      Please note, due to the complexity of the surgery and the unique nature of the procedure and the high risk nature of the procedure the cardiac surgeon, Dr. Santana Feng is requested to be present throughout the whole procedure in the hybrid operating room to insure safe surgery.  Please see Dr. Mccollum's operative report for the details.  I was on standby from 10:00 Am in the morning to 4:00 Pm in the afternoon for these procedures.         SANTANA FENG MD             D: 2018   T: 2018   MT: NTS      Name:     EDWIN RAPP   MRN:      [id number]        Account:        [id number]   :      1960           Procedure Date: 2018      Document: [id number]       cc: [facility] Billing

## 2018-06-29 ENCOUNTER — CARE COORDINATION (OUTPATIENT)
Dept: CARDIOLOGY | Facility: CLINIC | Age: 58
End: 2018-06-29

## 2018-06-29 DIAGNOSIS — Z95.2 STATUS POST PULMONARY VALVE REPLACEMENT: Primary | ICD-10-CM

## 2018-11-06 ENCOUNTER — PATIENT OUTREACH (OUTPATIENT)
Dept: CARE COORDINATION | Facility: CLINIC | Age: 58
End: 2018-11-06

## 2018-11-09 ENCOUNTER — HOSPITAL ENCOUNTER (OUTPATIENT)
Dept: CARDIOLOGY | Facility: CLINIC | Age: 58
Discharge: HOME | End: 2018-11-09
Attending: INTERNAL MEDICINE | Admitting: INTERNAL MEDICINE
Payer: COMMERCIAL

## 2018-11-09 ENCOUNTER — OFFICE VISIT (OUTPATIENT)
Dept: CARDIOLOGY | Facility: CLINIC | Age: 58
End: 2018-11-09
Attending: INTERNAL MEDICINE
Payer: COMMERCIAL

## 2018-11-09 ENCOUNTER — RADIANT APPOINTMENT (OUTPATIENT)
Dept: CARDIOLOGY | Facility: CLINIC | Age: 58
End: 2018-11-09
Payer: COMMERCIAL

## 2018-11-09 VITALS
SYSTOLIC BLOOD PRESSURE: 130 MMHG | WEIGHT: 154 LBS | HEART RATE: 73 BPM | OXYGEN SATURATION: 100 % | HEIGHT: 66 IN | DIASTOLIC BLOOD PRESSURE: 79 MMHG | BODY MASS INDEX: 24.75 KG/M2

## 2018-11-09 DIAGNOSIS — J98.4 PULMONARY INSUFFICIENCY: ICD-10-CM

## 2018-11-09 DIAGNOSIS — Z95.2 S/P PULMONARY VALVE REPLACEMENT: ICD-10-CM

## 2018-11-09 PROCEDURE — 94621 CARDIOPULM EXERCISE TESTING: CPT | Performed by: INTERNAL MEDICINE

## 2018-11-09 PROCEDURE — 94621 CARDIOPULM EXERCISE TESTING: CPT | Mod: 26 | Performed by: INTERNAL MEDICINE

## 2018-11-09 PROCEDURE — 99214 OFFICE O/P EST MOD 30 MIN: CPT | Mod: 25 | Performed by: INTERNAL MEDICINE

## 2018-11-09 PROCEDURE — G0463 HOSPITAL OUTPT CLINIC VISIT: HCPCS | Mod: ZF

## 2018-11-09 RX ORDER — UBIDECARENONE 75 MG
100 CAPSULE ORAL DAILY
COMMUNITY

## 2018-11-09 RX ORDER — AMOXICILLIN 500 MG/1
CAPSULE ORAL
Qty: 4 CAPSULE | Refills: 3 | Status: SHIPPED | OUTPATIENT
Start: 2018-11-09

## 2018-11-09 ASSESSMENT — PAIN SCALES - GENERAL: PAINLEVEL: NO PAIN (0)

## 2018-11-09 NOTE — NURSING NOTE
Cardiac Testing: Patient given instructions regarding  echocardiogram . Discussed purpose, preparation, procedure and when to expect results reported back to the patient. Patient demonstrated understanding of this information and agreed to call with further questions or concerns.    Med Reconcile: Reviewed and verified all current medications with the patient. The updated medication list was printed and given to the patient.    Return Appointment: Follow up with Dr Mccollum in April with an echo. Patient given instructions regarding scheduling next clinic visit. Patient demonstrated understanding of this information and agreed to call with further questions or concerns.    Patient stated she understood all health information given and agreed to call with further questions or concerns.    Tru Peterson, RN  RN Care Coordinator  [facility]  [phone]

## 2018-11-09 NOTE — LETTER
11/9/2018      RE: Heydi Kelley  [address] [facility] [address]       Dear Colleague,    Thank you for the opportunity to participate in the care of your patient, Heydi Kelley, at the [facility]. Please see a copy of my visit note below.    Service Date: 11/09/2018      HISTORY OF PRESENT ILLNESS:  Ms. Kelley is a very pleasant 58-year-old woman whose past medical history is significant for congenital pulmonary stenosis, atrial septal defect and PDA, undergoing pulmonary valvuloplasty in 1962, 1964 and 1983 through a median sternotomy.  She also has a history of patent ductus arteriosus that was ligated in 1968 and atrial septal defect that was closed in 1983.  Heydi underwent a Jacqueline valve with LPA stent on 04/06/2018.  She had a 22 mm Jacqueline valve placed and placement of an LPA stent at that time.  Her postoperative course was complicated by a small dissection of a branch of the left lower pulmonary artery, but she did well with no subsequent issues.  Her CPX was performed today and compared to that from before surgery, and she improved going 65% compared to 62% of predicted.  Her RER was actually less, and there was an overall improvement, which I was glad to see.  She is continuing to exercise regularly.  In fact, this summer she did a couple of 5Ks and note that she came in just behind the 80-year-old first place winner, which was [employer].  Her echo is unchanged.  Her valve is doing well.  She has not had any concerning symptoms.      IMPRESSION, REPORT, PLAN:   1.  Congenital pulmonary valve stenosis, status post pulmonary valvuloplasty in 1962, 1964 and 1983 through a median sternotomy with severe pulmonary insufficiency and normal right ventricular chamber size and function.   2.  Atrial arrhythmias, initial event 08/2016 with atrial fibrillation/flutter, subsequent event 02/2017, now on Xarelto and labetalol.   3.  History of PDA,  status post ligation in  through a lateral thoracotomy at the [facility].   4.  ASD closure in  at the [facility].   5.  Hypertension, controlled.   6.  LPA stenosis, status post LPA stent 2018 [facility].   7.  Severe pulmonary insufficiency, status post Jacqueline valve 2018 with a 22 mm valve.   8.  Small branch dissection of the left lower pulmonary artery at the time of the Jacqueline valve, resolved.   9.  Normal coronary arteries at time of cath.   10.  Normal cholesterol.      DISCUSSION:  It was a pleasure to see Ms. Rapp in followup.  Clinically, she is doing well from a cardiovascular standpoint.  Her CPX shows improvement.  Her echo is stable and unchanged.  She has not had any concerning systemic symptoms.  She is remaining active and exercising.  She was wondering whether she needs a coronary calcium scan.  Her coronary arteries at the time of her Jacqueline valve placement were completely normal without any evidence of stenosis, and so we do not need to do that.  We will continue otherwise her medications as prescribed.  She knows to take antibiotics before dental work.  We will plan to see her back in 6 months with an echo or sooner if there are any issues in the interim.  It was a pleasure to see her.  Please do not hesitate to contact me with any questions or concerns.         JAY SAUCEDO MD             D: 2018   T: 2018   MT: ZANE      Name:     EDWIN RAPP   MRN:      [id number]        Account:      [id number]   :      1960           Service Date: 2018      Document: [id number]

## 2018-11-09 NOTE — PATIENT INSTRUCTIONS
"You were seen today in the Adult Congenital and Cardiovascular Genetics Clinic at the [facility].    Cardiology Providers you saw during your visit:  Dr Olivia Mccollum    Diagnosis:  History of pulmonary valve replacement     Results:  Dr Mccollum reviewed the results of your echo and CPX with you in clinic today    Recommendations:    1.  Continue to eat a heart healthy, low salt diet.  2.  Continue to get 20-30 minutes of aerobic activity, 4-5 days per week.  Examples of aerobic activity include walking, running, swimming, cycling, etc.  3.  Continue to observe good oral hygiene, with regular dental visits.        Vitals:    11/09/18 1342   BP: 130/79   BP Location: Left arm   Patient Position: Chair   Cuff Size: Adult Regular   Pulse: 73   SpO2: 100%   Weight: 69.9 kg (154 lb)   Height: 1.676 m (5' 6\")       SBE prophylaxis:   Yes__x__  No____  Amoxicillin 2,000 mg 30-60 min prior to dental work    Lifelong Bacterial Endocarditis Prophylaxis:  YES____  NO____    If YES is checked, follow the recommendations outlined below:   1. Take antibiotic(s) prior to recommended dental procedures and procedures on the respiratory tract or with infected skin, muscle or bones. SBE prophylaxis is not needed for routine GI and  procedures (ie. Colonoscopy or vaginal delivery)   2. Observe good oral hygiene daily, as advised by your dentist. Get regular professional dental care.   3. Keep cuts clean.   4. Infections should be treated promptly.   5. Symptoms of Infective Endocarditis could include: fever lasting more than 4-5 days or a recurrent fever that initially resolves but returns within 1-2 days)     Exercise restrictions:   Yes____  No__x__         If yes, list restrictions:  Must be allowed to rest if fatigued or SOB      Work restrictions:  Yes____  No__x__         If yes, list restrictions:    FASTING CHOLESTEROL was checked in the last 5 years YES_x__  NO___  2017  Continue to eat a heart healthy, low salt " diet.         ____ Fasting lipid panel order today         ____ No changes in medications          ____ I recommend the following changes in your cholesterol medications.:          ____ Please follow up for cholesterol screening at your primary care physician      Follow-up:  Follow up with Dr Mccollum in 6 months with an echocardiogram.     For after hours urgent needs, call [phone] and ask to speak to the Adult Congenital Physician on call.  Mention Job Code 0401.    For emergencies call [phone].    For any scheduling needs, please call [facility] Procedure , at [phone]  Thank you for your visit today!  If you have questions or concerns about today's visit, please call me.    Tru Peterson, RN, BSN  Cardiology Care Coordinator  [facility]  [phone]    [address]  Mail Code [id number]  [place] [address]

## 2018-11-09 NOTE — PROGRESS NOTES
HPI:     Please see dictated note    PAST MEDICAL HISTORY:  Past Medical History:   Diagnosis Date     Atrial flutter (H)      Hyperlipidemia      Hypertension      S/P atrial septal defect closure      S/P PDA repair      S/P pulmonary valvulotomy        CURRENT MEDICATIONS:  Current Outpatient Prescriptions   Medication Sig Dispense Refill     aspirin 81 MG tablet Take 1 tablet (81 mg) by mouth daily 90 tablet 3     cyanocolbalamin (VITAMIN  B-12) 100 MCG tablet Take 100 mcg by mouth daily       ferrous sulfate (IRON) 325 (65 FE) MG tablet Take 325 mg by mouth every other day       labetalol (NORMODYNE) 300 MG tablet Take 1 tablet (300 mg) by mouth 2 times daily 60 tablet 3     vitamin B complex with vitamin C (VITAMIN  B COMPLEX) TABS tablet Take 1 tablet by mouth daily         PAST SURGICAL HISTORY:  Past Surgical History:   Procedure Laterality Date     pulmonary valvulotomy         ALLERGIES   No Known Allergies    FAMILY HISTORY:  No family history on file.    SOCIAL HISTORY:  Social History     Social History     Marital status:      Spouse name: N/A     Number of children: N/A     Years of education: N/A     Social History Main Topics     Smoking status: Never Smoker     Smokeless tobacco: Never Used     Alcohol use No     Drug use: No     Sexual activity: Not Asked     Other Topics Concern     None     Social History Narrative       ROS:   Constitutional: No fever, chills, or sweats. No weight gain/loss   ENT: No visual disturbance, ear ache, epistaxis, sore throat  Allergies/Immunologic: Negative.   Respiratory: No cough, hemoptysia  Cardiovascular: As per HPI  GI: No nausea, vomiting, hematemesis, melena, or hematochezia  : No urinary frequency, dysuria, or hematuria  Integument: Negative  Psychiatric: Negative  Neuro: Negative  Endocrinology: Negative   Musculoskeletal: Negative    EXAM:  /79 (BP Location: Left arm, Patient Position: Chair, Cuff Size: Adult Regular)  Pulse 73  Ht 1.676  "m (5' 6\")  Wt 69.9 kg (154 lb)  SpO2 100%  BMI 24.86 kg/m2  In general, the patient is a pleasant female in no apparent distress.    HEENT: NC/AT.  PERRLA.  EOMI.  Sclerae white, not injected.  Nares clear.  Pharynx without erythema or exudate.  Dentition intact.    Neck: No adenopathy.  No thyromegaly. Carotids +4/4 bilaterally without bruits.  No jugular venous distension.   Heart: RRR. Normal S1, S2 splits physiologically. No murmur, rub, click, or gallop. The PMI is in the 5th ICS in the midclavicular line. There is no heave.    Lungs: CTA.  No ronchi, wheezes, rales.  No dullness to percussion.   Abdomen: Soft, nontender, nondistended. No organomegaly.  No bruits.   Extremities: No clubbing, cyanosis, or edema.  The pulses are +4/4 at the radial, brachial, femoral, popliteal, DP, and PT sites bilaterally.  No bruits are noted.  Neurologic: Alert and oriented to person/place/time, normal speech, gait and affect  Skin: No petechiae, purpura or rash.    Labs:  LIPID RESULTS:  Lab Results   Component Value Date    CHOL 126 04/06/2018    HDL 53 04/06/2018    LDL 51 04/06/2018    TRIG 79 04/07/2018    NHDL 73 04/06/2018       LIVER ENZYME RESULTS:  Lab Results   Component Value Date    AST 17 04/27/2018    ALT 17 04/27/2018       CBC RESULTS:  Lab Results   Component Value Date    WBC 3.4 (L) 04/27/2018    RBC 3.71 (L) 04/27/2018    HGB 11.8 04/27/2018    HCT 35.8 04/27/2018    MCV 97 04/27/2018    MCH 31.8 04/27/2018    MCHC 33.0 04/27/2018    RDW 12.9 04/27/2018     (L) 04/27/2018       BMP RESULTS:  Lab Results   Component Value Date     04/27/2018    POTASSIUM 4.2 04/27/2018    CHLORIDE 104 04/27/2018    CO2 28 04/27/2018    ANIONGAP 7 04/27/2018    GLC 88 04/27/2018    BUN 10 04/27/2018    CR 0.87 04/27/2018    GFRESTIMATED 67 04/27/2018    GFRESTBLACK 81 04/27/2018    KEMAR 8.9 04/27/2018        A1C RESULTS:  No results found for: A1C    INR RESULTS:  Lab Results   Component Value Date    INR 1.1 " 02/09/2017       JOSUE Mccollum MD [facility]  Adult Congenital and Interventional Cardiology   [facility]    CC  Patient Care Team:  Nora Elias NP as PCP - Tru Garcia, RN as Nurse Coordinator (Cardiology)  Madhu Mccollum MD as MD (Cardiology)  SELF, REFERRED

## 2018-11-09 NOTE — NURSING NOTE
Vitals completed successfully and medication reconciled. Destiney Schmidt MA    Chief Complaint   Patient presents with     Follow Up For     58 year old female with history of ASD s/p closure, PDA s/p ligation, A flutter/ Afib, hypertension, hyperlipidemia, and pulmonary stenosis s/p pulmonary valvotomy x 3, s/p LPA stenting and Jacqueline valve placement 4/2018 presenting for follow up

## 2018-11-09 NOTE — MR AVS SNAPSHOT
"              After Visit Summary   11/9/2018    Heydi Kelley    MRN: [id number]           Patient Information     Date Of Birth          1960        Visit Information        Provider Department      11/9/2018 3:00 PM Madhu Mccollum MD [facility]        Today's Diagnoses     Pulmonary insufficiency        S/P pulmonary valve replacement          Care Instructions    You were seen today in the Adult Congenital and Cardiovascular Genetics Clinic at the [facility].    Cardiology Providers you saw during your visit:  Dr Olivia Mccollum    Diagnosis:  History of pulmonary valve replacement     Results:  Dr Mccollum reviewed the results of your echo and CPX with you in clinic today    Recommendations:    1.  Continue to eat a heart healthy, low salt diet.  2.  Continue to get 20-30 minutes of aerobic activity, 4-5 days per week.  Examples of aerobic activity include walking, running, swimming, cycling, etc.  3.  Continue to observe good oral hygiene, with regular dental visits.        Vitals:    11/09/18 1342   BP: 130/79   BP Location: Left arm   Patient Position: Chair   Cuff Size: Adult Regular   Pulse: 73   SpO2: 100%   Weight: 69.9 kg (154 lb)   Height: 1.676 m (5' 6\")       SBE prophylaxis:   Yes__x__  No____  Amoxicillin 2,000 mg 30-60 min prior to dental work    Lifelong Bacterial Endocarditis Prophylaxis:  YES____  NO____    If YES is checked, follow the recommendations outlined below:   1. Take antibiotic(s) prior to recommended dental procedures and procedures on the respiratory tract or with infected skin, muscle or bones. SBE prophylaxis is not needed for routine GI and  procedures (ie. Colonoscopy or vaginal delivery)   2. Observe good oral hygiene daily, as advised by your dentist. Get regular professional dental care.   3. Keep cuts clean.   4. Infections should be treated promptly.   5. Symptoms of Infective Endocarditis could include: fever lasting more than 4-5 days or a " recurrent fever that initially resolves but returns within 1-2 days)     Exercise restrictions:   Yes____  No__x__         If yes, list restrictions:  Must be allowed to rest if fatigued or SOB      Work restrictions:  Yes____  No__x__         If yes, list restrictions:    FASTING CHOLESTEROL was checked in the last 5 years YES_x__  NO___  2017  Continue to eat a heart healthy, low salt diet.         ____ Fasting lipid panel order today         ____ No changes in medications          ____ I recommend the following changes in your cholesterol medications.:          ____ Please follow up for cholesterol screening at your primary care physician      Follow-up:  Follow up with Dr Mccollum in 6 months with an echocardiogram.     For after hours urgent needs, call [phone] and ask to speak to the Adult Congenital Physician on call.  Mention Job Code 0401.    For emergencies call 911.    For any scheduling needs, please call [facility] Procedure , at [phone]  Thank you for your visit today!  If you have questions or concerns about today's visit, please call me.    Tru Peterson RN, BSN  Cardiology Care Coordinator  [facility]  [phone]    [address]  Mail Code [id number]  [place] [address]            Follow-ups after your visit        Additional Services     Adult Congenital and CV Genetics Clinic       With echocardiogram                  Your next 10 appointments already scheduled     Apr 12, 2019 11:00 AM CDT   Ech Congenital Adult with [id number]   [facility] ([facility])    [address]  3rd Floor  [facility] [address] [phone]           1.  Please bring or wear a comfortable two-piece outfit. 2.  You may eat, drink and take your normal medicines. 3.  For any questions that cannot be answered, please contact the ordering physician 4.  Please do not wear perfumes or scented lotions on the day of your exam.         "    Apr 12, 2019 12:00 PM CDT   (Arrive by 11:45 AM)   RETURN CONGENITAL HEART with Madhu Mccollum MD   [facility])    [address] [place] [address]  [address] [place] [address] [phone]              Future tests that were ordered for you today     Open Future Orders        Priority Expected Expires Ordered    Adult Congenital and CV Genetics Clinic Routine 4/9/2019 11/9/2019 11/9/2018    Echo Congenital Adult Routine 4/9/2019 11/10/2019 11/9/2018            Who to contact     If you have questions or need follow up information about today's clinic visit or your schedule please contact [facility] directly at [phone].  Normal or non-critical lab and imaging results will be communicated to you by [employer], letter or phone within 4 business days after the clinic has received the results. If you do not hear from us within 7 days, please contact the clinic through [employer] or phone. If you have a critical or abnormal lab result, we will notify you by phone as soon as possible.  Submit refill requests through [employer] or call your pharmacy and they will forward the refill request to us. Please allow 3 business days for your refill to be completed.          Additional Information About Your Visit        [employer] Information     [employer] gives you secure access to your electronic health record. If you see a primary care provider, you can also send messages to your care team and make appointments. If you have questions, please call your primary care clinic.  If you do not have a primary care provider, please call [phone] and they will assist you.        Care EveryWhere ID     This is your Care EveryWhere ID. This could be used by other organizations to access your [place] medical records  [id number]        Your Vitals Were     Pulse Height Pulse Oximetry BMI (Body Mass Index)          73 1.676 m (5' 6\") 100% 24.86 kg/m2         Blood Pressure from " Last 3 Encounters:   11/09/18 130/79   04/27/18 101/65   04/09/18 132/75    Weight from Last 3 Encounters:   11/09/18 69.9 kg (154 lb)   04/27/18 71 kg (156 lb 8 oz)   04/07/18 71.1 kg (156 lb 12 oz)              We Performed the Following     Follow-Up with Cardiologist          Today's Medication Changes          These changes are accurate as of 11/9/18  3:55 PM.  If you have any questions, ask your nurse or doctor.               Start taking these medicines.        Dose/Directions    amoxicillin 500 MG capsule   Commonly known as:  AMOXIL   Used for:  S/P pulmonary valve replacement   Started by:  Madhu Mccollum MD        2,000 mg (4 tablets) 30-60 min prior to dental work   Quantity:  4 capsule   Refills:  3            Where to get your medicines      These medications were sent to [place] [address] [place] [id number]    Hours:  9-7 M-F, 9-6 Sat, 11-3 Sun Phone:  [phone]     amoxicillin 500 MG capsule                Primary Care Provider Office Phone # Fax #    Nora Elias, [place] [phone]       [address] [address]        Equal Access to Services     GIL BALL AH: Hadii ishaan vang hadasho Soomaali, waaxda luqadaha, qaybta kaalmada adeegyada, marsha cho. So [facility] [phone].    ATENCIÓN: Si habla español, tiene a burgos disposición servicios gratuitos de asistencia lingüística. LlMount [facility] System [phone].    We comply with applicable federal civil rights laws and [place] laws. We do not discriminate on the basis of race, color, national origin, age, disability, sex, sexual orientation, or gender identity.            Thank you!     Thank you for choosing [facility]  for your care. Our goal is always to provide you with excellent care. Hearing back from our patients is one way we can continue to improve our services. Please take a few minutes to complete the written survey  that you may receive in the mail after your visit with us. Thank you!             Your Updated Medication List - Protect others around you: Learn how to safely use, store and throw away your medicines at www.disposemymeds.org.          This list is accurate as of 11/9/18  3:55 PM.  Always use your most recent med list.                   Brand Name Dispense Instructions for use Diagnosis    amoxicillin 500 MG capsule    AMOXIL    4 capsule    2,000 mg (4 tablets) 30-60 min prior to dental work    S/P pulmonary valve replacement       aspirin 81 MG tablet     90 tablet    Take 1 tablet (81 mg) by mouth daily    Status post pulmonary valve replacement       cyanocolbalamin 100 MCG tablet    vitamin  B-12     Take 100 mcg by mouth daily        ferrous sulfate 325 (65 Fe) MG tablet    IRON     Take 325 mg by mouth every other day        labetalol 300 MG tablet    NORMODYNE    60 tablet    Take 1 tablet (300 mg) by mouth 2 times daily    Essential hypertension       vitamin B complex with vitamin C Tabs tablet      Take 1 tablet by mouth daily

## 2018-11-10 NOTE — PROGRESS NOTES
Service Date: 11/09/2018      HISTORY OF PRESENT ILLNESS:  Ms. Kelley is a very pleasant 58-year-old woman whose past medical history is significant for congenital pulmonary stenosis, atrial septal defect and PDA, undergoing pulmonary valvuloplasty in 1962, 1964 and 1983 through a median sternotomy.  She also has a history of patent ductus arteriosus that was ligated in 1968 and atrial septal defect that was closed in 1983.  Heydi underwent a Jacqueline valve with LPA stent on 04/06/2018.  She had a 22 mm Jacqueline valve placed and placement of an LPA stent at that time.  Her postoperative course was complicated by a small dissection of a branch of the left lower pulmonary artery, but she did well with no subsequent issues.  Her CPX was performed today and compared to that from before surgery, and she improved going 65% compared to 62% of predicted.  Her RER was actually less, and there was an overall improvement, which I was glad to see.  She is continuing to exercise regularly.  In fact, this summer she did a couple of 5Ks and note that she came in just behind the 80-year-old first place winner, which was [employer].  Her echo is unchanged.  Her valve is doing well.  She has not had any concerning symptoms.      IMPRESSION, REPORT, PLAN:   1.  Congenital pulmonary valve stenosis, status post pulmonary valvuloplasty in 1962, 1964 and 1983 through a median sternotomy with severe pulmonary insufficiency and normal right ventricular chamber size and function.   2.  Atrial arrhythmias, initial event 08/2016 with atrial fibrillation/flutter, subsequent event 02/2017, now on Xarelto and labetalol.   3.  History of PDA, status post ligation in 1968 through a lateral thoracotomy at the [facility].   4.  ASD closure in 1983 at the [facility].   5.  Hypertension, controlled.   6.  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   7.  Severe pulmonary insufficiency, status post Jacqueline  valve 2018 with a 22 mm valve.   8.  Small branch dissection of the left lower pulmonary artery at the time of the Jacqueline valve, resolved.   9.  Normal coronary arteries at time of cath.   10.  Normal cholesterol.      DISCUSSION:  It was a pleasure to see Ms. Rapp in followup.  Clinically, she is doing well from a cardiovascular standpoint.  Her CPX shows improvement.  Her echo is stable and unchanged.  She has not had any concerning systemic symptoms.  She is remaining active and exercising.  She was wondering whether she needs a coronary calcium scan.  Her coronary arteries at the time of her Jacqueline valve placement were completely normal without any evidence of stenosis, and so we do not need to do that.  We will continue otherwise her medications as prescribed.  She knows to take antibiotics before dental work.  We will plan to see her back in 6 months with an echo or sooner if there are any issues in the interim.  It was a pleasure to see her.  Please do not hesitate to contact me with any questions or concerns.         JAY SAUCEDO MD             D: 2018   T: 2018   MT: ZANE      Name:     EDWIN RAPP   MRN:      [id number]        Account:      [id number]   :      1960           Service Date: 2018      Document: [id number]

## 2019-04-12 ENCOUNTER — ANCILLARY PROCEDURE (OUTPATIENT)
Dept: CARDIOLOGY | Facility: CLINIC | Age: 59
End: 2019-04-12
Attending: INTERNAL MEDICINE
Payer: COMMERCIAL

## 2019-04-12 VITALS
SYSTOLIC BLOOD PRESSURE: 120 MMHG | DIASTOLIC BLOOD PRESSURE: 81 MMHG | HEIGHT: 66 IN | OXYGEN SATURATION: 99 % | BODY MASS INDEX: 25.09 KG/M2 | WEIGHT: 156.1 LBS | HEART RATE: 65 BPM

## 2019-04-12 DIAGNOSIS — Z95.2 S/P PULMONARY VALVE REPLACEMENT: ICD-10-CM

## 2019-04-12 DIAGNOSIS — J98.4 PULMONARY INSUFFICIENCY: ICD-10-CM

## 2019-04-12 PROCEDURE — G0463 HOSPITAL OUTPT CLINIC VISIT: HCPCS | Mod: 25,ZF

## 2019-04-12 PROCEDURE — 99213 OFFICE O/P EST LOW 20 MIN: CPT | Mod: ZP | Performed by: INTERNAL MEDICINE

## 2019-04-12 ASSESSMENT — ENCOUNTER SYMPTOMS
LEG PAIN: 0
EXERCISE INTOLERANCE: 0
NAIL CHANGES: 0
SLEEP DISTURBANCES DUE TO BREATHING: 0
LIGHT-HEADEDNESS: 0
SKIN CHANGES: 1
ORTHOPNEA: 0
SYNCOPE: 0
POOR WOUND HEALING: 0
PALPITATIONS: 1

## 2019-04-12 ASSESSMENT — PAIN SCALES - GENERAL: PAINLEVEL: NO PAIN (0)

## 2019-04-12 ASSESSMENT — MIFFLIN-ST. JEOR: SCORE: 1314.81

## 2019-04-12 NOTE — NURSING NOTE
Cardiac Testing: Patient given instructions regarding  echocardiogram . Discussed purpose, preparation, procedure and when to expect results reported back to the patient. Patient demonstrated understanding of this information and agreed to call with further questions or concerns.    Med Reconcile: Reviewed and verified all current medications with the patient. The updated medication list was printed and given to the patient.    Return Appointment: Follow up with Dr Mccollum in one year with an echo. Patient given instructions regarding scheduling next clinic visit. Patient demonstrated understanding of this information and agreed to call with further questions or concerns.    Patient stated she understood all health information given and agreed to call with further questions or concerns.    Tru Peterson, RN  RN Care Coordinator  [facility]  [phone]

## 2019-04-12 NOTE — PROGRESS NOTES
HPI:     Ms. Kelley is a very pleasant 58-year-old woman whose past medical history is significant for congenital pulmonary stenosis, atrial septal defect and PDA, undergoing pulmonary valvuloplasty in 1962, 1964 and 1983 through a median sternotomy.  She also has a history of patent ductus arteriosus that was ligated in 1968 and atrial septal defect that was closed in 1983.  Heydi underwent a Jacqueline valve with LPA stent on 04/06/2018.  She had a 22 mm Jacqueline valve placed and placement of an LPA stent at that time. Her postoperative course was complicated by a small dissection of a branch of the left lower pulmonary artery, but she did well with no subsequent issues.     I last saw her in November of 2018 and her CPX compared to that from before surgery she improved going 65% compared to 62% of predicted.  Her RER was actually less, and there was an overall improvement.       She is continuing to exercise regularly.  In fact, this past summer she did a couple of 5Ks.  She definitively feels better.  Her echo is unchanged.  Her valve is doing well.  She has not had any concerning symptoms.         PAST MEDICAL HISTORY:  Past Medical History:   Diagnosis Date     Atrial flutter (H)      Hyperlipidemia      Hypertension      S/P atrial septal defect closure      S/P PDA repair      S/P pulmonary valvulotomy        CURRENT MEDICATIONS:  Current Outpatient Medications   Medication Sig Dispense Refill     amoxicillin (AMOXIL) 500 MG capsule 2,000 mg (4 tablets) 30-60 min prior to dental work 4 capsule 3     aspirin 81 MG tablet Take 1 tablet (81 mg) by mouth daily 90 tablet 3     labetalol (NORMODYNE) 300 MG tablet Take 1 tablet (300 mg) by mouth 2 times daily 60 tablet 3     cyanocolbalamin (VITAMIN  B-12) 100 MCG tablet Take 100 mcg by mouth daily       ferrous sulfate (IRON) 325 (65 FE) MG tablet Take 325 mg by mouth every other day       vitamin B complex with vitamin C (VITAMIN  B COMPLEX) TABS tablet Take 1 tablet  by mouth daily         PAST SURGICAL HISTORY:  Past Surgical History:   Procedure Laterality Date     pulmonary valvulotomy         ALLERGIES   No Known Allergies    FAMILY HISTORY:  No family history on file.    SOCIAL HISTORY:  Social History     Socioeconomic History     Marital status:      Spouse name: None     Number of children: None     Years of education: None     Highest education level: None   Occupational History     None   Social Needs     Financial resource strain: None     Food insecurity:     Worry: None     Inability: None     Transportation needs:     Medical: None     Non-medical: None   Tobacco Use     Smoking status: Never Smoker     Smokeless tobacco: Never Used   Substance and Sexual Activity     Alcohol use: No     Drug use: No     Sexual activity: None   Lifestyle     Physical activity:     Days per week: None     Minutes per session: None     Stress: None   Relationships     Social connections:     Talks on phone: None     Gets together: None     Attends Druze service: None     Active member of club or organization: None     Attends meetings of clubs or organizations: None     Relationship status: None     Intimate partner violence:     Fear of current or ex partner: None     Emotionally abused: None     Physically abused: None     Forced sexual activity: None   Other Topics Concern     None   Social History Narrative     None       ROS:   Constitutional: No fever, chills, or sweats. No weight gain/loss   ENT: No visual disturbance, ear ache, epistaxis, sore throat  Allergies/Immunologic: Negative.   Respiratory: No cough, hemoptysia  Cardiovascular: As per HPI  GI: No nausea, vomiting, hematemesis, melena, or hematochezia  : No urinary frequency, dysuria, or hematuria  Integument: Negative  Psychiatric: Negative  Neuro: Negative  Endocrinology: Negative   Musculoskeletal: Negative    EXAM:  /85 (BP Location: Right arm, Patient Position: Chair, Cuff Size: Adult Large)    "Pulse 65   Ht 1.676 m (5' 6\")   Wt 70.8 kg (156 lb 1.6 oz)   SpO2 99%   BMI 25.20 kg/m    In general, the patient is a pleasant female in no apparent distress.    HEENT: NC/AT.  PERRLA.  EOMI.  Sclerae white, not injected.  Nares clear.  Pharynx without erythema or exudate.  Dentition intact.    Neck:  Carotids +4/4 bilaterally without bruits.  No jugular venous distension.   Heart: RRR. Normal S1, S2 splits physiologically and crisp.   Lungs: CTA.  No ronchi, wheezes, rales.   Abdomen: Soft, nontender  Extremities: No clubbing, cyanosis, or edema.    Neurologic: Alert and oriented to person/place/time, normal speech, gait and affect  Skin: No petechiae, purpura or rash.    Labs:  LIPID RESULTS:  Lab Results   Component Value Date    CHOL 126 04/06/2018    HDL 53 04/06/2018    LDL 51 04/06/2018    TRIG 79 04/07/2018    NHDL 73 04/06/2018       LIVER ENZYME RESULTS:  Lab Results   Component Value Date    AST 17 04/27/2018    ALT 17 04/27/2018       CBC RESULTS:  Lab Results   Component Value Date    WBC 3.4 (L) 04/27/2018    RBC 3.71 (L) 04/27/2018    HGB 11.8 04/27/2018    HCT 35.8 04/27/2018    MCV 97 04/27/2018    MCH 31.8 04/27/2018    MCHC 33.0 04/27/2018    RDW 12.9 04/27/2018     (L) 04/27/2018       BMP RESULTS:  Lab Results   Component Value Date     04/27/2018    POTASSIUM 4.2 04/27/2018    CHLORIDE 104 04/27/2018    CO2 28 04/27/2018    ANIONGAP 7 04/27/2018    GLC 88 04/27/2018    BUN 10 04/27/2018    CR 0.87 04/27/2018    GFRESTIMATED 67 04/27/2018    GFRESTBLACK 81 04/27/2018    KEMAR 8.9 04/27/2018        A1C RESULTS:  No results found for: A1C    INR RESULTS:  Lab Results   Component Value Date    INR 1.1 02/09/2017       IMPRESSION, REPORT, PLAN:   1.  Congenital pulmonary valve stenosis, status post pulmonary valvuloplasty in 1962, 1964 and 1983 through a median sternotomy with severe pulmonary insufficiency and normal right ventricular chamber size and function.   2.  Atrial " arrhythmias, initial event 08/2016 with atrial fibrillation/flutter, subsequent event 02/2017, now on Xarelto and labetalol.   3.  History of PDA, status post ligation in 1968 through a lateral thoracotomy at the [facility].   4.  ASD closure in 1983 at the [facility].   5.  Hypertension, controlled.   6.  LPA stenosis, status post LPA stent 04/06/2018 [facility].   7.  Severe pulmonary insufficiency, status post Jacqueline valve 04/06/2018 with a 22 mm valve.   8.  Small branch dissection of the left lower pulmonary artery at the time of the Jacqueline valve, resolved.   9.  Normal coronary arteries at time of cath.   10.  Normal cholesterol.     It was a pleasure to see Ms. Kelley in followup.  Clinically, she is doing well from a cardiovascular standpoint.  Her CPX shows improvement.  Her echo is stable and unchanged.  She has not had any concerning systemic symptoms.  She is remaining active and exercising    She knows to take antibiotics before dental work.  We will plan to see her back in 1 year with an echo or sooner if there are any issues in the interim.  It was a pleasure to see her.  Please do not hesitate to contact me with any questions or concerns.      JOSUE Mccollum MD     CC  Patient Care Team:  Nora Elias NP as PCP - General  Tru Peterson, RN as Nurse Coordinator (Cardiology)  Madhu Mccollum MD as MD (Cardiology)  SELF, REFERRED

## 2019-04-12 NOTE — NURSING NOTE
Chief Complaint   Patient presents with     Follow Up     58 year old female with history of ASD s/p closure, PDA s/p ligation, A flutter/ Afib, hypertension, hyperlipidemia, and pulmonary stenosis s/p pulmonary valvotomy x 3, s/p LPA stenting and Jacqueline valve placement 4/2018 presenting for follow up     Vitals were taken and medications were reconciled.     Jennifer Alvarez MA    11:43 AM

## 2019-04-12 NOTE — PATIENT INSTRUCTIONS
"You were seen today in the Adult Congenital and Cardiovascular Genetics Clinic at the [facility].    Cardiology Providers you saw during your visit:  Dr Olivia Mccollum    Diagnosis:  History of pulmonary valve replacement    Results:  Dr Mccollum reviewed the results of your echo with you in clinic today    Recommendations:    1.  Continue to eat a heart healthy, low salt diet.  2.  Continue to get 20-30 minutes of aerobic activity, 4-5 days per week.  Examples of aerobic activity include walking, running, swimming, cycling, etc.  3.  Continue to observe good oral hygiene, with regular dental visits.         Vitals:    04/12/19 1140 04/12/19 1144   BP: 152/88 142/85   BP Location: Right arm Right arm   Patient Position: Chair Chair   Cuff Size: Adult Regular Adult Large   Pulse: 65    SpO2: 99%    Weight: 70.8 kg (156 lb 1.6 oz)    Height: 1.676 m (5' 6\")        SBE prophylaxis:   Yes__x__  No____    Lifelong Bacterial Endocarditis Prophylaxis:  YES____  NO____    If YES is checked, follow the recommendations outlined below:   1. Take antibiotic(s) prior to recommended dental procedures and procedures on the respiratory tract or with infected skin, muscle or bones. SBE prophylaxis is not needed for routine GI and  procedures (ie. Colonoscopy or vaginal delivery)   2. Observe good oral hygiene daily, as advised by your dentist. Get regular professional dental care.   3. Keep cuts clean.   4. Infections should be treated promptly.   5. Symptoms of Infective Endocarditis could include: fever lasting more than 4-5 days or a recurrent fever that initially resolves but returns within 1-2 days)     Exercise restrictions:   Yes____  No__x__         If yes, list restrictions:  Must be allowed to rest if fatigued or SOB      Work restrictions:  Yes____  No_x___         If yes, list restrictions:    FASTING CHOLESTEROL was checked in the last 5 years YES_x__  NO___  2017  Continue to eat a heart healthy, low salt " diet.         ____ Fasting lipid panel order today         ____ No changes in medications          ____ I recommend the following changes in your cholesterol medications.:          ____ Please follow up for cholesterol screening at your primary care physician      Follow-up:  Follow up with Dr Mccollum in 1 year with an echocardiogram.     For after hours urgent needs, call [phone] and ask to speak to the Adult Congenital Physician on call.  Mention Job Code 0401.    For emergencies call [phone].    For any scheduling needs, please call [facility] Procedure , at [phone]  Thank you for your visit today!  If you have questions or concerns about today's visit, please call me.    Tru Peterson, RN, BSN  Cardiology Care Coordinator  [facility]  [phone]    [address] [facility]  Mail Code [id number]  [place] [address]

## 2019-04-12 NOTE — LETTER
4/12/2019      RE: Heydi Kelley  [address] [facility] [address]       Dear Colleague,    Thank you for the opportunity to participate in the care of your patient, Heydi Kelley, at the [facility]. Please see a copy of my visit note below.    HPI:     Please see dictated note    PAST MEDICAL HISTORY:  Past Medical History:   Diagnosis Date     Atrial flutter (H)      Hyperlipidemia      Hypertension      S/P atrial septal defect closure      S/P PDA repair      S/P pulmonary valvulotomy        CURRENT MEDICATIONS:  Current Outpatient Medications   Medication Sig Dispense Refill     amoxicillin (AMOXIL) 500 MG capsule 2,000 mg (4 tablets) 30-60 min prior to dental work 4 capsule 3     aspirin 81 MG tablet Take 1 tablet (81 mg) by mouth daily 90 tablet 3     labetalol (NORMODYNE) 300 MG tablet Take 1 tablet (300 mg) by mouth 2 times daily 60 tablet 3     cyanocolbalamin (VITAMIN  B-12) 100 MCG tablet Take 100 mcg by mouth daily       ferrous sulfate (IRON) 325 (65 FE) MG tablet Take 325 mg by mouth every other day       vitamin B complex with vitamin C (VITAMIN  B COMPLEX) TABS tablet Take 1 tablet by mouth daily         PAST SURGICAL HISTORY:  Past Surgical History:   Procedure Laterality Date     pulmonary valvulotomy         ALLERGIES   No Known Allergies    FAMILY HISTORY:  No family history on file.    SOCIAL HISTORY:  Social History     Socioeconomic History     Marital status:      Spouse name: None     Number of children: None     Years of education: None     Highest education level: None   Occupational History     None   Social Needs     Financial resource strain: None     Food insecurity:     Worry: None     Inability: None     Transportation needs:     Medical: None     Non-medical: None   Tobacco Use     Smoking status: Never Smoker     Smokeless tobacco: Never Used   Substance and Sexual Activity     Alcohol use: No     Drug  "use: No     Sexual activity: None   Lifestyle     Physical activity:     Days per week: None     Minutes per session: None     Stress: None   Relationships     Social connections:     Talks on phone: None     Gets together: None     Attends Jew service: None     Active member of club or organization: None     Attends meetings of clubs or organizations: None     Relationship status: None     Intimate partner violence:     Fear of current or ex partner: None     Emotionally abused: None     Physically abused: None     Forced sexual activity: None   Other Topics Concern     None   Social History Narrative     None       ROS:   Constitutional: No fever, chills, or sweats. No weight gain/loss   ENT: No visual disturbance, ear ache, epistaxis, sore throat  Allergies/Immunologic: Negative.   Respiratory: No cough, hemoptysia  Cardiovascular: As per HPI  GI: No nausea, vomiting, hematemesis, melena, or hematochezia  : No urinary frequency, dysuria, or hematuria  Integument: Negative  Psychiatric: Negative  Neuro: Negative  Endocrinology: Negative   Musculoskeletal: Negative    EXAM:  /85 (BP Location: Right arm, Patient Position: Chair, Cuff Size: Adult Large)   Pulse 65   Ht 1.676 m (5' 6\")   Wt 70.8 kg (156 lb 1.6 oz)   SpO2 99%   BMI 25.20 kg/m     In general, the patient is a pleasant female in no apparent distress.    HEENT: NC/AT.  PERRLA.  EOMI.  Sclerae white, not injected.  Nares clear.  Pharynx without erythema or exudate.  Dentition intact.    Neck:  Carotids +4/4 bilaterally without bruits.  No jugular venous distension.   Heart: RRR. Normal S1, S2 splits physiologically and crisp.   Lungs: CTA.  No ronchi, wheezes, rales.   Abdomen: Soft, nontender  Extremities: No clubbing, cyanosis, or edema.    Neurologic: Alert and oriented to person/place/time, normal speech, gait and affect  Skin: No petechiae, purpura or rash.    Labs:  LIPID RESULTS:  Lab Results   Component Value Date    CHOL 126 " 04/06/2018    HDL 53 04/06/2018    LDL 51 04/06/2018    TRIG 79 04/07/2018    NHDL 73 04/06/2018       LIVER ENZYME RESULTS:  Lab Results   Component Value Date    AST 17 04/27/2018    ALT 17 04/27/2018       CBC RESULTS:  Lab Results   Component Value Date    WBC 3.4 (L) 04/27/2018    RBC 3.71 (L) 04/27/2018    HGB 11.8 04/27/2018    HCT 35.8 04/27/2018    MCV 97 04/27/2018    MCH 31.8 04/27/2018    MCHC 33.0 04/27/2018    RDW 12.9 04/27/2018     (L) 04/27/2018       BMP RESULTS:  Lab Results   Component Value Date     04/27/2018    POTASSIUM 4.2 04/27/2018    CHLORIDE 104 04/27/2018    CO2 28 04/27/2018    ANIONGAP 7 04/27/2018    GLC 88 04/27/2018    BUN 10 04/27/2018    CR 0.87 04/27/2018    GFRESTIMATED 67 04/27/2018    GFRESTBLACK 81 04/27/2018    KEMAR 8.9 04/27/2018        A1C RESULTS:  No results found for: A1C    INR RESULTS:  Lab Results   Component Value Date    INR 1.1 02/09/2017       JOSUE Mccollum MD     CC  Patient Care Team:  Nora Elias NP as PCP - General  Tru Peterson, RN as Nurse Coordinator (Cardiology)  Madhu Mccollum MD as MD (Cardiology)  SELF, REFERRED

## 2019-10-05 ENCOUNTER — HEALTH MAINTENANCE LETTER (OUTPATIENT)
Age: 59
End: 2019-10-05

## 2020-02-10 ENCOUNTER — CARE COORDINATION (OUTPATIENT)
Dept: CARDIOLOGY | Facility: CLINIC | Age: 60
End: 2020-02-10

## 2020-02-10 DIAGNOSIS — R00.2 PALPITATIONS: Primary | ICD-10-CM

## 2020-02-10 NOTE — PROGRESS NOTES
Date: 2/10/2020    Time of Call: 3:48 PM     Diagnosis:  Palpitations     [ TORB ] Ordering provider: Dr Olivia Mccollum  Order: 14 Day Zio monitor prior to follow up appointment     Order received by: Tru Peterson RN     Follow-up/additional notes: Patient sent My Chart message with recommendations from Dr Mccollum.

## 2020-02-17 ENCOUNTER — ALLIED HEALTH/NURSE VISIT (OUTPATIENT)
Dept: CARDIOLOGY | Facility: CLINIC | Age: 60
End: 2020-02-17
Attending: INTERNAL MEDICINE
Payer: COMMERCIAL

## 2020-02-17 DIAGNOSIS — R00.2 PALPITATIONS: ICD-10-CM

## 2020-02-17 PROCEDURE — 0296T ZIO PATCH HOLTER ADULT PEDIATRIC GREATER THAN 48 HRS: CPT | Mod: ZF

## 2020-02-17 NOTE — PROGRESS NOTES
Fide Mailed Out to Patient    no back pain, no gout, no musculoskeletal pain, no neck pain, and no weakness.

## 2020-02-24 ENCOUNTER — TRANSFERRED RECORDS (OUTPATIENT)
Dept: HEALTH INFORMATION MANAGEMENT | Facility: CLINIC | Age: 60
End: 2020-02-24

## 2020-02-26 ENCOUNTER — MEDICAL CORRESPONDENCE (OUTPATIENT)
Dept: HEALTH INFORMATION MANAGEMENT | Facility: CLINIC | Age: 60
End: 2020-02-26

## 2020-02-26 ENCOUNTER — TRANSFERRED RECORDS (OUTPATIENT)
Dept: HEALTH INFORMATION MANAGEMENT | Facility: CLINIC | Age: 60
End: 2020-02-26

## 2020-04-10 ENCOUNTER — VIRTUAL VISIT (OUTPATIENT)
Dept: CARDIOLOGY | Facility: CLINIC | Age: 60
End: 2020-04-10
Attending: INTERNAL MEDICINE
Payer: COMMERCIAL

## 2020-04-10 DIAGNOSIS — J98.4 PULMONARY INSUFFICIENCY: ICD-10-CM

## 2020-04-10 DIAGNOSIS — Z95.2 S/P PULMONARY VALVE REPLACEMENT: ICD-10-CM

## 2020-04-10 PROCEDURE — 99214 OFFICE O/P EST MOD 30 MIN: CPT | Mod: 95 | Performed by: INTERNAL MEDICINE

## 2020-04-10 RX ORDER — FAMOTIDINE 20 MG
TABLET ORAL
COMMUNITY
End: 2024-09-03

## 2020-04-10 NOTE — PATIENT INSTRUCTIONS
You were seen today in the Adult Congenital and Cardiovascular Genetics Clinic at the [facility].    Cardiology Providers you saw during your visit:  Dr Olivia Mccollum    Diagnosis:  History of pulmonary valvulotomy     Results:  Dr Mccollum reviewed the results of your Zio monitor with you in clinic    Recommendations:    1.  Continue to eat a heart healthy, low salt diet.  2.  Continue to get 20-30 minutes of aerobic activity, 4-5 days per week.  Examples of aerobic activity include walking, running, swimming, cycling, etc.  3.  Continue to observe good oral hygiene, with regular dental visits.  4.  We will schedule an echo as soon at it is deemed safe to do so.    SBE prophylaxis:   Yes_x___  No____    Lifelong Bacterial Endocarditis Prophylaxis:  YES____  NO____    If YES is checked, follow the recommendations outlined below:   1. Take antibiotic(s) prior to recommended dental procedures and procedures on the respiratory tract or with infected skin, muscle or bones. SBE prophylaxis is not needed for routine GI and  procedures (ie. Colonoscopy or vaginal delivery)   2. Observe good oral hygiene daily, as advised by your dentist. Get regular professional dental care.   3. Keep cuts clean.   4. Infections should be treated promptly.   5. Symptoms of Infective Endocarditis could include: fever lasting more than 4-5 days or a recurrent fever that initially resolves but returns within 1-2 days)     Exercise restrictions:   Yes____  No_x___         If yes, list restrictions:  Must be allowed to rest if fatigued or SOB      Work restrictions:  Yes____  No__x__         If yes, list restrictions:    FASTING CHOLESTEROL was checked in the last 5 years YES_x__  NO___ 2017   Continue to eat a heart healthy, low salt diet.         ____ Fasting lipid panel order today         ____ No changes in medications          ____ I recommend the following changes in your cholesterol medications.:          ____ Please follow up  for cholesterol screening at your primary care physician      Follow-up:  Follow up with Dr Mccollum in one year with an echo      For after hours urgent needs, call [phone] and ask to speak to the Adult Congenital Physician on call.  Mention Job Code 0401.    For emergencies call [phone].    For any scheduling needs, please call Jennifer Alvarez Procedure , at [phone]  Thank you for your visit today!  If you have questions or concerns about today's visit, please call me.    Tru Peterson RN, BSN  Cardiology Care Coordinator  [facility]  [phone]    [address] [place] [address]  Mail Code [id number]  [place] [address]

## 2020-04-10 NOTE — NURSING NOTE
Chief Complaint   Patient presents with     Follow Up     follow up     Allergies and medications reconciled.     Yevgeniy Geller CMA  11:30 AM

## 2020-04-10 NOTE — PROGRESS NOTES
"Heydi Kelley is a 59 year old female who is being evaluated via a billable video visit.      The patient has been notified of following:     \"This video visit will be conducted via a call between you and your physician/provider. We have found that certain health care needs can be provided without the need for an in-person physical exam.  This service lets us provide the care you need with a video conversation.  If a prescription is necessary we can send it directly to your pharmacy.  If lab work is needed we can place an order for that and you can then stop by our lab to have the test done at a later time.    Video visits are billed at different rates depending on your insurance coverage.  Please reach out to your insurance provider with any questions.    If during the course of the call the physician/provider feels a video visit is not appropriate, you will not be charged for this service.\"    Patient has given verbal consent for Video visit? Yes    How would you like to obtain your AVS? Mckaylahart    Patient would like the video invitation sent by: Send to e-mail at: [email or web address]       Video duration 26 minutes     Heydi Kelley complains of    Chief Complaint   Patient presents with     Follow Up       I have reviewed and updated the patient's Past Medical History, Social History, Family History and Medication List.    ALLERGIES  Patient has no known allergies.      Video-Visit Details    Type of service:  Video Visit    Originating Location (pt. Location): Home  Distant Location (provider location):  [facility]     Mode of Communication:  Video Conference via [facility]      CARDIOLOGY CONSULTATION:    Ms. Kelley is a very pleasant 59-year-old woman whose past medical history is significant for congenital pulmonary stenosis, atrial septal defect and PDA, undergoing pulmonary valvuloplasty in 1962, 1964 and 1983 through a median sternotomy.  She also has a history of patent ductus arteriosus " that was ligated in 1968 and atrial septal defect that was closed in 1983.  Heydi underwent a Jacqueline valve with LPA stent on 04/06/2018.  She had a 22 mm Jacqueline valve placed and placement of an LPA stent at that time. Her postoperative course was complicated by a small dissection of a branch of the left lower pulmonary artery, but she did well with no subsequent issues.      I last saw her 4/2019 and her CPX compared to that from before surgery she improved going 65% compared to 62% of predicted.  Her RER was actually less so there was an overall improvement.        She is continuing to exercise regularly.   She has not had any concerning symptoms other than palpitations for which we did a 12 day zip patch in February and she had a few runs of SVT up to 15 seconds during that duration with which she was symptomatic. But she reports that those are better now.  HDL 65,  on recent check at her primary, and she is not on a statin and had no CAD on cath 2 years ago, so that is excellent.  She takes abx before the dentist.  She will get an echo when covid restrictions are lifted and we can call with that.  BP has been good.  Weight up 4 pounds compared to last year (156-160).  She continues to work from home as she has always done.       PAST MEDICAL HISTORY:  Past Medical History:   Diagnosis Date     Atrial flutter (H)      Hyperlipidemia      Hypertension      S/P atrial septal defect closure      S/P PDA repair      S/P pulmonary valvulotomy        CURRENT MEDICATIONS:  Current Outpatient Medications   Medication Sig Dispense Refill     amoxicillin (AMOXIL) 500 MG capsule 2,000 mg (4 tablets) 30-60 min prior to dental work 4 capsule 3     aspirin 81 MG tablet Take 1 tablet (81 mg) by mouth daily 90 tablet 3     cyanocolbalamin (VITAMIN  B-12) 100 MCG tablet Take 100 mcg by mouth daily       ferrous sulfate (IRON) 325 (65 FE) MG tablet Take 325 mg by mouth every other day       labetalol (NORMODYNE) 300 MG  tablet Take 1 tablet (300 mg) by mouth 2 times daily 60 tablet 3     Vitamin D, Cholecalciferol, 25 MCG (1000 UT) CAPS Pt taking BID         PAST SURGICAL HISTORY:  Past Surgical History:   Procedure Laterality Date     pulmonary valvulotomy         ALLERGIES  Patient has no known allergies.    FAMILY HX:  No family history on file.    SOCIAL HX:  Social History     Socioeconomic History     Marital status:      Spouse name: Not on file     Number of children: Not on file     Years of education: Not on file     Highest education level: Not on file   Occupational History     Not on file   Social Needs     Financial resource strain: Not on file     Food insecurity     Worry: Not on file     Inability: Not on file     Transportation needs     Medical: Not on file     Non-medical: Not on file   Tobacco Use     Smoking status: Never Smoker     Smokeless tobacco: Never Used   Substance and Sexual Activity     Alcohol use: No     Drug use: No     Sexual activity: Not on file   Lifestyle     Physical activity     Days per week: Not on file     Minutes per session: Not on file     Stress: Not on file   Relationships     Social connections     Talks on phone: Not on file     Gets together: Not on file     Attends Yazidi service: Not on file     Active member of club or organization: Not on file     Attends meetings of clubs or organizations: Not on file     Relationship status: Not on file     Intimate partner violence     Fear of current or ex partner: Not on file     Emotionally abused: Not on file     Physically abused: Not on file     Forced sexual activity: Not on file   Other Topics Concern     Not on file   Social History Narrative     Not on file       ROS:  Constitutional: No fever, chills, or sweats. No weight gain/loss.   ENT: No visual disturbance, ear ache, epistaxis, sore throat.   Allergies/Immunologic: Negative.   Respiratory: No cough, hemoptysis.   Cardiovascular: As per HPI.   GI: No nausea,  vomiting, hematemesis, melena, or hematochezia.   : No urinary frequency, dysuria, or hematuria.   Integument: Negative.   Psychiatric: Negative.   Neuro: Negative.   Endocrinology: Negative.   Musculoskeletal: No myalgia.    VITAL SIGNS:  There were no vitals taken for this visit.  There is no height or weight on file to calculate BMI.  Wt Readings from Last 2 Encounters:   04/12/19 70.8 kg (156 lb 1.6 oz)   11/09/18 69.9 kg (154 lb)       PHYSICAL EXAM  Heydi Kelley IS A 59 year old female.in no acute distress.  General Appearance:    no distress, normal body habitus, upright.    ENT/Mouth:    membranes moist, no nasal discharge or bleeding gums. Normal head shape, no evidence of injury or laceration.    EYES:    no scleral icterus, normal conjunctivae    Neck:    no evidence of thyromegaly. Supple    Chest/Lungs:    No audible wheezing equal chest wall expansion. Non labored breathing. No cough.    Cardiovascular:    No evidence of elevated jugular venous pressure. No evidence of pitting edema bilaterally    Abdomen:    no evidence of abdominal distention. No observed jaundice.    Extremities:    no cyanosis or clubbing noted.    Skin:    no xanthelasma, normal skin collar. No evidence of facial lacerations.    Neurologic:    Normal arm motion bilateral, no tremors. No evidence of focal defect.    Psychiatric:    alert and oriented x3, calm      LABS    Lab Results   Component Value Date    WBC 3.4 04/27/2018     Lab Results   Component Value Date    RBC 3.71 04/27/2018     Lab Results   Component Value Date    HGB 11.8 04/27/2018     Lab Results   Component Value Date    HCT 35.8 04/27/2018     No components found for: MCT  Lab Results   Component Value Date    MCV 97 04/27/2018     Lab Results   Component Value Date    MCH 31.8 04/27/2018     Lab Results   Component Value Date    MCHC 33.0 04/27/2018     Lab Results   Component Value Date    RDW 12.9 04/27/2018     Lab Results   Component Value Date    PLT  140 04/27/2018      Recent Labs   Lab Test 04/27/18  1232 04/09/18  0045    138   POTASSIUM 4.2 3.9   CHLORIDE 104 107   CO2 28 26   ANIONGAP 7 6   GLC 88 96   BUN 10 9   CR 0.87 0.82   KEMAR 8.9 8.4*     Recent Labs   Lab Test 04/07/18  0308 04/06/18  1900 10/13/17  1041   CHOL  --  126 178   HDL  --  53 71   LDL  --  51 90   TRIG 79 110 81   NHDL  --  73 106      IMPRESSION, REPORT, PLAN:   1.  Congenital pulmonary valve stenosis, status post pulmonary valvuloplasty in 1962, 1964 and 1983 through a median sternotomy with severe pulmonary insufficiency and normal right ventricular chamber size and function.   2.  Atrial arrhythmias, initial event 08/2016 with atrial fibrillation/flutter, subsequent event 02/2017, now on Xarelto and labetalol.  PACs and short runs of SVT on monitor 2/2020  3.  History of PDA, status post ligation in 1968 through a lateral thoracotomy at the [facility].   4.  ASD closure in 1983 at the [facility].   5.  Hypertension, controlled.   6.  LPA stenosis, status post LPA stent 04/06/2018 [facility].   7.  Severe pulmonary insufficiency, status post Jacqueline valve 04/06/2018 with a 22 mm valve.   8.  Small branch dissection of the left lower pulmonary artery at the time of the Jacqueline valve, resolved.   9.  Normal coronary arteries at time of cath.   10.  Normal cholesterol.      It was a pleasure to see Ms. Kelley in followup.  Clinically, she is doing well from a cardiovascular standpoint.  She has not had any concerning systemic symptoms other than short runs of SVT that have improved since the zio patch in February.  She is remaining active and exercising.  We will plan echo when restrictions are lifted and call with that.      She knows to take antibiotics before dental work.  We will plan to see her back in 1 year with an echo or sooner if there are any issues in the interim.  It was a pleasure to see her.  Please do not hesitate to contact me  with any questions or concerns.        JOSUE Mccollum MD

## 2020-11-14 ENCOUNTER — HEALTH MAINTENANCE LETTER (OUTPATIENT)
Age: 60
End: 2020-11-14

## 2020-12-14 NOTE — PLAN OF CARE
Problem: Patient Care Overview  Goal: Plan of Care/Patient Progress Review  D: Heydi Kelley is a 57 year old female with history of congenital pulmonary stenosis, atrial septal defect, patent ductus arteriosus.  She underwent pulmonary valvuloplasty in 1962, 1964 and 1983 through a median sternotomy and has known severe pulmonary insufficiency but with normal right ventricular chamber size and function.  She also has a history of atrial arrhythmias, history of patent ductus arteriosus that was ligated in 1968 and then an atrial septal defect that was closed in 1983 who is now s/p LPA stent and 2 palmaz transhepatic biliary stents in the RVOT and 22 medtronic abbi valve on 4/6/18.    Neuro: lethargic but alert and oriented x4. Tylenol for pain.  CV: 1 unit platelets given. Tmax 101.3 treated with tylenol. Quiet/withdrawn. C/o chest pain, MD aware EKG ordered and found to be unremarkable.   Pulm: Extubated at 0830 this AM with RT. CSI fellow, and Dr Mccollum at the bedside. Transitioned to 2lpm via NC. Minimal bloody secretions post extubation. Continues to have bloody sputum with cough.   GI/: Bedside swallow passed shortly after extubation. Mild nausea treated with zofran. UO minimal over the day shift. Active BS, no BM. zofran PRN for nausea.   Skin: PIV left hand and and PIV in upper right arm. Bilateral groin sites.     Plan: Monitor overnight and possible discharge/transfer tomorrow.          Include Location In Plan?: No Detail Level: Zone

## 2021-04-09 ENCOUNTER — CARE COORDINATION (OUTPATIENT)
Dept: CARDIOLOGY | Facility: CLINIC | Age: 61
End: 2021-04-09

## 2021-04-09 DIAGNOSIS — Z95.2 S/P PULMONARY VALVE REPLACEMENT: ICD-10-CM

## 2021-04-09 DIAGNOSIS — J98.4 PULMONARY INSUFFICIENCY: Primary | ICD-10-CM

## 2021-04-13 ENCOUNTER — OFFICE VISIT (OUTPATIENT)
Dept: CARDIOLOGY | Facility: CLINIC | Age: 61
End: 2021-04-13
Attending: INTERNAL MEDICINE
Payer: COMMERCIAL

## 2021-04-13 VITALS
HEIGHT: 67 IN | OXYGEN SATURATION: 100 % | SYSTOLIC BLOOD PRESSURE: 120 MMHG | BODY MASS INDEX: 27.88 KG/M2 | HEART RATE: 80 BPM | DIASTOLIC BLOOD PRESSURE: 80 MMHG | WEIGHT: 177.6 LBS

## 2021-04-13 DIAGNOSIS — Z95.2 S/P PULMONARY VALVE REPLACEMENT: ICD-10-CM

## 2021-04-13 DIAGNOSIS — J98.4 PULMONARY INSUFFICIENCY: ICD-10-CM

## 2021-04-13 PROCEDURE — G0463 HOSPITAL OUTPT CLINIC VISIT: HCPCS

## 2021-04-13 PROCEDURE — 93325 DOPPLER ECHO COLOR FLOW MAPG: CPT

## 2021-04-13 PROCEDURE — 99215 OFFICE O/P EST HI 40 MIN: CPT | Performed by: INTERNAL MEDICINE

## 2021-04-13 PROCEDURE — 93306 TTE W/DOPPLER COMPLETE: CPT | Mod: 26 | Performed by: PEDIATRICS

## 2021-04-13 ASSESSMENT — MIFFLIN-ST. JEOR: SCORE: 1416.63

## 2021-04-13 ASSESSMENT — PAIN SCALES - GENERAL: PAINLEVEL: NO PAIN (0)

## 2021-04-13 NOTE — NURSING NOTE
Cardiac Testing: Patient given instructions regarding  echocardiogram . Discussed purpose, preparation, procedure and when to expect results reported back to the patient. Patient demonstrated understanding of this information and agreed to call with further questions or concerns.    Labs: Patient was instructed to return for the next laboratory testing in 1 year . Patient demonstrated understanding of this information and agreed to call with further questions or concerns.     Med Reconcile: Reviewed and verified all current medications with the patient. The updated medication list was printed and given to the patient.    Return Appointment: Follow up with Dr Mccollum in one year with an echo and fasting labs.  Patient given instructions regarding scheduling next clinic visit. Patient demonstrated understanding of this information and agreed to call with further questions or concerns.    Patient stated she understood all health information given and agreed to call with further questions or concerns.    Tru Peterson, RN  Cardiology RN Care Coordinator  [phone]

## 2021-04-13 NOTE — NURSING NOTE
Chief Complaint   Patient presents with     Follow Up      60 year old female with history of ASD s/p closure, PDA s/p ligation, A flutter/ Afib, hypertension, hyperlipidemia, and pulmonary stenosis s/p pulmonary valvotomy x 3, s/p LPA stenting and Jacqueline valve placement 4/2018 presenting for follow up      Vitals were taken and medications where reconciled.    Erick Yo, EMT  3:24 PM

## 2021-04-13 NOTE — LETTER
2021      RE: Heydi Kelley  [address] [facility] [id number]       Dear Colleague,    Thank you for the opportunity to participate in the care of your patient, Heydi Kelley, at the [facility]. Please see a copy of my visit note below.    CARDIOLOGY CONSULTATION:    Ms. Kelley is a very pleasant 60-year-old woman whose past medical history is significant for congenital pulmonary stenosis, atrial septal defect and PDA, undergoing pulmonary valvuloplasty in ,  and  through a median sternotomy.  She also has a history of patent ductus arteriosus that was ligated in  and atrial septal defect that was closed in .  Heydi underwent a Jacqueline valve with LPA stent on 2018.  She had a 22 mm Jacqueline valve placed and placement of an LPA stent at that time. Her postoperative course was complicated by a small dissection of a branch of the left lower pulmonary artery, but she did well with no subsequent issues.      I last saw her 2020.  Her last CPX compared to that from before surgery she improved going 65% compared to 62% of predicted.  Her RER was actually less so there was an overall improvement.        She is continuing to exercise regularly.   She has not had any concerning symptoms other than palpitations for which we did a 12 day zio patch 2020 and she had a few runs of SVT up to 15 seconds during that duration with which she was symptomatic. But she reports that those are better now.    She takes abx before the dentist.    BP has been good.  Weight is up 15 pounds.  She continues to work from home as she has always done.     Echo today is stable on preliminary read.  She continues to have sinus arrhythmia. She has not had colonoscopy and I encouraged her to do that. Her mom  in November after breaking her hip and she gained weight after that. Her  is on disability and his dad had a  clock fall on him last month and he just  of 89 as a result from pelvic fracture. She has had the covid vaccine.    PAST MEDICAL HISTORY:  Past Medical History:   Diagnosis Date     Atrial flutter (H)      Hyperlipidemia      Hypertension      S/P atrial septal defect closure      S/P PDA repair      S/P pulmonary valvulotomy        CURRENT MEDICATIONS:  Current Outpatient Medications   Medication Sig Dispense Refill     amoxicillin (AMOXIL) 500 MG capsule 2,000 mg (4 tablets) 30-60 min prior to dental work 4 capsule 3     aspirin 81 MG tablet Take 1 tablet (81 mg) by mouth daily 90 tablet 3     cyanocolbalamin (VITAMIN  B-12) 100 MCG tablet Take 100 mcg by mouth daily       ferrous sulfate (IRON) 325 (65 FE) MG tablet Take 325 mg by mouth every other day       labetalol (NORMODYNE) 300 MG tablet Take 1 tablet (300 mg) by mouth 2 times daily 60 tablet 3     Vitamin D, Cholecalciferol, 25 MCG (1000 UT) CAPS Pt taking BID         PAST SURGICAL HISTORY:  Past Surgical History:   Procedure Laterality Date     pulmonary valvulotomy         ALLERGIES  Patient has no known allergies.    FAMILY HX:  No family history on file.    SOCIAL HX:  Social History     Socioeconomic History     Marital status:      Spouse name: Not on file     Number of children: Not on file     Years of education: Not on file     Highest education level: Not on file   Occupational History     Not on file   Social Needs     Financial resource strain: Not on file     Food insecurity     Worry: Not on file     Inability: Not on file     Transportation needs     Medical: Not on file     Non-medical: Not on file   Tobacco Use     Smoking status: Never Smoker     Smokeless tobacco: Never Used   Substance and Sexual Activity     Alcohol use: No     Drug use: No     Sexual activity: Not on file   Lifestyle     Physical activity     Days per week: Not on file     Minutes per session: Not on file     Stress: Not on file   Relationships     Social  "connections     Talks on phone: Not on file     Gets together: Not on file     Attends Buddhism service: Not on file     Active member of club or organization: Not on file     Attends meetings of clubs or organizations: Not on file     Relationship status: Not on file     Intimate partner violence     Fear of current or ex partner: Not on file     Emotionally abused: Not on file     Physically abused: Not on file     Forced sexual activity: Not on file   Other Topics Concern     Not on file   Social History Narrative     Not on file       ROS:  Constitutional: No fever, chills, or sweats. No weight gain/loss.   ENT: No visual disturbance, ear ache, epistaxis, sore throat.   Allergies/Immunologic: Negative.   Respiratory: No cough, hemoptysis.   Cardiovascular: As per HPI.   GI: No nausea, vomiting, hematemesis, melena, or hematochezia.   : No urinary frequency, dysuria, or hematuria.   Integument: Negative.   Psychiatric: Negative.   Neuro: Negative.   Endocrinology: Negative.   Musculoskeletal: No myalgia.    VITAL SIGNS:  /80   Pulse 80   Ht 1.699 m (5' 6.9\")   Wt 80.6 kg (177 lb 9.6 oz)   SpO2 100%   BMI 27.90 kg/m    Body mass index is 27.9 kg/m .  Wt Readings from Last 2 Encounters:   04/13/21 80.6 kg (177 lb 9.6 oz)   04/12/19 70.8 kg (156 lb 1.6 oz)       PHYSICAL EXAM  Heydi Kelley IS A 60 year old female.in no acute distress.  HEENT: Unremarkable.  Neck: JVP normal.  Lungs: CTA.  Cor: RRR. Normal S1 and S2.  Soft systolic murmur.  PMI in Lf 5th ICS.  Abd: Soft, nontender, nondistended.  NABS. Extremities: No C/C/E.    Neuro: Grossly intact.    LABS    Lab Results   Component Value Date    WBC 3.4 04/27/2018     Lab Results   Component Value Date    RBC 3.71 04/27/2018     Lab Results   Component Value Date    HGB 11.8 04/27/2018     Lab Results   Component Value Date    HCT 35.8 04/27/2018     No components found for: MCT  Lab Results   Component Value Date    MCV 97 04/27/2018     Lab " Results   Component Value Date    MCH 31.8 04/27/2018     Lab Results   Component Value Date    MCHC 33.0 04/27/2018     Lab Results   Component Value Date    RDW 12.9 04/27/2018     Lab Results   Component Value Date     04/27/2018      Recent Labs   Lab Test 04/27/18  1232 04/09/18  0045    138   POTASSIUM 4.2 3.9   CHLORIDE 104 107   CO2 28 26   ANIONGAP 7 6   GLC 88 96   BUN 10 9   CR 0.87 0.82   KEMAR 8.9 8.4*     Recent Labs   Lab Test 04/07/18  0308 04/06/18  1900 10/13/17  1041   CHOL  --  126 178   HDL  --  53 71   LDL  --  51 90   TRIG 79 110 81   NHDL  --  73 106      IMPRESSION, REPORT, PLAN:   1.  Congenital pulmonary valve stenosis, status post pulmonary valvuloplasty in 1962, 1964 and 1983 through a median sternotomy with severe pulmonary insufficiency and normal right ventricular chamber size and function.   2.  Atrial arrhythmias, initial event 08/2016 with atrial fibrillation/flutter, subsequent event 02/2017, now on Xarelto and labetalol.  PACs and short runs of SVT on monitor 2/2020  3.  History of PDA, status post ligation in 1968 through a lateral thoracotomy at the [facility].   4.  ASD closure in 1983 at the [facility].   5.  Hypertension, controlled.   6.  LPA stenosis, status post LPA stent 04/06/2018 [facility].   7.  Severe pulmonary insufficiency, status post Jacqueline valve 04/06/2018 with a 22 mm valve.   8.  Small branch dissection of the left lower pulmonary artery at the time of the Jacqueline valve, resolved.   9.  Normal coronary arteries at time of cath.   10.  Normal cholesterol.      It was a pleasure to see Ms. Kelley in followup.  Clinically, she is doing well from a cardiovascular standpoint.  She has not had any concerning systemic symptoms.  Her echo echo on preliminary read is stable. We discussed diet and exercise.      She knows to take antibiotics before dental work.  We will plan to see her back in 1 year with an echo or  sooner if there are any issues in the interim.  It was a pleasure to see her.  Please do not hesitate to contact me with any questions or concerns.    JOSUE Mccollum MD  43 minutes face to face documentation and review of records on day of visit

## 2021-04-13 NOTE — PROGRESS NOTES
CARDIOLOGY CONSULTATION:    Ms. Kelley is a very pleasant 60-year-old woman whose past medical history is significant for congenital pulmonary stenosis, atrial septal defect and PDA, undergoing pulmonary valvuloplasty in ,  and  through a median sternotomy.  She also has a history of patent ductus arteriosus that was ligated in  and atrial septal defect that was closed in .  Heydi underwent a Jacqueline valve with LPA stent on 2018.  She had a 22 mm Jacqueline valve placed and placement of an LPA stent at that time. Her postoperative course was complicated by a small dissection of a branch of the left lower pulmonary artery, but she did well with no subsequent issues.      I last saw her 2020.  Her last CPX compared to that from before surgery she improved going 65% compared to 62% of predicted.  Her RER was actually less so there was an overall improvement.        She is continuing to exercise regularly.   She has not had any concerning symptoms other than palpitations for which we did a 12 day zio patch 2020 and she had a few runs of SVT up to 15 seconds during that duration with which she was symptomatic. But she reports that those are better now.    She takes abx before the dentist.    BP has been good.  Weight is up 15 pounds.  She continues to work from home as she has always done.     Echo today is stable on preliminary read.  She continues to have sinus arrhythmia. She has not had colonoscopy and I encouraged her to do that. Her mom  in November after breaking her hip and she gained weight after that. Her  is on disability and his dad had a clock fall on him last month and he just  of  as a result from pelvic fracture. She has had the covid vaccine.    PAST MEDICAL HISTORY:  Past Medical History:   Diagnosis Date     Atrial flutter (H)      Hyperlipidemia      Hypertension      S/P atrial septal defect closure      S/P PDA repair      S/P pulmonary valvulotomy         CURRENT MEDICATIONS:  Current Outpatient Medications   Medication Sig Dispense Refill     amoxicillin (AMOXIL) 500 MG capsule 2,000 mg (4 tablets) 30-60 min prior to dental work 4 capsule 3     aspirin 81 MG tablet Take 1 tablet (81 mg) by mouth daily 90 tablet 3     cyanocolbalamin (VITAMIN  B-12) 100 MCG tablet Take 100 mcg by mouth daily       ferrous sulfate (IRON) 325 (65 FE) MG tablet Take 325 mg by mouth every other day       labetalol (NORMODYNE) 300 MG tablet Take 1 tablet (300 mg) by mouth 2 times daily 60 tablet 3     Vitamin D, Cholecalciferol, 25 MCG (1000 UT) CAPS Pt taking BID         PAST SURGICAL HISTORY:  Past Surgical History:   Procedure Laterality Date     pulmonary valvulotomy         ALLERGIES  Patient has no known allergies.    FAMILY HX:  No family history on file.    SOCIAL HX:  Social History     Socioeconomic History     Marital status:      Spouse name: Not on file     Number of children: Not on file     Years of education: Not on file     Highest education level: Not on file   Occupational History     Not on file   Social Needs     Financial resource strain: Not on file     Food insecurity     Worry: Not on file     Inability: Not on file     Transportation needs     Medical: Not on file     Non-medical: Not on file   Tobacco Use     Smoking status: Never Smoker     Smokeless tobacco: Never Used   Substance and Sexual Activity     Alcohol use: No     Drug use: No     Sexual activity: Not on file   Lifestyle     Physical activity     Days per week: Not on file     Minutes per session: Not on file     Stress: Not on file   Relationships     Social connections     Talks on phone: Not on file     Gets together: Not on file     Attends [employer] service: Not on file     Active member of club or organization: Not on file     Attends meetings of clubs or organizations: Not on file     Relationship status: Not on file     Intimate partner violence     Fear of current or ex partner:  "Not on file     Emotionally abused: Not on file     Physically abused: Not on file     Forced sexual activity: Not on file   Other Topics Concern     Not on file   Social History Narrative     Not on file       ROS:  Constitutional: No fever, chills, or sweats. No weight gain/loss.   ENT: No visual disturbance, ear ache, epistaxis, sore throat.   Allergies/Immunologic: Negative.   Respiratory: No cough, hemoptysis.   Cardiovascular: As per HPI.   GI: No nausea, vomiting, hematemesis, melena, or hematochezia.   : No urinary frequency, dysuria, or hematuria.   Integument: Negative.   Psychiatric: Negative.   Neuro: Negative.   Endocrinology: Negative.   Musculoskeletal: No myalgia.    VITAL SIGNS:  /80   Pulse 80   Ht 1.699 m (5' 6.9\")   Wt 80.6 kg (177 lb 9.6 oz)   SpO2 100%   BMI 27.90 kg/m    Body mass index is 27.9 kg/m .  Wt Readings from Last 2 Encounters:   04/13/21 80.6 kg (177 lb 9.6 oz)   04/12/19 70.8 kg (156 lb 1.6 oz)       PHYSICAL EXAM  Heydi Kelley IS A 60 year old female.in no acute distress.  HEENT: Unremarkable.  Neck: JVP normal.  Lungs: CTA.  Cor: RRR. Normal S1 and S2.  Soft systolic murmur.  PMI in Lf 5th ICS.  Abd: Soft, nontender, nondistended.  NABS. Extremities: No C/C/E.    Neuro: Grossly intact.    LABS    Lab Results   Component Value Date    WBC 3.4 04/27/2018     Lab Results   Component Value Date    RBC 3.71 04/27/2018     Lab Results   Component Value Date    HGB 11.8 04/27/2018     Lab Results   Component Value Date    HCT 35.8 04/27/2018     No components found for: MCT  Lab Results   Component Value Date    MCV 97 04/27/2018     Lab Results   Component Value Date    MCH 31.8 04/27/2018     Lab Results   Component Value Date    MCHC 33.0 04/27/2018     Lab Results   Component Value Date    RDW 12.9 04/27/2018     Lab Results   Component Value Date     04/27/2018      Recent Labs   Lab Test 04/27/18  1232 04/09/18  0045    138   POTASSIUM 4.2 3.9 "   CHLORIDE 104 107   CO2 28 26   ANIONGAP 7 6   GLC 88 96   BUN 10 9   CR 0.87 0.82   KEMAR 8.9 8.4*     Recent Labs   Lab Test 04/07/18  0308 04/06/18  1900 10/13/17  1041   CHOL  --  126 178   HDL  --  53 71   LDL  --  51 90   TRIG 79 110 81   NHDL  --  73 106      IMPRESSION, REPORT, PLAN:   1.  Congenital pulmonary valve stenosis, status post pulmonary valvuloplasty in 1962, 1964 and 1983 through a median sternotomy with severe pulmonary insufficiency and normal right ventricular chamber size and function.   2.  Atrial arrhythmias, initial event 08/2016 with atrial fibrillation/flutter, subsequent event 02/2017, now on Xarelto and labetalol.  PACs and short runs of SVT on monitor 2/2020  3.  History of PDA, status post ligation in 1968 through a lateral thoracotomy at the [facility].   4.  ASD closure in 1983 at the [facility].   5.  Hypertension, controlled.   6.  LPA stenosis, status post LPA stent 04/06/2018 [facility].   7.  Severe pulmonary insufficiency, status post Jacqueline valve 04/06/2018 with a 22 mm valve.   8.  Small branch dissection of the left lower pulmonary artery at the time of the Jacqueline valve, resolved.   9.  Normal coronary arteries at time of cath.   10.  Normal cholesterol.      It was a pleasure to see Ms. Kelley in followup.  Clinically, she is doing well from a cardiovascular standpoint.  She has not had any concerning systemic symptoms.  Her echo echo on preliminary read is stable. We discussed diet and exercise.      She knows to take antibiotics before dental work.  We will plan to see her back in 1 year with an echo or sooner if there are any issues in the interim.  It was a pleasure to see her.  Please do not hesitate to contact me with any questions or concerns.    JOSUE Mccollum MD  43 minutes face to face documentation and review of records on day of visit

## 2021-09-12 ENCOUNTER — HEALTH MAINTENANCE LETTER (OUTPATIENT)
Age: 61
End: 2021-09-12

## 2021-11-07 ENCOUNTER — HEALTH MAINTENANCE LETTER (OUTPATIENT)
Age: 61
End: 2021-11-07

## 2022-01-02 ENCOUNTER — HEALTH MAINTENANCE LETTER (OUTPATIENT)
Age: 62
End: 2022-01-02

## 2022-11-19 ENCOUNTER — HEALTH MAINTENANCE LETTER (OUTPATIENT)
Age: 62
End: 2022-11-19

## 2023-03-08 ENCOUNTER — TRANSFERRED RECORDS (OUTPATIENT)
Dept: HEALTH INFORMATION MANAGEMENT | Facility: CLINIC | Age: 63
End: 2023-03-08
Payer: COMMERCIAL

## 2023-03-08 ENCOUNTER — MEDICAL CORRESPONDENCE (OUTPATIENT)
Dept: HEALTH INFORMATION MANAGEMENT | Facility: CLINIC | Age: 63
End: 2023-03-08
Payer: COMMERCIAL

## 2023-03-08 LAB
ALBUMIN (URINE) MG/SPEC: 7.3 MG/L (ref 7–30)
ALBUMIN/CREATININE RATIO: <7 UG/MG CREAT (ref 0–30)
CHOLESTEROL (EXTERNAL): 196 MG/DL (ref 25–200)
CREATININE (EXTERNAL): 1.09 MG/DL (ref 0.6–1.2)
CREATININE (URINE): 98.1 MG/DL (ref 9–259.5)
GFR SERPL CREATININE-BSD FRML MDRD: 51 ML/MIN/1.73M2
GLUCOSE (EXTERNAL): 93 MG/DL (ref 70–99)
HDLC SERPL-MCNC: 66 MG/DL
LDL CHOLESTEROL (EXTERNAL): 110 MG/DL (ref 0–100)
NON HDL CHOLESTEROL (EXTERNAL): 130 MG/DL (ref 0–130)
POTASSIUM (EXTERNAL): 4.4 MMOL/L (ref 3.5–5.1)
TRIGLYCERIDES (EXTERNAL): 102 MG/DL (ref 0–150)

## 2023-03-14 ENCOUNTER — TRANSCRIBE ORDERS (OUTPATIENT)
Dept: OTHER | Age: 63
End: 2023-03-14

## 2023-03-14 DIAGNOSIS — I48.92 ATRIAL FLUTTER (H): Primary | ICD-10-CM

## 2023-03-14 DIAGNOSIS — Q24.9 CONGENITAL HEART DISEASE: ICD-10-CM

## 2023-04-09 ENCOUNTER — HEALTH MAINTENANCE LETTER (OUTPATIENT)
Age: 63
End: 2023-04-09

## 2023-05-09 ENCOUNTER — TRANSFERRED RECORDS (OUTPATIENT)
Dept: HEALTH INFORMATION MANAGEMENT | Facility: CLINIC | Age: 63
End: 2023-05-09

## 2023-05-09 ENCOUNTER — OFFICE VISIT (OUTPATIENT)
Dept: CARDIOLOGY | Facility: CLINIC | Age: 63
End: 2023-05-09
Attending: INTERNAL MEDICINE
Payer: COMMERCIAL

## 2023-05-09 ENCOUNTER — LAB (OUTPATIENT)
Dept: LAB | Facility: CLINIC | Age: 63
End: 2023-05-09
Attending: INTERNAL MEDICINE
Payer: COMMERCIAL

## 2023-05-09 VITALS
SYSTOLIC BLOOD PRESSURE: 163 MMHG | BODY MASS INDEX: 28.71 KG/M2 | DIASTOLIC BLOOD PRESSURE: 100 MMHG | HEART RATE: 84 BPM | WEIGHT: 182.9 LBS | OXYGEN SATURATION: 99 % | HEIGHT: 67 IN

## 2023-05-09 DIAGNOSIS — Z87.74 S/P ATRIAL SEPTAL DEFECT CLOSURE: ICD-10-CM

## 2023-05-09 DIAGNOSIS — Z98.890 S/P PULMONARY VALVULOTOMY: ICD-10-CM

## 2023-05-09 DIAGNOSIS — Q24.9 CONGENITAL HEART DISEASE: ICD-10-CM

## 2023-05-09 DIAGNOSIS — Z87.74 S/P PDA REPAIR: ICD-10-CM

## 2023-05-09 DIAGNOSIS — I10 BENIGN ESSENTIAL HYPERTENSION: ICD-10-CM

## 2023-05-09 DIAGNOSIS — I48.92 ATRIAL FLUTTER, UNSPECIFIED TYPE (H): ICD-10-CM

## 2023-05-09 DIAGNOSIS — I48.92 ATRIAL FLUTTER (H): ICD-10-CM

## 2023-05-09 DIAGNOSIS — Q25.6 STENOSIS OF LEFT PULMONARY ARTERY: ICD-10-CM

## 2023-05-09 DIAGNOSIS — I10 BENIGN ESSENTIAL HYPERTENSION: Primary | ICD-10-CM

## 2023-05-09 LAB
ALBUMIN SERPL BCG-MCNC: 4.5 G/DL (ref 3.5–5.2)
ALP SERPL-CCNC: 96 U/L (ref 35–104)
ALT SERPL W P-5'-P-CCNC: 20 U/L (ref 10–35)
ANION GAP SERPL CALCULATED.3IONS-SCNC: 8 MMOL/L (ref 7–15)
AST SERPL W P-5'-P-CCNC: 26 U/L (ref 10–35)
BASOPHILS # BLD AUTO: 0.1 10E3/UL (ref 0–0.2)
BASOPHILS NFR BLD AUTO: 1 %
BILIRUB SERPL-MCNC: 0.4 MG/DL
BUN SERPL-MCNC: 9.2 MG/DL (ref 8–23)
CALCIUM SERPL-MCNC: 9.7 MG/DL (ref 8.8–10.2)
CHLORIDE SERPL-SCNC: 103 MMOL/L (ref 98–107)
CHOLEST SERPL-MCNC: 198 MG/DL
CREAT SERPL-MCNC: 1.01 MG/DL (ref 0.51–0.95)
DEPRECATED HCO3 PLAS-SCNC: 29 MMOL/L (ref 22–29)
EOSINOPHIL # BLD AUTO: 0.2 10E3/UL (ref 0–0.7)
EOSINOPHIL NFR BLD AUTO: 3 %
ERYTHROCYTE [DISTWIDTH] IN BLOOD BY AUTOMATED COUNT: 12 % (ref 10–15)
GFR SERPL CREATININE-BSD FRML MDRD: 63 ML/MIN/1.73M2
GLUCOSE SERPL-MCNC: 103 MG/DL (ref 70–99)
HCT VFR BLD AUTO: 39.9 % (ref 35–47)
HDLC SERPL-MCNC: 65 MG/DL
HGB BLD-MCNC: 13.3 G/DL (ref 11.7–15.7)
IMM GRANULOCYTES # BLD: 0 10E3/UL
IMM GRANULOCYTES NFR BLD: 0 %
LDLC SERPL CALC-MCNC: 109 MG/DL
LYMPHOCYTES # BLD AUTO: 1.3 10E3/UL (ref 0.8–5.3)
LYMPHOCYTES NFR BLD AUTO: 28 %
MCH RBC QN AUTO: 31.1 PG (ref 26.5–33)
MCHC RBC AUTO-ENTMCNC: 33.3 G/DL (ref 31.5–36.5)
MCV RBC AUTO: 93 FL (ref 78–100)
MONOCYTES # BLD AUTO: 0.4 10E3/UL (ref 0–1.3)
MONOCYTES NFR BLD AUTO: 8 %
NEUTROPHILS # BLD AUTO: 2.8 10E3/UL (ref 1.6–8.3)
NEUTROPHILS NFR BLD AUTO: 60 %
NONHDLC SERPL-MCNC: 133 MG/DL
NRBC # BLD AUTO: 0 10E3/UL
NRBC BLD AUTO-RTO: 0 /100
PLATELET # BLD AUTO: 174 10E3/UL (ref 150–450)
POTASSIUM SERPL-SCNC: 4.3 MMOL/L (ref 3.4–5.3)
PROT SERPL-MCNC: 7.3 G/DL (ref 6.4–8.3)
RBC # BLD AUTO: 4.28 10E6/UL (ref 3.8–5.2)
SODIUM SERPL-SCNC: 140 MMOL/L (ref 136–145)
TRIGL SERPL-MCNC: 122 MG/DL
WBC # BLD AUTO: 4.8 10E3/UL (ref 4–11)

## 2023-05-09 PROCEDURE — 93320 DOPPLER ECHO COMPLETE: CPT | Performed by: PEDIATRICS

## 2023-05-09 PROCEDURE — 80053 COMPREHEN METABOLIC PANEL: CPT | Performed by: PATHOLOGY

## 2023-05-09 PROCEDURE — 99213 OFFICE O/P EST LOW 20 MIN: CPT | Performed by: INTERNAL MEDICINE

## 2023-05-09 PROCEDURE — 93010 ELECTROCARDIOGRAM REPORT: CPT | Performed by: INTERNAL MEDICINE

## 2023-05-09 PROCEDURE — 93325 DOPPLER ECHO COLOR FLOW MAPG: CPT | Performed by: PEDIATRICS

## 2023-05-09 PROCEDURE — 99215 OFFICE O/P EST HI 40 MIN: CPT | Mod: 25 | Performed by: INTERNAL MEDICINE

## 2023-05-09 PROCEDURE — 93005 ELECTROCARDIOGRAM TRACING: CPT | Mod: RTG,XU

## 2023-05-09 PROCEDURE — 93303 ECHO TRANSTHORACIC: CPT | Performed by: PEDIATRICS

## 2023-05-09 PROCEDURE — 85025 COMPLETE CBC W/AUTO DIFF WBC: CPT | Performed by: PATHOLOGY

## 2023-05-09 PROCEDURE — 93242 EXT ECG>48HR<7D RECORDING: CPT

## 2023-05-09 PROCEDURE — 80061 LIPID PANEL: CPT | Performed by: PATHOLOGY

## 2023-05-09 PROCEDURE — 36415 COLL VENOUS BLD VENIPUNCTURE: CPT | Performed by: PATHOLOGY

## 2023-05-09 RX ORDER — HYDROCHLOROTHIAZIDE 25 MG/1
25 TABLET ORAL DAILY
Qty: 90 TABLET | Refills: 3 | Status: SHIPPED | OUTPATIENT
Start: 2023-05-09 | End: 2023-07-19

## 2023-05-09 ASSESSMENT — PAIN SCALES - GENERAL: PAINLEVEL: NO PAIN (0)

## 2023-05-09 NOTE — PATIENT INSTRUCTIONS
You were seen today in the Adult Congenital and Cardiovascular Genetics Clinic at the [facility].    Cardiology Providers you saw during your visit:  JOSUE Mccollum MD    Diagnosis: ASD    Results:  JOSUE Mccollum MD reviewed the results of your EKG, echo and labs testing today in clinic.    Recommendations for you:    START hydrochlorothiazide 25mg daily- check your blood pressure at home and let us know if it is still high  Please wear zio for 7 days- we will follow up on results      General Cardiac Recommendations:  Continue to eat a heart healthy, low salt diet.  Continue to get 20-30 minutes of aerobic activity, 4-5 days per week.  Examples of aerobic activity include walking, running, swimming, cycling, etc.  Continue to observe good oral hygiene, with regular dental visits.      SBE prophylaxis:   Yes__X__  No____    If YES is checked, follow the recommendations outlined below:  Take antibiotic(s) prior to recommended dental procedures and procedures on the respiratory tract or with infected skin, muscle or bones. SBE prophylaxis is not needed for routine GI and  procedures (ie. Colonoscopy or vaginal delivery)  Observe good oral hygiene daily, as advised by your dentist. Get regular professional dental care.  Keep cuts clean.  Infections should be treated promptly.  Symptoms of Infective Endocarditis could include: fever lasting more than 4-5 days or a recurrent fever that initially resolves but returns within 1-2 days)      Exercise restrictions:   Yes__X__  No____         If yes, list restrictions:  Must be allowed to rest if fatigued or SOB      FASTING CHOLESTEROL was checked in the last 5 years YES_X__  NO___ (2018)  If no, please follow up with your primary care physician. You should have a cholesterol screening every 5 years.      Follow-up: Follow up with Dr. Mccollum in 1 year with an echo prior.     If you have questions or concerns please contact us at:    Azucena Leo, MPH,  RN, BSN   Jennifer Alvarez (Scheduling)  Nurse Care Coordinator     Clinic   Adult Congenital and CV Genetics   Adult Congenital and CV Genetic  [facility]  (P) [phone]     (P) [phone]  [email or web address]  (F) [phone]        For after hours urgent needs, call [phone] and ask to speak to the Adult Congenital Physician on call.  Mention Job Code 0401.    For emergencies call 911.    [facility]   Clin[facility] and [facility] Center  Mail Code [id number]  [address] [place]  [address]

## 2023-05-09 NOTE — NURSING NOTE
Chief Complaint   Patient presents with     Follow Up     PDA repair          Vitals were taken and medications reconciled.     Shanti Cornejo, Visit Facilitator    9:01 AM

## 2023-05-09 NOTE — PROGRESS NOTES
CARDIOLOGY CONSULTATION:    Ms. Kelley is a very pleasant 62-year-old woman whose past medical history is significant for congenital pulmonary stenosis, atrial septal defect and PDA, undergoing pulmonary valvuloplasty in ,  and  through a median sternotomy.  She also has a history of patent ductus arteriosus that was ligated in  and atrial septal defect that was closed in .  Heydi underwent a Jacqueline valve with LPA stent on 2018.  She had a 22 mm Jacqueline valve placed and placement of an LPA stent at that time. Her postoperative course was complicated by a small dissection of a branch of the left lower pulmonary artery, but she did well with no subsequent issues.      I last saw her in .  Her last CPX compared to that from before surgery she improved going 65% compared to 62% of predicted.  Her RER was actually less so there was an overall improvement.   She takes abx before the dentist and a baby ASA.  Weight was up 15 lbs when I saw her in  compared to  and it increased just a little more.  She is not exercising.  Her  feels that she is depressed and she has a lot of anxiety.  She has been cleaning the house concerned that she is going to die and leave the work to her daughter.  Her mom  in  after a hip fracture and it is after that she really started to do more poorly and be more depressed. Her labs all looked good today.      Echo today is stable and her valve is doing well.  Her blood pressure is up even on recheck.  She is taking labetalol. She has not been checking her BP at home.    PAST MEDICAL HISTORY:  Past Medical History:   Diagnosis Date     Atrial flutter (H)      Hyperlipidemia      Hypertension      S/P atrial septal defect closure      S/P PDA repair      S/P pulmonary valvulotomy        CURRENT MEDICATIONS:  Current Outpatient Medications   Medication Sig Dispense Refill     amoxicillin (AMOXIL) 500 MG capsule 2,000 mg (4 tablets) 30-60 min prior  "to dental work 4 capsule 3     aspirin 81 MG tablet Take 1 tablet (81 mg) by mouth daily 90 tablet 3     cyanocolbalamin (VITAMIN  B-12) 100 MCG tablet Take 100 mcg by mouth daily       ferrous sulfate (IRON) 325 (65 FE) MG tablet Take 325 mg by mouth every other day       labetalol (NORMODYNE) 300 MG tablet Take 1 tablet (300 mg) by mouth 2 times daily 60 tablet 3     Vitamin D, Cholecalciferol, 25 MCG (1000 UT) CAPS Pt taking BID         PAST SURGICAL HISTORY:  Past Surgical History:   Procedure Laterality Date     pulmonary valvulotomy         ALLERGIES  Patient has no known allergies.    FAMILY HX:  No family history on file.    SOCIAL HX:  Social History     Socioeconomic History     Marital status:      Spouse name: None     Number of children: None     Years of education: None     Highest education level: None   Tobacco Use     Smoking status: Never     Smokeless tobacco: Never   Substance and Sexual Activity     Alcohol use: No     Drug use: No       ROS:  Constitutional: No fever, chills, or sweats. No weight gain/loss.   ENT: No visual disturbance, ear ache, epistaxis, sore throat.   Allergies/Immunologic: Negative.   Respiratory: No cough, hemoptysis.   Cardiovascular: As per HPI.   GI: No nausea, vomiting, hematemesis, melena, or hematochezia.   : No urinary frequency, dysuria, or hematuria.   Integument: Negative.   Psychiatric: Negative.   Neuro: Negative.   Endocrinology: Negative.   Musculoskeletal: No myalgia.    VITAL SIGNS:  BP (!) 163/100 (BP Location: Right arm, Patient Position: Sitting, Cuff Size: Adult Regular)   Pulse 84   Ht 1.689 m (5' 6.5\")   Wt 83 kg (182 lb 14.4 oz)   SpO2 99%   BMI 29.08 kg/m    Body mass index is 29.08 kg/m .  Wt Readings from Last 2 Encounters:   05/09/23 83 kg (182 lb 14.4 oz)   04/13/21 80.6 kg (177 lb 9.6 oz)       PHYSICAL EXAM  Heydi Kelley IS A 62 year old female.in no acute distress.  HEENT: Unremarkable.  Neck: JVP normal.   Lungs: CTA.  Cor: " RRR. Normal S1 and S2.  Soft systolic murmurl  Abd: Soft.  Extremities: No C/C/E.  Neuro: Grossly intact.    LABS    Lab Results   Component Value Date    WBC 4.8 05/09/2023    WBC 3.4 04/27/2018     Lab Results   Component Value Date    RBC 4.28 05/09/2023    RBC 3.71 04/27/2018     Lab Results   Component Value Date    HGB 13.3 05/09/2023    HGB 11.8 04/27/2018     Lab Results   Component Value Date    HCT 39.9 05/09/2023    HCT 35.8 04/27/2018     No components found for: MCT  Lab Results   Component Value Date    MCV 93 05/09/2023    MCV 97 04/27/2018     Lab Results   Component Value Date    MCH 31.1 05/09/2023    MCH 31.8 04/27/2018     Lab Results   Component Value Date    MCHC 33.3 05/09/2023    MCHC 33.0 04/27/2018     Lab Results   Component Value Date    RDW 12.0 05/09/2023    RDW 12.9 04/27/2018     Lab Results   Component Value Date     05/09/2023     04/27/2018      Recent Labs   Lab Test 05/09/23  0733 04/27/18  1232    139   POTASSIUM 4.3 4.2   CHLORIDE 103 104   CO2 29 28   ANIONGAP 8 7   * 88   BUN 9.2 10   CR 1.01* 0.87   KEMAR 9.7 8.9     Recent Labs   Lab Test 05/09/23  0733 03/08/23  0752 04/07/18  0308 04/06/18  1900   CHOL 198  --   --  126   HDL 65  --   --  53   *  --   --  51   TRIG 122 102   < > 110   NHDL 133*  --   --  73    < > = values in this interval not displayed.        IMPRESSION, REPORT, PLAN:   1.  Congenital pulmonary valve stenosis, status post pulmonary valvuloplasty in 1962, 1964 and 1983 through a median sternotomy with severe pulmonary insufficiency and normal right ventricular chamber size and function.   2.  Atrial arrhythmias, initial event 08/2016 with atrial fibrillation/flutter, subsequent event 02/2017, now on Xarelto and labetalol.  PACs and short runs of SVT on monitor 2/2020  3.  History of PDA, status post ligation in 1968 through a lateral thoracotomy at the [facility].   4.  ASD closure in 1983 at the [facility]  [place].   5.  Hypertension  6.  LPA stenosis, status post LPA stent 04/06/2018 [facility].   7.  Severe pulmonary insufficiency, status post Jacqueline valve 04/06/2018 with a 22 mm valve.   8.  Small branch dissection of the left lower pulmonary artery at the time of the Jacqueline valve, resolved.   9.  Normal coronary arteries at time of cath.   10.  Normal cholesterol.   11.  Depression/anxiety     It was a pleasure to see Ms. Kelley in followup.  Clinically, she is doing well from a cardiovascular standpoint.  She has not had any concerning systemic symptoms.  Her echo echo on preliminary read is stable. We discussed diet and exercise.   We discussed medications for anxiety and depression, which she will think about. Will give her a zio patch for a week.  For her hypertension gave her hydrochlorothiazide 25 mg daily and she will check her BP 2 hours after meds and let us know if still elevated at home.      She knows to take antibiotics before dental work.  We will plan to see her back in 1 year with an echo and fasting blood sugar, or sooner if there are any issues in the interim.  It was a pleasure to see her.  Please do not hesitate to contact me with any questions or concerns.     JOSUE Mccollum MD  45 minutes face to face documentation and review of records on day of visit

## 2023-05-09 NOTE — PROGRESS NOTES
Per Dr. Mccollum, patient to have 7-day zio monitor placed.  Diagnosis: A flutter  Monitor placed: Yes  Patient Instructed: Yes  Patient verbalized understanding: Yes  Holter # [id number]    Monitor was placed by JOANIE Murillo   12:30 PM

## 2023-05-09 NOTE — LETTER
2023      RE: Heydi Kelley  [address] [id number]       Dear Colleague,    Thank you for the opportunity to participate in the care of your patient, Heydi Kelley, at the [facility] [place] at [facility]. Please see a copy of my visit note below.    CARDIOLOGY CONSULTATION:    Ms. Kelley is a very pleasant 62-year-old woman whose past medical history is significant for congenital pulmonary stenosis, atrial septal defect and PDA, undergoing pulmonary valvuloplasty in ,  and  through a median sternotomy.  She also has a history of patent ductus arteriosus that was ligated in  and atrial septal defect that was closed in .  Heydi underwent a Jacqueline valve with LPA stent on 2018.  She had a 22 mm Jacqueline valve placed and placement of an LPA stent at that time. Her postoperative course was complicated by a small dissection of a branch of the left lower pulmonary artery, but she did well with no subsequent issues.      I last saw her in .  Her last CPX compared to that from before surgery she improved going 65% compared to 62% of predicted.  Her RER was actually less so there was an overall improvement.   She takes abx before the dentist and a baby ASA.  Weight was up 15 lbs when I saw her in  compared to  and it increased just a little more.  She is not exercising.  Her  feels that she is depressed and she has a lot of anxiety.  She has been cleaning the house concerned that she is going to die and leave the work to her daughter.  Her mom  in  after a hip fracture and it is after that she really started to do more poorly and be more depressed. Her labs all looked good today.      Echo today is stable and her valve is doing well.  Her blood pressure is up even on recheck.  She is taking labetalol. She has not been checking her BP at home.    PAST MEDICAL HISTORY:  Past Medical  "History:   Diagnosis Date    Atrial flutter (H)     Hyperlipidemia     Hypertension     S/P atrial septal defect closure     S/P PDA repair     S/P pulmonary valvulotomy        CURRENT MEDICATIONS:  Current Outpatient Medications   Medication Sig Dispense Refill    amoxicillin (AMOXIL) 500 MG capsule 2,000 mg (4 tablets) 30-60 min prior to dental work 4 capsule 3    aspirin 81 MG tablet Take 1 tablet (81 mg) by mouth daily 90 tablet 3    cyanocolbalamin (VITAMIN  B-12) 100 MCG tablet Take 100 mcg by mouth daily      ferrous sulfate (IRON) 325 (65 FE) MG tablet Take 325 mg by mouth every other day      labetalol (NORMODYNE) 300 MG tablet Take 1 tablet (300 mg) by mouth 2 times daily 60 tablet 3    Vitamin D, Cholecalciferol, 25 MCG (1000 UT) CAPS Pt taking BID         PAST SURGICAL HISTORY:  Past Surgical History:   Procedure Laterality Date    pulmonary valvulotomy         ALLERGIES  Patient has no known allergies.    FAMILY HX:  No family history on file.    SOCIAL HX:  Social History     Socioeconomic History    Marital status:      Spouse name: None    Number of children: None    Years of education: None    Highest education level: None   Tobacco Use    Smoking status: Never    Smokeless tobacco: Never   Substance and Sexual Activity    Alcohol use: No    Drug use: No       ROS:  Constitutional: No fever, chills, or sweats. No weight gain/loss.   ENT: No visual disturbance, ear ache, epistaxis, sore throat.   Allergies/Immunologic: Negative.   Respiratory: No cough, hemoptysis.   Cardiovascular: As per HPI.   GI: No nausea, vomiting, hematemesis, melena, or hematochezia.   : No urinary frequency, dysuria, or hematuria.   Integument: Negative.   Psychiatric: Negative.   Neuro: Negative.   Endocrinology: Negative.   Musculoskeletal: No myalgia.    VITAL SIGNS:  BP (!) 163/100 (BP Location: Right arm, Patient Position: Sitting, Cuff Size: Adult Regular)   Pulse 84   Ht 1.689 m (5' 6.5\")   Wt 83 kg (182 " lb 14.4 oz)   SpO2 99%   BMI 29.08 kg/m    Body mass index is 29.08 kg/m .  Wt Readings from Last 2 Encounters:   05/09/23 83 kg (182 lb 14.4 oz)   04/13/21 80.6 kg (177 lb 9.6 oz)       PHYSICAL EXAM  Heydi Kelley IS A 62 year old female.in no acute distress.  HEENT: Unremarkable.  Neck: JVP normal.   Lungs: CTA.  Cor: RRR. Normal S1 and S2.  Soft systolic murmurl  Abd: Soft.  Extremities: No C/C/E.  Neuro: Grossly intact.    LABS    Lab Results   Component Value Date    WBC 4.8 05/09/2023    WBC 3.4 04/27/2018     Lab Results   Component Value Date    RBC 4.28 05/09/2023    RBC 3.71 04/27/2018     Lab Results   Component Value Date    HGB 13.3 05/09/2023    HGB 11.8 04/27/2018     Lab Results   Component Value Date    HCT 39.9 05/09/2023    HCT 35.8 04/27/2018     No components found for: MCT  Lab Results   Component Value Date    MCV 93 05/09/2023    MCV 97 04/27/2018     Lab Results   Component Value Date    MCH 31.1 05/09/2023    MCH 31.8 04/27/2018     Lab Results   Component Value Date    MCHC 33.3 05/09/2023    MCHC 33.0 04/27/2018     Lab Results   Component Value Date    RDW 12.0 05/09/2023    RDW 12.9 04/27/2018     Lab Results   Component Value Date     05/09/2023     04/27/2018      Recent Labs   Lab Test 05/09/23  0733 04/27/18  1232    139   POTASSIUM 4.3 4.2   CHLORIDE 103 104   CO2 29 28   ANIONGAP 8 7   * 88   BUN 9.2 10   CR 1.01* 0.87   KEMAR 9.7 8.9     Recent Labs   Lab Test 05/09/23  0733 03/08/23  0752 04/07/18  0308 04/06/18  1900   CHOL 198  --   --  126   HDL 65  --   --  53   *  --   --  51   TRIG 122 102   < > 110   NHDL 133*  --   --  73    < > = values in this interval not displayed.        IMPRESSION, REPORT, PLAN:   1.  Congenital pulmonary valve stenosis, status post pulmonary valvuloplasty in 1962, 1964 and 1983 through a median sternotomy with severe pulmonary insufficiency and normal right ventricular chamber size and function.   2.  Atrial  arrhythmias, initial event 08/2016 with atrial fibrillation/flutter, subsequent event 02/2017, now on Xarelto and labetalol.  PACs and short runs of SVT on monitor 2/2020  3.  History of PDA, status post ligation in 1968 through a lateral thoracotomy at the [facility].   4.  ASD closure in 1983 at the [facility].   5.  Hypertension  6.  LPA stenosis, status post LPA stent 04/06/2018 [facility].   7.  Severe pulmonary insufficiency, status post Jacqueline valve 04/06/2018 with a 22 mm valve.   8.  Small branch dissection of the left lower pulmonary artery at the time of the Jacqueline valve, resolved.   9.  Normal coronary arteries at time of cath.   10.  Normal cholesterol.   11.  Depression/anxiety     It was a pleasure to see Ms. Kelley in followup.  Clinically, she is doing well from a cardiovascular standpoint.  She has not had any concerning systemic symptoms.  Her echo echo on preliminary read is stable. We discussed diet and exercise.   We discussed medications for anxiety and depression, which she will think about. Will give her a zio patch for a week.  For her hypertension gave her hydrochlorothiazide 25 mg daily and she will check her BP 2 hours after meds and let us know if still elevated at home.      She knows to take antibiotics before dental work.  We will plan to see her back in 1 year with an echo and fasting blood sugar, or sooner if there are any issues in the interim.  It was a pleasure to see her.  Please do not hesitate to contact me with any questions or concerns.     JOSUE Mccollum MD  45 minutes face to face documentation and review of records on day of visit

## 2023-05-10 LAB
ATRIAL RATE - MUSE: 70 BPM
DIASTOLIC BLOOD PRESSURE - MUSE: NORMAL MMHG
INTERPRETATION ECG - MUSE: NORMAL
P AXIS - MUSE: 58 DEGREES
PR INTERVAL - MUSE: 186 MS
QRS DURATION - MUSE: 76 MS
QT - MUSE: 420 MS
QTC - MUSE: 453 MS
R AXIS - MUSE: 76 DEGREES
SYSTOLIC BLOOD PRESSURE - MUSE: NORMAL MMHG
T AXIS - MUSE: 54 DEGREES
VENTRICULAR RATE- MUSE: 70 BPM

## 2023-06-14 ENCOUNTER — MYC MEDICAL ADVICE (OUTPATIENT)
Dept: CARDIOLOGY | Facility: CLINIC | Age: 63
End: 2023-06-14
Payer: COMMERCIAL

## 2023-06-14 DIAGNOSIS — I10 ESSENTIAL HYPERTENSION: ICD-10-CM

## 2023-06-15 RX ORDER — LABETALOL 200 MG/1
200 TABLET, FILM COATED ORAL 3 TIMES DAILY
Qty: 270 TABLET | Refills: 3 | Status: SHIPPED | OUTPATIENT
Start: 2023-06-15 | End: 2023-06-19

## 2023-06-15 NOTE — TELEPHONE ENCOUNTER
Date: 6/15/2023    Time of Call: 3:20 PM     Diagnosis:  hypertension     [ TORB ] Ordering provider: Olivia Mccollum MD  Order: labetalol 200 mg three times a day, patient was currently taking 100 mg three times a day     Order received by: Kourtney Pérez RN      Follow-up/additional notes: sent to pharmacy and updated patient

## 2023-06-19 ENCOUNTER — TELEPHONE (OUTPATIENT)
Dept: CARDIOLOGY | Facility: CLINIC | Age: 63
End: 2023-06-19
Payer: COMMERCIAL

## 2023-06-19 ENCOUNTER — NURSE TRIAGE (OUTPATIENT)
Dept: CARDIOLOGY | Facility: CLINIC | Age: 63
End: 2023-06-19
Payer: COMMERCIAL

## 2023-06-19 DIAGNOSIS — I10 ESSENTIAL HYPERTENSION: ICD-10-CM

## 2023-06-19 RX ORDER — LABETALOL 200 MG/1
300 TABLET, FILM COATED ORAL 3 TIMES DAILY
Qty: 410 TABLET | Refills: 3 | Status: SHIPPED | OUTPATIENT
Start: 2023-06-19 | End: 2023-10-06

## 2023-06-19 NOTE — TELEPHONE ENCOUNTER
Spoke with patient, went over what she had told the triage line. Message sent to Dr Mccollum. Instructed patient to go to ER if she does not feel well, she states right now she feels ok. Instructed to find out where the closest ER is in case she needs to go in. Verified she is taking the increased dose of labetalol and eliquis. Will call patient back with recommendations from Dr Mccollum. Patient is at hotel room and will rest, she did not sleep much on the plane on the way to [facility]. Patient verbalized understanding and is in agreement to the plan.

## 2023-06-19 NOTE — TELEPHONE ENCOUNTER
Spoke with patient, went over what she had told the triage line. Message sent to Dr Mccollum. Instructed patient to go to ER if she does not feel well, she states right now she feels ok. Instructed to find out where the closest ER is in case she needs to go in. Verified she is taking the increased dose of labetalol and eliquis. Will call patient back with recommendations from Dr Mccollum. Patient is at hotel room and will rest, she did not sleep much on the plane on the way to [facility]. Patient verbalized understanding and is in agreement to the plan.

## 2023-06-19 NOTE — TELEPHONE ENCOUNTER
Health Call Center    Phone Message    May a detailed message be left on voicemail: yes     Reason for Call: Other: Pt is out of the country and believes she may be in Aflutter.  She is not sure but is very concerned about how she is feeling and how fast her heart is beating 86-90 while she is walking aournd.   Transfered pt to Triage Nurse and sending TE as pt would very much like to talk to Dr. Mccollum or her staff on what she should do.      Action Taken: Message routed to:  [facility] ([facility] cardio    Travel Screening: Not Applicable     Thank you!  [facility]

## 2023-06-19 NOTE — TELEPHONE ENCOUNTER
Date: 6/19/2023    Time of Call: 11:13 AM     Diagnosis:  Afib/flutter     [ TORB ] Ordering provider: Olivia Mccollum MD    Order: increase labetalol to 300 mg TID, and continue Eliquis for blood thinner. Heart rates are safe and and long as patient continues to be feeling ok she can continue her trip.      Order received by: Kourtney Pérez RN      Follow-up/additional notes: updated patient, she is reassured and will go to ED if she does not feel well, but now since she is feeling ok she will continue her medications, increase labetalol and understands plan.

## 2023-06-19 NOTE — TELEPHONE ENCOUNTER
"Received a call from the call center to speak to patient about A fib. She is currently out of the country.  Spoke to Heydi who says she had a A flutter cardioversion last week on 6/11/23.  She says she was given the okay to go on a trip by .  She says she felt her heart racing while still on the plan when she arrived to [facility]. She is on a group tour.  She says her resting HR per her fitbit is about 86 bpm, but when she is up walking, ranging 124, 107, 114, 116 bpm. She says she feels \"ok\" during this time. She denies dizziness, shortness of breath. She says she maybe has some weakness, and states \"I don't know\".  She is very worried , anxious, and tearful during conversation because she does not know how to get to an ED there and does not know what to do.  Advised a message will be sent to [facility] for follow up.  Please call [phone].  1. DESCRIPTION: \"Please describe your heart rate or heartbeat that you are having\" (e.g., fast/slow, regular/irregular, skipped or extra beats, \"palpitations\") fast  2. ONSET: \"When did it start?\" (Minutes, hours or days)   3. DURATION: \"How long does it last\" (e.g., seconds, minutes, hours)  4. PATTERN \"Does it come and go, or has it been constant since it started?\" \"Does it get worse with exertion?\" \"Are you feeling it now?\"  5. TAP: \"Using your hand, can you tap out what you are feeling on a chair or table in front of you, so that I can hear?\" (Note: not all patients can do this)   6. HEART RATE: \"Can you tell me your heart rate?\" \"How many beats in 15 seconds?\" (Note: not all patients can do this) see above  7. RECURRENT SYMPTOM: \"Have you ever had this before?\" If Yes, ask: \"When was the last time?\" and \"What happened that time?\"   8. CAUSE: \"What do you think is causing the palpitations?\"  9. CARDIAC HISTORY: \"Do you have any history of heart disease?\" (e.g., heart attack, angina, bypass surgery, angioplasty, arrhythmia) A fltuter s/p cardioversion  10. " "OTHER SYMPTOMS: \"Do you have any other symptoms?\" (e.g., dizziness, chest pain, sweating, difficulty breathing) possible weakness      "

## 2023-07-06 PROCEDURE — 93248 EXT ECG>7D<15D REV&INTERPJ: CPT | Performed by: INTERNAL MEDICINE

## 2023-07-07 ENCOUNTER — ALLIED HEALTH/NURSE VISIT (OUTPATIENT)
Dept: CARDIOLOGY | Facility: CLINIC | Age: 63
End: 2023-07-07
Attending: INTERNAL MEDICINE
Payer: COMMERCIAL

## 2023-07-07 DIAGNOSIS — Z95.2 PULMONARY VALVE REPLACED: ICD-10-CM

## 2023-07-07 DIAGNOSIS — Z87.74 S/P ATRIAL SEPTAL DEFECT CLOSURE: ICD-10-CM

## 2023-07-07 DIAGNOSIS — Z87.74 S/P PDA REPAIR: ICD-10-CM

## 2023-07-07 DIAGNOSIS — I10 ESSENTIAL HYPERTENSION: ICD-10-CM

## 2023-07-07 DIAGNOSIS — I37.1 NONRHEUMATIC PULMONARY VALVE INSUFFICIENCY: ICD-10-CM

## 2023-07-07 DIAGNOSIS — I48.92 ATRIAL FLUTTER, UNSPECIFIED TYPE (H): ICD-10-CM

## 2023-07-10 ENCOUNTER — MYC MEDICAL ADVICE (OUTPATIENT)
Dept: CARDIOLOGY | Facility: CLINIC | Age: 63
End: 2023-07-10
Payer: COMMERCIAL

## 2023-07-10 DIAGNOSIS — I10 BENIGN ESSENTIAL HYPERTENSION: ICD-10-CM

## 2023-07-19 RX ORDER — HYDROCHLOROTHIAZIDE 12.5 MG/1
12.5 TABLET ORAL DAILY
Qty: 90 TABLET | Refills: 0 | Status: SHIPPED | OUTPATIENT
Start: 2023-07-19 | End: 2024-04-11

## 2023-07-19 NOTE — TELEPHONE ENCOUNTER
Date: 7/19/2023    Time of Call: 2:52 PM     Diagnosis:  A flutter     [ TORB ] Ordering provider:  March  Order: hydrochlorothiazide 12.5mg     Order received by: Debra TAY     Follow-up/additional notes: n/a

## 2023-07-27 ENCOUNTER — MYC MEDICAL ADVICE (OUTPATIENT)
Dept: CARDIOLOGY | Facility: CLINIC | Age: 63
End: 2023-07-27
Payer: COMMERCIAL

## 2023-08-22 ENCOUNTER — MYC MEDICAL ADVICE (OUTPATIENT)
Dept: CARDIOLOGY | Facility: CLINIC | Age: 63
End: 2023-08-22
Payer: COMMERCIAL

## 2023-10-06 ENCOUNTER — OFFICE VISIT (OUTPATIENT)
Dept: CARDIOLOGY | Facility: CLINIC | Age: 63
End: 2023-10-06
Attending: INTERNAL MEDICINE
Payer: COMMERCIAL

## 2023-10-06 VITALS
BODY MASS INDEX: 27.37 KG/M2 | HEART RATE: 82 BPM | DIASTOLIC BLOOD PRESSURE: 88 MMHG | OXYGEN SATURATION: 98 % | SYSTOLIC BLOOD PRESSURE: 143 MMHG | HEIGHT: 67 IN | WEIGHT: 174.4 LBS

## 2023-10-06 DIAGNOSIS — Z95.2 PULMONARY VALVE REPLACED: ICD-10-CM

## 2023-10-06 DIAGNOSIS — Z87.74 S/P PDA REPAIR: ICD-10-CM

## 2023-10-06 DIAGNOSIS — I48.92 ATRIAL FLUTTER, UNSPECIFIED TYPE (H): ICD-10-CM

## 2023-10-06 DIAGNOSIS — Z87.74 S/P ATRIAL SEPTAL DEFECT CLOSURE: ICD-10-CM

## 2023-10-06 DIAGNOSIS — I10 ESSENTIAL HYPERTENSION: ICD-10-CM

## 2023-10-06 DIAGNOSIS — I37.1 NONRHEUMATIC PULMONARY VALVE INSUFFICIENCY: ICD-10-CM

## 2023-10-06 PROCEDURE — 93010 ELECTROCARDIOGRAM REPORT: CPT | Performed by: INTERNAL MEDICINE

## 2023-10-06 PROCEDURE — 99213 OFFICE O/P EST LOW 20 MIN: CPT | Performed by: INTERNAL MEDICINE

## 2023-10-06 PROCEDURE — 93005 ELECTROCARDIOGRAM TRACING: CPT

## 2023-10-06 PROCEDURE — 99204 OFFICE O/P NEW MOD 45 MIN: CPT | Performed by: INTERNAL MEDICINE

## 2023-10-06 RX ORDER — LABETALOL 200 MG/1
200 TABLET, FILM COATED ORAL 3 TIMES DAILY
Qty: 270 TABLET | Refills: 3 | Status: SHIPPED | OUTPATIENT
Start: 2023-10-06 | End: 2024-04-11

## 2023-10-06 RX ORDER — CHOLECALCIFEROL (VITAMIN D3) 50 MCG
1 TABLET ORAL DAILY
COMMUNITY

## 2023-10-06 ASSESSMENT — PAIN SCALES - GENERAL: PAINLEVEL: NO PAIN (0)

## 2023-10-06 NOTE — PROGRESS NOTES
ELECTROPHYSIOLOGY CLINIC VISIT    Assessment/Recommendations   Assessment/Plan:    Ms. 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     Paroxsymal Atrial Fibrillation/Flutter:   1. Stroke Prophylaxis:  CHADSVASC=+HTN, +gender  2, corresponding to a 2.2% annual stroke / systemic emolism event rate. indicating need for long term oral anticoagulation.  She is appropriately on Xarelto. No bleeding issues.   2. Rate Control: Continue labetalol, decrease to 200 mg daily.   3. Rhythm Control: Cardioversion, Antiarrhythmics and/or ablation are options for rhythm control. She has had DCCV for AF 7/2016 and DCCV for AFL 2/2017 and then in 6/2023. We discussed that is she developed frequent episodes of AF we would consider starting AAT. If she develops recurrent AFL, we would prefer ablation for management. At this time, we would not perform any further interventions given infrequency of her occurances. If increased recurrences, then we should consider AAT versus ablation therapy  4. Risk Factor Management: maintain tight BP control, and BEV evaluation as indicated.       Follow up on an as needed basis.       History of Present Illness/Subjective    Ms. Heydi Kelley is a 63 year old female who comes in today for EP follow-up of AF/AFL.    Ms. Kelley is a 63 year old female who has a past medical history significant for congenital pulmonary stenosis ASD and PDA s/p multiple pulmonary valvulotomy (1962, 1964, 1968, and 1983) with closure of PDA 1968 and closure on ASD 1983,now with severe pulmonary valve insufficiency s/p LPA stent and abbi valve placement  4/2018, HTN, HLD, scoliosis, and PAF/AFL (CHADSVASC 2 on Xarelto) s/p DCCVs last 6/2023.      EP Visit 10/13/17: She has now known severe pulmonary valve insufficiency. Her RV size and function and remained normal. She is without activity limitations. She began to develop AF/AFL in 2016. She was seen by EP at [facility] who started her on Xarelto. She then underwent cardiac event monitoring which showed brief paroxysms of AT. She then had recurrences of AF/AFL and had DCCV in 7/2016 and 2/2017. Given her atrial arrhythmias and severe RA enlargement, decision was made to replace pulmonary valve. This was scheduled to be done in 1/2017 at [place], but when she followed up in just prior to valve surgery decision was made to cancel surgery considering no recurrent events since her DCCV in 7/2016. She then had another episode of AF/AFL requiring DCCV in 2/2017. She reports symptoms of palpitations and decreased stamina when in AF/AFL. Of note, she did present to OSH ER once with symptoms of palpitation and was found to be in sinus; however, the other times she was in AF/AFL. Review of 12 lead ECGs show she has had both AF (7/2016) and AFL (2/2017). Her last cardiopulmonary stress test, however, was at [place] in 02/2014, and she went 6.7 minutes with a functional aerobic capacity of 72.8%.  Her RER was 1.15, peak VO2 was 73% of predicted at 20.1 mL/kg/minute.  She had normal blood pressure and heart rate response to exercise. She has had longstanding HTN which has been under adequate control with labetalol. CMRI today shows moderate to severe pulmonic insufficiency due to restricted closure of the right pulmoniccusp. There is branch stenosis of the proximal left pulmonary artery (1.0 x 0.8 cm). Borderline reduced LV systolic function (LVEF 55%) and normal RV systolic function (RVEF 75%). She reports feeling well today. She denies any chest pain/pressures, dizziness, lightheadedness, shortness of breath, leg/ankle swelling,  PND, orthopnea, or syncopal symptoms. Presenting 12 lead ECG shows SR with PACs Vent Rate 59 bpm,  ms, QRS 76 ms, QTc 417 ms. Current cardiac medications include: Xarelto and Labetalol.     She had placement of abbi valve and LPA stent in 4/2018. An echo from 5/2023 showed peak gradient across prosthetic PV is 9 mmHg and mild PI. Normal biventricular function.  She had some palpitations over the summer, reportedly had AFL in 6/2023.  She reports that at that time she had gotten the flu and was very sick, had to take Tamiflu which made her nauseous and then 2 weeks later she had a episode of AF/AFL requiring DCCV at [facility]. aA zio patch monitor from 7/6/23-7/11/23 showed 1 NSVT 4 beats and 57 PSVT up to 19.7 seconds and 7.9% PAC burden. 6 symptom activations showed sinus with isolated ectopies. She reports feeling some nausea and orthostasis him along with fatigue since labetalol was increased. She denies chest discomfort, palpitations, abdominal fullness/bloating or peripheral edema, shortness of breath, paroxysmal nocturnal dyspnea, orthopnea, lightheadedness, dizziness, pre-syncope, or syncope. Presenting 12 lead ECG shows SR Vent Rate 79 bpm,  ms, QRS 74 ms, QTc 463 ms.  Current cardiac medications include: Labetalol, Hydrochlorothiazide, ASA, and Eliquis.         I have reviewed and updated the patient's Past Medical History, Social History, Family History and Medication List.     Cardiographics (Personally Reviewed) :   5/9/23 Echo:   ##### CONCLUSIONS #####  History of pulmonary valvuloplasty 1962, 1964, 1983.  ASD closure 1983.  Placement of Abbi pulmonary valve and LPA stent 4/2018.  The peak gradient across the prosthetic pulmonary valve is 9 mmHg. Mild pulmonary valve insufficiency. Trivial aortic valve insufficiency. There are no evident shunts. The left and right ventricles have normal chamber size, wall thickness, and systolic function. There is a small posterior pericardial  "effusion with increased acoustic density.    10/13/17 CMR:  1. The LV is normal in cavity size and wall thickness. The global systolic function is borderline reduced. The LVEF is 55%. There are no regional wall motion abnormalities.  2. The RV is normal in cavity size. The global systolic function is normal. The RVEF is 75%.   3. Moderate right atrial enlargement. Normal left atrial size.  4. There is moderate to severe pulmonic insufficiency. The pulmonic valve is tricuspid. There is restricted closure of the right cusp.  5. Late gadolinium enhancement imaging shows no MI, fibrosis or infiltrative disease.   MRA Pulmonary Artery  1. The main pulmonary artery and proximal right pulmonary artery are normal size. There is a severe focal stenosis of the proximal left pulmonary artery (the lumen measures 1.0 x 0.8 cm).  CONCLUSIONS: Moderate to severe pulmonic insufficiency due to restricted closure of the right pulmonic cusp. There is branch stenosis of the proximal left pulmonary artery (1.0 x 0.8 cm). Borderline reduced LV systolic function (LVEF 55%) and normal RV systolic function (RVEF 75%).       Physical Examination   BP (!) 143/88 (BP Location: Left arm, Patient Position: Sitting, Cuff Size: Adult Regular)   Pulse 82   Ht 1.695 m (5' 6.73\")   Wt 79.1 kg (174 lb 6.4 oz)   LMP 04/06/2018   SpO2 98%   BMI 27.53 kg/m    Wt Readings from Last 3 Encounters:   05/09/23 83 kg (182 lb 14.4 oz)   04/13/21 80.6 kg (177 lb 9.6 oz)   04/12/19 70.8 kg (156 lb 1.6 oz)     CONSITUTIONAL: no acute distress  HEENT: no icterus, no redness or discharge, neck supple  CV: no visible edema of visualized extremities. No JVD.   RESPIRATORY: respirations nonlabored, no cough  NEURO: AA&Ox3, speech fluent/appropriate, motor grossly nonfocal  PSYCH: cooperative, affect appropriate  DERM: no rashes on visualized face/neck/upper extremities         Medications  Allergies   Current Outpatient Medications   Medication Sig Dispense " Refill    amoxicillin (AMOXIL) 500 MG capsule 2,000 mg (4 tablets) 30-60 min prior to dental work 4 capsule 3    apixaban ANTICOAGULANT (ELIQUIS) 5 MG tablet Take 1 tablet (5 mg) by mouth 2 times daily 80 tablet 0    aspirin 81 MG tablet Take 1 tablet (81 mg) by mouth daily 90 tablet 3    cyanocolbalamin (VITAMIN  B-12) 100 MCG tablet Take 100 mcg by mouth daily      ferrous sulfate (IRON) 325 (65 FE) MG tablet Take 325 mg by mouth every other day      hydrochlorothiazide (HYDRODIURIL) 12.5 MG tablet Take 1 tablet (12.5 mg) by mouth daily 90 tablet 0    labetalol (NORMODYNE) 200 MG tablet Take 1.5 tablets (300 mg) by mouth 3 times daily 410 tablet 3    Vitamin D, Cholecalciferol, 25 MCG (1000 UT) CAPS Pt taking BID      No Known Allergies      Lab Results (Personally Reviewed)    Chemistry/lipid CBC Cardiac Enzymes/BNP/TSH/INR   Lab Results   Component Value Date    BUN 9.2 05/09/2023     05/09/2023    CO2 29 05/09/2023     Creatinine   Date Value Ref Range Status   05/09/2023 1.01 (H) 0.51 - 0.95 mg/dL Final   04/27/2018 0.87 0.52 - 1.04 mg/dL Final       Lab Results   Component Value Date    CHOL 198 05/09/2023    HDL 65 05/09/2023     (H) 05/09/2023      Lab Results   Component Value Date    WBC 4.8 05/09/2023    HGB 13.3 05/09/2023    HCT 39.9 05/09/2023    MCV 93 05/09/2023     05/09/2023    Lab Results   Component Value Date     02/13/2014    TSH 3.61 10/13/2017    INR 1.1 02/09/2017        The patient states understanding and is agreeable with the plan.   Rajiv Mitchell MD [facility]  Cardiology - Electrophysiology          Total time spent on patient visit, reviewing notes, imaging, labs, orders, and completing necessary documentation: 45 minutes.

## 2023-10-06 NOTE — NURSING NOTE
Chief Complaint   Patient presents with    New Patient     New congential heart        Vitals were taken, medications reconciled and EKG performed.    Iqra Louis CMA  1:40 PM

## 2023-10-06 NOTE — LETTER
10/6/2023      RE: Heydi Kelley  [address] [facility] [address]       Dear Colleague,    Thank you for the opportunity to participate in the care of your patient, Heydi Kelley, at the [facility] [place] [facility]. Please see a copy of my visit note below.      ELECTROPHYSIOLOGY CLINIC VISIT    Assessment/Recommendations   Assessment/Plan:    Ms. 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     Paroxsymal Atrial Fibrillation/Flutter:   1. Stroke Prophylaxis:  CHADSVASC=+HTN, +gender  2, corresponding to a 2.2% annual stroke / systemic emolism event rate. indicating need for long term oral anticoagulation.  She is appropriately on Xarelto. No bleeding issues.   2. Rate Control: Continue labetalol, decrease to 200 mg daily.   3. Rhythm Control: Cardioversion, Antiarrhythmics and/or ablation are options for rhythm control. She has had DCCV for AF 7/2016 and DCCV for AFL 2/2017 and then in 6/2023. We discussed that is she developed frequent episodes of AF we would consider starting AAT. If she develops recurrent AFL, we would prefer ablation for management. At this time, we would not perform any further interventions given infrequency of her occurances. If increased recurrences, then we should consider AAT versus ablation therapy  4. Risk Factor Management: maintain tight BP control, and BEV evaluation as indicated.       Follow up on an as needed basis.       History of Present Illness/Subjective    Ms. Heydi Kelley is a 63 year old female who comes in today for EP follow-up of  AF/AFL.    Ms. 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      EP Visit 10/13/17: She has now known severe pulmonary valve insufficiency. Her RV size and function and remained normal. She is without activity limitations. She began to develop AF/AFL in 2016. She was seen by EP at [facility] who started her on Xarelto. She then underwent cardiac event monitoring which showed brief paroxysms of AT. She then had recurrences of AF/AFL and had DCCV in 7/2016 and 2/2017. Given her atrial arrhythmias and severe RA enlargement, decision was made to replace pulmonary valve. This was scheduled to be done in 1/2017 at [facility], but when she followed up in just prior to valve surgery decision was made to cancel surgery considering no recurrent events since her DCCV in 7/2016. She then had another episode of AF/AFL requiring DCCV in 2/2017. She reports symptoms of palpitations and decreased stamina when in AF/AFL. Of note, she did present to OSH ER once with symptoms of palpitation and was found to be in sinus; however, the other times she was in AF/AFL. Review of 12 lead ECGs show she has had both AF (7/2016) and AFL (2/2017). Her last cardiopulmonary stress test, however, was at [facility] in 02/2014, and she went 6.7 minutes with a functional aerobic capacity of 72.8%.  Her RER was 1.15, peak VO2 was 73% of predicted at 20.1 mL/kg/minute.  She had normal blood pressure and heart rate response to exercise. She has had longstanding HTN which has been under adequate control with labetalol. CMRI today shows moderate to severe pulmonic insufficiency due to restricted closure of the  right pulmoniccusp. There is branch stenosis of the proximal left pulmonary artery (1.0 x 0.8 cm). Borderline reduced LV systolic function (LVEF 55%) and normal RV systolic function (RVEF 75%). She reports feeling well today. She denies any chest pain/pressures, dizziness, lightheadedness, shortness of breath, leg/ankle swelling, PND, orthopnea, or syncopal symptoms. Presenting 12 lead ECG shows SR with PACs Vent Rate 59 bpm,  ms, QRS 76 ms, QTc 417 ms. Current cardiac medications include: Xarelto and Labetalol.     She had placement of abbi valve and LPA stent in 4/2018. An echo from 5/2023 showed peak gradient across prosthetic PV is 9 mmHg and mild PI. Normal biventricular function.  She had some palpitations over the summer, reportedly had AFL in 6/2023.  She reports that at that time she had gotten the flu and was very sick, had to take Tamiflu which made her nauseous and then 2 weeks later she had a episode of AF/AFL requiring DCCV at [facility]. aA zio patch monitor from 7/6/23-7/11/23 showed 1 NSVT 4 beats and 57 PSVT up to 19.7 seconds and 7.9% PAC burden. 6 symptom activations showed sinus with isolated ectopies. She reports feeling some nausea and orthostasis him along with fatigue since labetalol was increased. She denies chest discomfort, palpitations, abdominal fullness/bloating or peripheral edema, shortness of breath, paroxysmal nocturnal dyspnea, orthopnea, lightheadedness, dizziness, pre-syncope, or syncope. Presenting 12 lead ECG shows SR Vent Rate 79 bpm,  ms, QRS 74 ms, QTc 463 ms.  Current cardiac medications include: Labetalol, Hydrochlorothiazide, ASA, and Eliquis.         I have reviewed and updated the patient's Past Medical History, Social History, Family History and Medication List.     Cardiographics (Personally Reviewed) :   5/9/23 Echo:   ##### CONCLUSIONS #####  History of pulmonary valvuloplasty 1962, 1964, 1983.  ASD closure 1983.  Placement of Abbi pulmonary  "valve and LPA stent 4/2018.  The peak gradient across the prosthetic pulmonary valve is 9 mmHg. Mild pulmonary valve insufficiency. Trivial aortic valve insufficiency. There are no evident shunts. The left and right ventricles have normal chamber size, wall thickness, and systolic function. There is a small posterior pericardial effusion with increased acoustic density.    10/13/17 CMR:  1. The LV is normal in cavity size and wall thickness. The global systolic function is borderline reduced. The LVEF is 55%. There are no regional wall motion abnormalities.  2. The RV is normal in cavity size. The global systolic function is normal. The RVEF is 75%.   3. Moderate right atrial enlargement. Normal left atrial size.  4. There is moderate to severe pulmonic insufficiency. The pulmonic valve is tricuspid. There is restricted closure of the right cusp.  5. Late gadolinium enhancement imaging shows no MI, fibrosis or infiltrative disease.   MRA Pulmonary Artery  1. The main pulmonary artery and proximal right pulmonary artery are normal size. There is a severe focal stenosis of the proximal left pulmonary artery (the lumen measures 1.0 x 0.8 cm).  CONCLUSIONS: Moderate to severe pulmonic insufficiency due to restricted closure of the right pulmonic cusp. There is branch stenosis of the proximal left pulmonary artery (1.0 x 0.8 cm). Borderline reduced LV systolic function (LVEF 55%) and normal RV systolic function (RVEF 75%).       Physical Examination   BP (!) 143/88 (BP Location: Left arm, Patient Position: Sitting, Cuff Size: Adult Regular)   Pulse 82   Ht 1.695 m (5' 6.73\")   Wt 79.1 kg (174 lb 6.4 oz)   LMP 04/06/2018   SpO2 98%   BMI 27.53 kg/m    Wt Readings from Last 3 Encounters:   05/09/23 83 kg (182 lb 14.4 oz)   04/13/21 80.6 kg (177 lb 9.6 oz)   04/12/19 70.8 kg (156 lb 1.6 oz)     CONSITUTIONAL: no acute distress  HEENT: no icterus, no redness or discharge, neck supple  CV: no visible edema of visualized " extremities. No JVD.   RESPIRATORY: respirations nonlabored, no cough  NEURO: AA&Ox3, speech fluent/appropriate, motor grossly nonfocal  PSYCH: cooperative, affect appropriate  DERM: no rashes on visualized face/neck/upper extremities         Medications  Allergies   Current Outpatient Medications   Medication Sig Dispense Refill    amoxicillin (AMOXIL) 500 MG capsule 2,000 mg (4 tablets) 30-60 min prior to dental work 4 capsule 3    apixaban ANTICOAGULANT (ELIQUIS) 5 MG tablet Take 1 tablet (5 mg) by mouth 2 times daily 80 tablet 0    aspirin 81 MG tablet Take 1 tablet (81 mg) by mouth daily 90 tablet 3    cyanocolbalamin (VITAMIN  B-12) 100 MCG tablet Take 100 mcg by mouth daily      ferrous sulfate (IRON) 325 (65 FE) MG tablet Take 325 mg by mouth every other day      hydrochlorothiazide (HYDRODIURIL) 12.5 MG tablet Take 1 tablet (12.5 mg) by mouth daily 90 tablet 0    labetalol (NORMODYNE) 200 MG tablet Take 1.5 tablets (300 mg) by mouth 3 times daily 410 tablet 3    Vitamin D, Cholecalciferol, 25 MCG (1000 UT) CAPS Pt taking BID      No Known Allergies      Lab Results (Personally Reviewed)    Chemistry/lipid CBC Cardiac Enzymes/BNP/TSH/INR   Lab Results   Component Value Date    BUN 9.2 05/09/2023     05/09/2023    CO2 29 05/09/2023     Creatinine   Date Value Ref Range Status   05/09/2023 1.01 (H) 0.51 - 0.95 mg/dL Final   04/27/2018 0.87 0.52 - 1.04 mg/dL Final       Lab Results   Component Value Date    CHOL 198 05/09/2023    HDL 65 05/09/2023     (H) 05/09/2023      Lab Results   Component Value Date    WBC 4.8 05/09/2023    HGB 13.3 05/09/2023    HCT 39.9 05/09/2023    MCV 93 05/09/2023     05/09/2023    Lab Results   Component Value Date     02/13/2014    TSH 3.61 10/13/2017    INR 1.1 02/09/2017        The patient states understanding and is agreeable with the plan.   Rajiv Mitchell MD [facility]  Cardiology - Electrophysiology          Total time spent on patient visit,  reviewing notes, imaging, labs, orders, and completing necessary documentation: 45 minutes.

## 2023-10-06 NOTE — PATIENT INSTRUCTIONS
You were seen today in the Adult Congenital and Cardiovascular Genetics Clinic at the [facility].    Cardiology Providers you saw during your visit:  Rajiv Mitchell MD    Diagnosis:  a flutter    Results:  Rajiv Mitchell MD reviewed the results of your EKG and summer ZioPatch testing today in clinic.    Recommendations for you:    DECREASE labetalol to 200mg three times daily.      General Cardiac Recommendations:  Continue to eat a heart healthy, low salt diet.  Continue to get 20-30 minutes of aerobic activity, 4-5 days per week.  Examples of aerobic activity include walking, running, swimming, cycling, etc.  Continue to observe good oral hygiene, with regular dental visits.      SBE prophylaxis:   Yes__x__  No____    If YES is checked, follow the recommendations outlined below:  Take antibiotic(s) prior to recommended dental procedures and procedures on the respiratory tract or with infected skin, muscle or bones. SBE prophylaxis is not needed for routine GI and  procedures (ie. Colonoscopy or vaginal delivery)  Observe good oral hygiene daily, as advised by your dentist. Get regular professional dental care.  Keep cuts clean.  Infections should be treated promptly.  Symptoms of Infective Endocarditis could include: fever lasting more than 4-5 days or a recurrent fever that initially resolves but returns within 1-2 days)      Exercise restrictions:   Yes__X__  No____         If yes, list restrictions:  Must be allowed to rest if fatigued or SOB      FASTING CHOLESTEROL was checked in the last 5 years YES__x_  NO___ (2023)  If no, please follow up with your primary care physician. You should have a cholesterol screening every 5 years.      Follow-up:  Follow up with Dr. Mitchell as needed.    If you have questions or concerns please contact us at:    Debra Robb RN, BSN    Jennifer Alvarez (Scheduling)  Nurse Care Coordinator     Clinic   Adult Congenital and CV Genetics   Adult  Congenital and CV Genetic  [facility]   [facility]  (P) [phone]     (P) [phone]  (F) [phone]     (F) [phone]      For after hours urgent needs, call [phone] and ask to speak to the Adult Congenital Physician on call.  Mention Job Code 0401.    For emergencies call 911.    [facility]  Mail Code [id number]  [address] [id number]

## 2023-10-09 LAB
ATRIAL RATE - MUSE: 79 BPM
DIASTOLIC BLOOD PRESSURE - MUSE: NORMAL MMHG
INTERPRETATION ECG - MUSE: NORMAL
P AXIS - MUSE: 57 DEGREES
PR INTERVAL - MUSE: 184 MS
QRS DURATION - MUSE: 74 MS
QT - MUSE: 404 MS
QTC - MUSE: 463 MS
R AXIS - MUSE: 79 DEGREES
SYSTOLIC BLOOD PRESSURE - MUSE: NORMAL MMHG
T AXIS - MUSE: 58 DEGREES
VENTRICULAR RATE- MUSE: 79 BPM

## 2023-11-18 ENCOUNTER — HEALTH MAINTENANCE LETTER (OUTPATIENT)
Age: 63
End: 2023-11-18

## 2024-03-05 ENCOUNTER — MYC MEDICAL ADVICE (OUTPATIENT)
Dept: CARDIOLOGY | Facility: CLINIC | Age: 64
End: 2024-03-05
Payer: COMMERCIAL

## 2024-04-09 ENCOUNTER — TELEPHONE (OUTPATIENT)
Dept: CARDIOLOGY | Facility: CLINIC | Age: 64
End: 2024-04-09
Payer: COMMERCIAL

## 2024-04-09 DIAGNOSIS — Z87.74 S/P ATRIAL SEPTAL DEFECT CLOSURE: Primary | ICD-10-CM

## 2024-04-09 DIAGNOSIS — Z87.74 S/P PDA REPAIR: ICD-10-CM

## 2024-04-09 DIAGNOSIS — Z98.890 S/P PULMONARY VALVULOTOMY: ICD-10-CM

## 2024-04-09 DIAGNOSIS — I10 ESSENTIAL HYPERTENSION: ICD-10-CM

## 2024-04-09 DIAGNOSIS — I48.92 ATRIAL FLUTTER, UNSPECIFIED TYPE (H): ICD-10-CM

## 2024-04-09 DIAGNOSIS — Q24.9 CONGENITAL HEART DISEASE: ICD-10-CM

## 2024-04-09 DIAGNOSIS — I10 BENIGN ESSENTIAL HYPERTENSION: ICD-10-CM

## 2024-04-09 NOTE — TELEPHONE ENCOUNTER
Returned pt call to schedule appts. Pt has questions regarding travel to [place], exercise, if her upcoming echo will IV contrast and if the timeframe in between her last appt and her follow-up is okay. I informed the pt that I would reach ou the RN and let them know her questions and concerns.

## 2024-04-11 RX ORDER — LABETALOL 200 MG/1
200 TABLET, FILM COATED ORAL 3 TIMES DAILY
Qty: 270 TABLET | Refills: 3 | Status: SHIPPED | OUTPATIENT
Start: 2024-04-11

## 2024-04-11 RX ORDER — HYDROCHLOROTHIAZIDE 12.5 MG/1
12.5 TABLET ORAL DAILY
Qty: 90 TABLET | Refills: 3 | Status: SHIPPED | OUTPATIENT
Start: 2024-04-11

## 2024-04-11 NOTE — TELEPHONE ENCOUNTER
Spoke with patient about slowly increasing exercise now to condition heart for travel (states that she has not exercised since last summer due to fears of tachycardia)-- discussed with patient s/s of a flutter or other issues such as hypotension or tachycardia. Reviewed with patient how and when to take HR and BP at home. Informed patient that appointment with Dr. Mccollum in September is OK, and that she can be placed on the cancellation list should an early opening arise. Patient states understanding. Labetalol and hydrochlorothiazide were refilled and sent to pharmacy of choice.

## 2024-06-15 ENCOUNTER — HEALTH MAINTENANCE LETTER (OUTPATIENT)
Age: 64
End: 2024-06-15

## 2024-09-01 NOTE — PATIENT INSTRUCTIONS
"Thank you for visiting the Adult Congenital and Cardiovascular Genetics Clinic at the [facility].    Cardiology Providers you saw during your visit:  JOSUE Mccollum MD    Diagnosis:  ASD, a flutter    Results:  JOSUE Mccollum MD reviewed the results of your ECHO testing today in clinic.    If you have questions or concerns, please call us at [phone] or contact us through [employer].  ______________________________________________________________________________    Recommendations from your Cardiology Provider TODAY:    No changes today      ____________________________________________________________________________________________________    Follow-up Plan:  Follow up with Dr Mccollum in 1 year with a 7 day zio patch, fasting labs (if not completed already) and CT coronary calcium scan prior    ____________________________________________________________________________________________________    If you have questions or concerns, please call us at [phone] or contact us through [employer].    Debra Robb RN, BSN    Iqra Louis (Scheduling)  Nurse Care Coordinator     Clinic   Adult Congenital and CV Genetics  Adult Congenital   [facility]  [facility]  (P) [phone]     (P) [phone]  (F) [phone]     (F) [phone]        For after hours urgent needs, call [phone] and ask to speak to the \"On-Call Cardiologist.\"    For emergencies call [phone].    ____________________________________________________________________________________________________    Additional Important Information for Your Heart Health      General Cardiac Recommendations:  Continue to eat a heart healthy, low salt diet.  Continue to get 20-30 minutes of aerobic activity, 4-5 days per week.  Examples of aerobic activity include walking, running, swimming, cycling, etc.  Continue to observe good oral hygiene, with regular dental " visits.        SBE prophylaxis (antibiotics needed before dental appointments):   Yes__X__  No____    SBE prophylaxis applies to patients with certain heart conditions who are recommended to take antibiotics before dental appointments and other specific procedures. These antibiotics are to help prevent an infection of the heart (endocarditis) that certain patients are at higher risk of developing. The guidelines used come from the American Heart Association and are periodically updated.    If YES is checked, follow the recommendations outlined below:  Take antibiotic(s) prior to recommended dental procedures and procedures involving the respiratory tract or procedures involving infections of the skin, muscle or bones.   SBE prophylaxis is not needed for routine gastrointestinal and genitourinary procedures (ie. Colonoscopy or vaginal delivery)  Observe good oral hygiene daily, as advised by your dentist. Get regular professional dental care.  Keep cuts and other open injuries clean.  All infections should be treated as soon as possible.  Symptoms of Infective Endocarditis could include: fever lasting more than 4-5 days or a recurrent fever that initially resolves but returns within 1-2 days). Call us a [phone] if you are experiencing these symptoms.        FASTING CHOLESTEROL was checked in the last 5 years YES__X__  NO____ (2023)  If no, please follow up with your primary care physician. You should have a cholesterol screening every 5 years at minimum, and every year if taking a medication for your cholesterol levels.

## 2024-09-03 ENCOUNTER — HOSPITAL ENCOUNTER (OUTPATIENT)
Dept: CARDIOLOGY | Facility: CLINIC | Age: 64
Discharge: HOME | End: 2024-09-03
Attending: INTERNAL MEDICINE
Payer: COMMERCIAL

## 2024-09-03 ENCOUNTER — OFFICE VISIT (OUTPATIENT)
Dept: CARDIOLOGY | Facility: CLINIC | Age: 64
End: 2024-09-03
Attending: INTERNAL MEDICINE
Payer: COMMERCIAL

## 2024-09-03 VITALS
DIASTOLIC BLOOD PRESSURE: 80 MMHG | BODY MASS INDEX: 27.77 KG/M2 | WEIGHT: 175.9 LBS | HEART RATE: 81 BPM | OXYGEN SATURATION: 97 % | SYSTOLIC BLOOD PRESSURE: 140 MMHG

## 2024-09-03 DIAGNOSIS — Q24.9 CONGENITAL HEART DISEASE: ICD-10-CM

## 2024-09-03 DIAGNOSIS — Z87.74 S/P ATRIAL SEPTAL DEFECT CLOSURE: ICD-10-CM

## 2024-09-03 DIAGNOSIS — I48.92 ATRIAL FLUTTER, UNSPECIFIED TYPE (H): ICD-10-CM

## 2024-09-03 DIAGNOSIS — Z98.890 S/P PULMONARY VALVULOTOMY: ICD-10-CM

## 2024-09-03 DIAGNOSIS — I48.3 TYPICAL ATRIAL FLUTTER (H): ICD-10-CM

## 2024-09-03 DIAGNOSIS — R73.09 ELEVATED GLUCOSE LEVEL: Primary | ICD-10-CM

## 2024-09-03 DIAGNOSIS — Q25.6 STENOSIS OF LEFT PULMONARY ARTERY: ICD-10-CM

## 2024-09-03 DIAGNOSIS — Z87.74 S/P PDA REPAIR: ICD-10-CM

## 2024-09-03 DIAGNOSIS — Z13.220 SCREENING FOR HYPERLIPIDEMIA: ICD-10-CM

## 2024-09-03 DIAGNOSIS — I10 BENIGN ESSENTIAL HYPERTENSION: ICD-10-CM

## 2024-09-03 PROCEDURE — 99215 OFFICE O/P EST HI 40 MIN: CPT | Mod: 25 | Performed by: INTERNAL MEDICINE

## 2024-09-03 PROCEDURE — 93005 ELECTROCARDIOGRAM TRACING: CPT

## 2024-09-03 PROCEDURE — 93303 ECHO TRANSTHORACIC: CPT | Mod: 26 | Performed by: PEDIATRICS

## 2024-09-03 PROCEDURE — 99417 PROLNG OP E/M EACH 15 MIN: CPT | Mod: 25 | Performed by: INTERNAL MEDICINE

## 2024-09-03 PROCEDURE — 93325 DOPPLER ECHO COLOR FLOW MAPG: CPT | Mod: 26 | Performed by: PEDIATRICS

## 2024-09-03 PROCEDURE — 93325 DOPPLER ECHO COLOR FLOW MAPG: CPT

## 2024-09-03 PROCEDURE — 99213 OFFICE O/P EST LOW 20 MIN: CPT | Mod: 25 | Performed by: INTERNAL MEDICINE

## 2024-09-03 PROCEDURE — 93320 DOPPLER ECHO COMPLETE: CPT | Mod: 26 | Performed by: PEDIATRICS

## 2024-09-03 ASSESSMENT — PAIN SCALES - GENERAL: PAINLEVEL: NO PAIN (0)

## 2024-09-03 NOTE — LETTER
9/3/2024      RE: Heydi Kelley  [address] [address] [facility] [id number]       Dear Colleague,    Thank you for the opportunity to participate in the care of your patient, Heydi Kelley, at the [facility] [place] [facility]. Please see a copy of my visit note below.    CARDIOLOGY CONSULTATION:    Ms. Kelley is a very pleasant 64-year-old woman whose past medical history is significant for congenital pulmonary stenosis, atrial septal defect and PDA, undergoing pulmonary valvuloplasty in ,  and  through a median sternotomy.  She also has a history of patent ductus arteriosus that was ligated in  and atrial septal defect that was closed in .  Heydi underwent a Jacqueline valve with LPA stent on 2018.  She had a 22 mm Jacqueline valve placed and placement of an LPA stent at that time. Her postoperative course was complicated by a small dissection of a branch of the left lower pulmonary artery, but she did well with no subsequent issues.      I last saw her in 2023.  CPX prior to valve 2018, and after valve, 2018, showed improvement going 65% of predicted after compared to 62% of predicted prior.  Her RER was actually less so there was an overall improvement.   She takes abx before the dentist and a baby ASA.      Weight was up 15 lbs when I saw her in  compared to  and it increased 6 lbs more in , and she was not exercising (183).  She was down to 175 lbs 10/2023, where she is today.   She is still not exercising regularly.  Her  last year felt she was depressed with a lot of anxiety.  Her mom  in  after a hip fracture and it is after that that she really started to do more poorly and be more depressed.  She did not want to start medications.  She is much better this year. She thinks traveling helped ([place] and [place]).   Her  is dealing with Crohns and arthritis that is limiting him and his  ability to travel.      We did a zio patch 7/2023 and she had many short runs of SVT (longest 19 seconds at rate of 126). She also had a run of reported flutter 6/2023.  She saw Dr. Mitchell 10/2023.  She was on Xarelto, which was continued.  Labetalol was decreased from 300 mg TID to 200 mg TID due to fatigue; she did not note any increase in palpitations after this change.  He said would consider A-tach ablation if she had more recurrences in the future.   Her BP was elevated last year so we started hydrochlorothiazide 12.5mg daily.  BP is elevated today 140/80 on recheck, but she brings her home BP reads and she BP cuff was calibrated and accurate, and her BP is in the high 90s to low 100s at home.     I reviewed her echo today personally, and it is stable with no significant gradient across her pulmonary valve and mild PI with normal biventricular chamber size and function. She had a small pericardial effusion.   She had labs done in her home clinic in [place] and her LDL was 90 with normal fasting glucose.  Her renal function has been stable since the earliest lab available, 2014, with GFR 55-60 and creatinine 1-1.1.  She had some protein in her urine (100) in March, which she was concerned about. They are rechecking it.  She has osteopenia.       PAST MEDICAL HISTORY:  Past Medical History:   Diagnosis Date     Atrial flutter (H)      Hyperlipidemia      Hypertension      S/P atrial septal defect closure      S/P PDA repair      S/P pulmonary valvulotomy        CURRENT MEDICATIONS:  Current Outpatient Medications   Medication Sig Dispense Refill     amoxicillin (AMOXIL) 500 MG capsule 2,000 mg (4 tablets) 30-60 min prior to dental work 4 capsule 3     apixaban ANTICOAGULANT (ELIQUIS) 5 MG tablet Take 1 tablet (5 mg) by mouth 2 times daily (Patient not taking: Reported on 10/6/2023) 80 tablet 0     aspirin 81 MG tablet Take 1 tablet (81 mg) by mouth daily 90 tablet 3     cyanocolbalamin (VITAMIN  B-12) 100 MCG  tablet Take 100 mcg by mouth daily       ferrous sulfate (IRON) 325 (65 FE) MG tablet Take 325 mg by mouth every other day (Patient not taking: Reported on 10/6/2023)       hydroCHLOROthiazide 12.5 MG tablet Take 1 tablet (12.5 mg) by mouth daily 90 tablet 3     labetalol (NORMODYNE) 200 MG tablet Take 1 tablet (200 mg) by mouth 3 times daily 270 tablet 3     Vitamin D, Cholecalciferol, 25 MCG (1000 UT) CAPS Pt taking BID (Patient not taking: Reported on 10/6/2023)       vitamin D3 (CHOLECALCIFEROL) 50 mcg (2000 units) tablet Take 1 tablet by mouth daily         PAST SURGICAL HISTORY:  Past Surgical History:   Procedure Laterality Date     pulmonary valvulotomy         ALLERGIES  Patient has no known allergies.    FAMILY HX:  No family history on file.    SOCIAL HX:  Social History     Socioeconomic History     Marital status:    Tobacco Use     Smoking status: Never     Smokeless tobacco: Never   Substance and Sexual Activity     Alcohol use: No     Drug use: No       ROS:  Constitutional: No fever, chills, or sweats. No weight gain/loss.   ENT: No visual disturbance, ear ache, epistaxis, sore throat.   Allergies/Immunologic: Negative.   Respiratory: No cough, hemoptysis.   Cardiovascular: As per HPI.   GI: No nausea, vomiting, hematemesis, melena, or hematochezia.   : No urinary frequency, dysuria, or hematuria.   Integument: Negative.   Psychiatric: Negative.   Neuro: Negative.   Endocrinology: Negative.   Musculoskeletal: No myalgia.    VITAL SIGNS:  BP (!) 157/85 (BP Location: Right arm, Patient Position: Chair, Cuff Size: Adult Regular)   Pulse 81   Wt 79.8 kg (175 lb 14.4 oz)   LMP 04/06/2018   SpO2 97%   BMI 27.77 kg/m    Body mass index is 27.77 kg/m .  Wt Readings from Last 2 Encounters:   09/03/24 79.8 kg (175 lb 14.4 oz)   10/06/23 79.1 kg (174 lb 6.4 oz)       PHYSICAL EXAM  Heydi Kelley IS A 64 year old female.in no acute distress.  HEENT: Unremarkable.  Neck: JVP normal.   Lungs: CTA.   "Cor: RRR. Normal S1 and S2.  No murmur, rub, or gallop.   Abd: Soft.  Extremities: No C/C/E.    Neuro: Grossly intact.    LABS    Lab Results   Component Value Date    WBC 4.8 05/09/2023    WBC 3.4 04/27/2018     Lab Results   Component Value Date    RBC 4.28 05/09/2023    RBC 3.71 04/27/2018     Lab Results   Component Value Date    HGB 13.3 05/09/2023    HGB 11.8 04/27/2018     Lab Results   Component Value Date    HCT 39.9 05/09/2023    HCT 35.8 04/27/2018     No components found for: \"MCT\"  Lab Results   Component Value Date    MCV 93 05/09/2023    MCV 97 04/27/2018     Lab Results   Component Value Date    MCH 31.1 05/09/2023    MCH 31.8 04/27/2018     Lab Results   Component Value Date    MCHC 33.3 05/09/2023    MCHC 33.0 04/27/2018     Lab Results   Component Value Date    RDW 12.0 05/09/2023    RDW 12.9 04/27/2018     Lab Results   Component Value Date     05/09/2023     04/27/2018      Recent Labs   Lab Test 05/09/23  0733 04/27/18  1232    139   POTASSIUM 4.3 4.2   CHLORIDE 103 104   CO2 29 28   ANIONGAP 8 7   * 88   BUN 9.2 10   CR 1.01* 0.87   KEMAR 9.7 8.9     Recent Labs   Lab Test 05/09/23  0733 03/08/23  0752 04/07/18  0308 04/06/18  1900   CHOL 198  --   --  126   HDL 65  --   --  53   *  --   --  51   TRIG 122 102   < > 110   NHDL 133*  --   --  73    < > = values in this interval not displayed.          IMPRESSION, REPORT, PLAN:   1.  Congenital pulmonary valve stenosis, status post pulmonary valvuloplasty in 1962, 1964 and 1983 through a median sternotomy with severe pulmonary insufficiency and normal right ventricular chamber size and function.   2.  Atrial arrhythmias, initial event 08/2016 with atrial fibrillation/flutter, subsequent event 02/2017, now on Xarelto and labetalol.  PACs and short runs of SVT on monitor 2/2020  3.  History of PDA, status post ligation in 1968 through a lateral thoracotomy at the [facility].   4.  ASD closure in 1983 at " the [facility].   5.  Hypertension  6.  LPA stenosis, status post LPA stent 04/06/2018 [facility].   7.  Severe pulmonary insufficiency, status post Jacqueline valve 04/06/2018 with a 22 mm valve.   8.  Small branch dissection of the left lower pulmonary artery at the time of the Jacqueline valve, resolved.   9.  Normal coronary arteries at time of cath.   10.  Normal cholesterol.   11.  Depression/anxiety - improved  12.  Osteopenia  13.  GFR 55-60, creatinine 1-1.1, mild protein in urine on home evaluation     It was a pleasure to see Ms. Kelley in followup.  Clinically, she is doing well from a cardiovascular standpoint.  She has not had any concerning systemic symptoms.  Her echo echo is stable. We discussed diet and exercise and she will work on walking more.  Her mood is much improved and she has lost 8 lbs.    She was concerned about her heart arteries and we discussed that on her cath in 2018 they looked good and she does not have a lot CV risk factors.  BP is controlled. No diabetes. No early CAD (dad with bypass in his 70s). Her LDL not on treatment was Ninety March 2024. She will work on exercise.  She is interested in a coronary calcium scan, but would like to wait until she returns in a year.  I think she can go to every other year echos.  We will check lipids and TSH when she returns. She will have her renal function and protein in urine checked by her primary.  I discussed that if still elevated I would be ok to switch her from hydrochlorothiazide to Lisinopril 5 mg daily.      She knows to take antibiotics before dental work.  We will plan to see her back in 1 year and will let us know if  there are any issues in the interim.  It was a pleasure to see her.  Please do not hesitate to contact me with any questions or concerns.     JOSUE Mccollum MD  55 minutes face to face documentation and review of records on day of visit      Please do not hesitate to contact me if you have any  questions/concerns.     Sincerely,     Madhu Mccollum MD

## 2024-09-03 NOTE — PROGRESS NOTES
CARDIOLOGY CONSULTATION:    Ms. Kelley is a very pleasant 64-year-old woman whose past medical history is significant for congenital pulmonary stenosis, atrial septal defect and PDA, undergoing pulmonary valvuloplasty in 2,  and  through a median sternotomy.  She also has a history of patent ductus arteriosus that was ligated in  and atrial septal defect that was closed in .  Heydi underwent a Jacqueline valve with LPA stent on 2018.  She had a 22 mm Jacqueline valve placed and placement of an LPA stent at that time. Her postoperative course was complicated by a small dissection of a branch of the left lower pulmonary artery, but she did well with no subsequent issues.      I last saw her in 2023.  CPX prior to valve 2018, and after valve, 2018, showed improvement going 65% of predicted after compared to 62% of predicted prior.  Her RER was actually less so there was an overall improvement.   She takes abx before the dentist and a baby ASA.      Weight was up 15 lbs when I saw her in  compared to  and it increased 6 lbs more in , and she was not exercising (183).  She was down to 175 lbs 10/2023, where she is today.   She is still not exercising regularly.  Her  last year felt she was depressed with a lot of anxiety.  Her mom  in  after a hip fracture and it is after that that she really started to do more poorly and be more depressed.  She did not want to start medications.  She is much better this year. She thinks traveling helped ([place] and [facility]).   Her  is dealing with Crohns and arthritis that is limiting him and his ability to travel.      We did a zio patch 2023 and she had many short runs of SVT (longest 19 seconds at rate of 126). She also had a run of reported flutter 2023.  She saw Dr. Mitchell 10/2023.  She was on Xarelto, which was continued.  Labetalol was decreased from 300 mg TID to 200 mg TID due to fatigue; she did not note any increase  in palpitations after this change.  He said would consider A-tach ablation if she had more recurrences in the future.   Her BP was elevated last year so we started hydrochlorothiazide 12.5mg daily.  BP is elevated today 140/80 on recheck, but she brings her home BP reads and she BP cuff was calibrated and accurate, and her BP is in the high 90s to low 100s at home.     I reviewed her echo today personally, and it is stable with no significant gradient across her pulmonary valve and mild PI with normal biventricular chamber size and function. She had a small pericardial effusion.   She had labs done in her home clinic in [place] and her LDL was 90 with normal fasting glucose.  Her renal function has been stable since the earliest lab available, 2014, with GFR 55-60 and creatinine 1-1.1.  She had some protein in her urine (100) in March, which she was concerned about. They are rechecking it.  She has osteopenia.       PAST MEDICAL HISTORY:  Past Medical History:   Diagnosis Date    Atrial flutter (H)     Hyperlipidemia     Hypertension     S/P atrial septal defect closure     S/P PDA repair     S/P pulmonary valvulotomy        CURRENT MEDICATIONS:  Current Outpatient Medications   Medication Sig Dispense Refill    amoxicillin (AMOXIL) 500 MG capsule 2,000 mg (4 tablets) 30-60 min prior to dental work 4 capsule 3    apixaban ANTICOAGULANT (ELIQUIS) 5 MG tablet Take 1 tablet (5 mg) by mouth 2 times daily (Patient not taking: Reported on 10/6/2023) 80 tablet 0    aspirin 81 MG tablet Take 1 tablet (81 mg) by mouth daily 90 tablet 3    cyanocolbalamin (VITAMIN  B-12) 100 MCG tablet Take 100 mcg by mouth daily      ferrous sulfate (IRON) 325 (65 FE) MG tablet Take 325 mg by mouth every other day (Patient not taking: Reported on 10/6/2023)      hydroCHLOROthiazide 12.5 MG tablet Take 1 tablet (12.5 mg) by mouth daily 90 tablet 3    labetalol (NORMODYNE) 200 MG tablet Take 1 tablet (200 mg) by mouth 3 times daily 270 tablet 3     Vitamin D, Cholecalciferol, 25 MCG (1000 UT) CAPS Pt taking BID (Patient not taking: Reported on 10/6/2023)      vitamin D3 (CHOLECALCIFEROL) 50 mcg (2000 units) tablet Take 1 tablet by mouth daily         PAST SURGICAL HISTORY:  Past Surgical History:   Procedure Laterality Date    pulmonary valvulotomy         ALLERGIES  Patient has no known allergies.    FAMILY HX:  No family history on file.    SOCIAL HX:  Social History     Socioeconomic History    Marital status:    Tobacco Use    Smoking status: Never    Smokeless tobacco: Never   Substance and Sexual Activity    Alcohol use: No    Drug use: No       ROS:  Constitutional: No fever, chills, or sweats. No weight gain/loss.   ENT: No visual disturbance, ear ache, epistaxis, sore throat.   Allergies/Immunologic: Negative.   Respiratory: No cough, hemoptysis.   Cardiovascular: As per HPI.   GI: No nausea, vomiting, hematemesis, melena, or hematochezia.   : No urinary frequency, dysuria, or hematuria.   Integument: Negative.   Psychiatric: Negative.   Neuro: Negative.   Endocrinology: Negative.   Musculoskeletal: No myalgia.    VITAL SIGNS:  BP (!) 157/85 (BP Location: Right arm, Patient Position: Chair, Cuff Size: Adult Regular)   Pulse 81   Wt 79.8 kg (175 lb 14.4 oz)   LMP 04/06/2018   SpO2 97%   BMI 27.77 kg/m    Body mass index is 27.77 kg/m .  Wt Readings from Last 2 Encounters:   09/03/24 79.8 kg (175 lb 14.4 oz)   10/06/23 79.1 kg (174 lb 6.4 oz)       PHYSICAL EXAM  Heydi Kelley IS A 64 year old female.in no acute distress.  HEENT: Unremarkable.  Neck: JVP normal.   Lungs: CTA.  Cor: RRR. Normal S1 and S2.  No murmur, rub, or gallop.   Abd: Soft.  Extremities: No C/C/E.    Neuro: Grossly intact.    LABS    Lab Results   Component Value Date    WBC 4.8 05/09/2023    WBC 3.4 04/27/2018     Lab Results   Component Value Date    RBC 4.28 05/09/2023    RBC 3.71 04/27/2018     Lab Results   Component Value Date    HGB 13.3 05/09/2023    HGB  "11.8 04/27/2018     Lab Results   Component Value Date    HCT 39.9 05/09/2023    HCT 35.8 04/27/2018     No components found for: \"MCT\"  Lab Results   Component Value Date    MCV 93 05/09/2023    MCV 97 04/27/2018     Lab Results   Component Value Date    MCH 31.1 05/09/2023    MCH 31.8 04/27/2018     Lab Results   Component Value Date    MCHC 33.3 05/09/2023    MCHC 33.0 04/27/2018     Lab Results   Component Value Date    RDW 12.0 05/09/2023    RDW 12.9 04/27/2018     Lab Results   Component Value Date     05/09/2023     04/27/2018      Recent Labs   Lab Test 05/09/23  0733 04/27/18  1232    139   POTASSIUM 4.3 4.2   CHLORIDE 103 104   CO2 29 28   ANIONGAP 8 7   * 88   BUN 9.2 10   CR 1.01* 0.87   KEMAR 9.7 8.9     Recent Labs   Lab Test 05/09/23  0733 03/08/23  0752 04/07/18  0308 04/06/18  1900   CHOL 198  --   --  126   HDL 65  --   --  53   *  --   --  51   TRIG 122 102   < > 110   NHDL 133*  --   --  73    < > = values in this interval not displayed.          IMPRESSION, REPORT, PLAN:   1.  Congenital pulmonary valve stenosis, status post pulmonary valvuloplasty in 1962, 1964 and 1983 through a median sternotomy with severe pulmonary insufficiency and normal right ventricular chamber size and function.   2.  Atrial arrhythmias, initial event 08/2016 with atrial fibrillation/flutter, subsequent event 02/2017, now on Xarelto and labetalol.  PACs and short runs of SVT on monitor 2/2020  3.  History of PDA, status post ligation in 1968 through a lateral thoracotomy at the [facility].   4.  ASD closure in 1983 at the [facility].   5.  Hypertension  6.  LPA stenosis, status post LPA stent 04/06/2018 [facility].   7.  Severe pulmonary insufficiency, status post Jacqueline valve 04/06/2018 with a 22 mm valve.   8.  Small branch dissection of the left lower pulmonary artery at the time of the Jacqueline valve, resolved.   9.  Normal coronary arteries at " time of cath.   10.  Normal cholesterol.   11.  Depression/anxiety - improved  12.  Osteopenia  13.  GFR 55-60, creatinine 1-1.1, mild protein in urine on home evaluation     It was a pleasure to see Ms. Kelley in followup.  Clinically, she is doing well from a cardiovascular standpoint.  She has not had any concerning systemic symptoms.  Her echo echo is stable. We discussed diet and exercise and she will work on walking more.  Her mood is much improved and she has lost 8 lbs.    She was concerned about her heart arteries and we discussed that on her cath in 2018 they looked good and she does not have a lot CV risk factors.  BP is controlled. No diabetes. No early CAD (dad with bypass in his 70s). Her LDL not on treatment was Ninety March 2024. She will work on exercise.  She is interested in a coronary calcium scan, but would like to wait until she returns in a year.  I think she can go to every other year echos.  We will check lipids and TSH when she returns. She will have her renal function and protein in urine checked by her primary.  I discussed that if still elevated I would be ok to switch her from hydrochlorothiazide to Lisinopril 5 mg daily.      She knows to take antibiotics before dental work.  We will plan to see her back in 1 year and will let us know if  there are any issues in the interim.  It was a pleasure to see her.  Please do not hesitate to contact me with any questions or concerns.     JOSUE Mccollum MD  55 minutes face to face documentation and review of records on day of visit

## 2024-09-03 NOTE — NURSING NOTE
Cardiac Monitors: Patient was instructed regarding the indication, function, care and prompt return of a holter  monitor. The monitor was placed on the patient with instructions regarding care of the skin electrodes and monitor, as well as documentation in the patient diary. Patient demonstrated understanding of this information and agreed to call with further questions or concerns.    Cardiac Testing: Patient given instructions regarding  CT calcium scan. Discussed purpose, preparation, procedure and when to expect results reported back to the patient. Patient demonstrated understanding of this information and agreed to call with further questions or concerns.    Labs: Patient was given results of the laboratory testing obtained today. Patient was instructed to return for the next laboratory testing in 1 year . Patient demonstrated understanding of this information and agreed to call with further questions or concerns.     Med Reconcile: Reviewed and verified all current medications with the patient. The updated medication list was printed and given to the patient.    Return Appointment: Patient given instructions regarding scheduling next clinic visit. Patient demonstrated understanding of this information and agreed to call with further questions or concerns.    Patient stated she understood all health information given and agreed to call with further questions or concerns.

## 2024-09-03 NOTE — NURSING NOTE
Chief Complaint   Patient presents with    Follow Up     ACHD (March/Paula) 09/03/2023: 64 year old female with history of ASD s/p closure, PDA s/p ligation, A flutter/ Afib, hypertension, hyperlipidemia, and pulmonary stenosis s/p pulmonary valvotomy x 3, s/p LPA stenting and Jacqueline valve placement 4/2018 presenting for follow up       Vitals were taken, medications reconciled, and EKG was performed.    Tank Ventura, EMT  10:17 AM

## 2024-09-08 LAB
ATRIAL RATE - MUSE: 68 BPM
DIASTOLIC BLOOD PRESSURE - MUSE: NORMAL MMHG
INTERPRETATION ECG - MUSE: NORMAL
P AXIS - MUSE: 47 DEGREES
PR INTERVAL - MUSE: 184 MS
QRS DURATION - MUSE: 76 MS
QT - MUSE: 440 MS
QTC - MUSE: 467 MS
R AXIS - MUSE: 73 DEGREES
SYSTOLIC BLOOD PRESSURE - MUSE: NORMAL MMHG
T AXIS - MUSE: 60 DEGREES
VENTRICULAR RATE- MUSE: 68 BPM

## 2025-04-20 DIAGNOSIS — I10 BENIGN ESSENTIAL HYPERTENSION: ICD-10-CM

## 2025-04-24 RX ORDER — HYDROCHLOROTHIAZIDE 12.5 MG/1
12.5 TABLET ORAL DAILY
Qty: 90 TABLET | Refills: 3 | Status: SHIPPED | OUTPATIENT
Start: 2025-04-24

## 2025-04-24 NOTE — TELEPHONE ENCOUNTER
Last Written Prescription:  hydroCHLOROthiazide 12.5 MG tablet   90 tablet 3 4/11/2024     ----------------------  Last Visit Date: 9-3-2024  Future Visit Date: none  ----------------------    Refill decision: Medication unable to be refilled by RN due to: Other:  overdue labs >15 months        Request from pharmacy:  Requested Prescriptions   Pending Prescriptions Disp Refills    hydroCHLOROthiazide 12.5 MG tablet 90 tablet 3     Sig: Take 1 tablet (12.5 mg) by mouth daily.       Diuretics (Including Combos) Protocol Failed - 4/24/2025  9:46 AM        Failed - Most recent blood pressure under 140/90 in past 12 months     BP Readings from Last 3 Encounters:   09/03/24 (!) 140/80   10/06/23 (!) 143/88   05/09/23 (!) 163/100           Failed - Potassium level on file in past 12 months        Failed - Has GFR on file in past 12 months and most recent value is normal   Labs completed on :  5-9-2023  Potassium  3.4 - 5.3 mmol/L 4.3     GFR Estimate  >60 mL/min/1.73m2 63        Passed - Medication is active on med list and the sig matches. RN to manually verify dose and sig if red X/fail.             Passed - Medication indicated for associated diagnosis             Passed - Recent (12 mo) or future (90 days) visit within the authorizing provider's specialty     The patient must have completed an in-person or virtual visit within the past 12 months or has a future visit scheduled within the next 90 days with the authorizing provider s specialty.  Urgent care and e-visits do not qualify as an office visit for this protocol.          Passed - Patient is age 18 or older        Passed - No active pregancy on record        Passed - No positive pregnancy test in past 12 months

## 2025-05-05 ENCOUNTER — TELEPHONE (OUTPATIENT)
Dept: CARDIOLOGY | Facility: CLINIC | Age: 65
End: 2025-05-05
Payer: COMMERCIAL

## 2025-05-05 DIAGNOSIS — I10 ESSENTIAL HYPERTENSION: ICD-10-CM

## 2025-05-05 RX ORDER — LABETALOL 200 MG/1
200 TABLET, FILM COATED ORAL 3 TIMES DAILY
Qty: 270 TABLET | Refills: 3 | Status: SHIPPED | OUTPATIENT
Start: 2025-05-05

## 2025-05-05 NOTE — TELEPHONE ENCOUNTER
[facility] Call Center    Phone Message    May a detailed message be left on voicemail: yes     Reason for Call: Medication Refill Request    Has the patient contacted the pharmacy for the refill? Yes   Name of medication being requested:   labetalol (NORMODYNE) 200 MG tablet   Provider who prescribed the medication: Madhu Mccollum MD   Pharmacy:   [facility] [id number] [address]     Date medication is needed: asap     Action Taken: Other: cardio    Travel Screening: Not Applicable    Thank you!  [facility]       Date of Service:

## 2025-07-03 ENCOUNTER — ORDERS ONLY (AUTO-RELEASED) (OUTPATIENT)
Dept: CARDIOLOGY | Facility: CLINIC | Age: 65
End: 2025-07-03
Payer: COMMERCIAL

## 2025-07-03 DIAGNOSIS — I10 BENIGN ESSENTIAL HYPERTENSION: ICD-10-CM

## 2025-07-03 DIAGNOSIS — Q24.9 CONGENITAL HEART DISEASE: ICD-10-CM

## 2025-07-03 DIAGNOSIS — Q25.6 STENOSIS OF LEFT PULMONARY ARTERY: ICD-10-CM

## 2025-07-03 DIAGNOSIS — Z87.74 S/P ATRIAL SEPTAL DEFECT CLOSURE: ICD-10-CM

## 2025-07-03 DIAGNOSIS — Z98.890 S/P PULMONARY VALVULOTOMY: ICD-10-CM

## 2025-07-03 DIAGNOSIS — I48.3 TYPICAL ATRIAL FLUTTER (H): ICD-10-CM

## 2025-07-03 DIAGNOSIS — Z87.74 S/P PDA REPAIR: ICD-10-CM

## 2025-07-06 ENCOUNTER — HEALTH MAINTENANCE LETTER (OUTPATIENT)
Age: 65
End: 2025-07-06

## (undated) RX ORDER — ONDANSETRON 2 MG/ML
INJECTION INTRAMUSCULAR; INTRAVENOUS
Status: DISPENSED
Start: 2018-04-06

## (undated) RX ORDER — CEFAZOLIN SODIUM 1 G/3ML
INJECTION, POWDER, FOR SOLUTION INTRAMUSCULAR; INTRAVENOUS
Status: DISPENSED
Start: 2018-04-06

## (undated) RX ORDER — PHENYLEPHRINE HCL IN 0.9% NACL 1 MG/10 ML
SYRINGE (ML) INTRAVENOUS
Status: DISPENSED
Start: 2018-04-06

## (undated) RX ORDER — FENTANYL CITRATE 50 UG/ML
INJECTION, SOLUTION INTRAMUSCULAR; INTRAVENOUS
Status: DISPENSED
Start: 2018-04-06

## (undated) RX ORDER — PROPOFOL 10 MG/ML
INJECTION, EMULSION INTRAVENOUS
Status: DISPENSED
Start: 2018-04-06

## (undated) RX ORDER — LIDOCAINE HYDROCHLORIDE 10 MG/ML
INJECTION, SOLUTION EPIDURAL; INFILTRATION; INTRACAUDAL; PERINEURAL
Status: DISPENSED
Start: 2018-04-06

## (undated) RX ORDER — ADENOSINE 3 MG/ML
INJECTION, SOLUTION INTRAVENOUS
Status: DISPENSED
Start: 2018-04-06

## (undated) RX ORDER — FENTANYL CITRATE 50 UG/ML
INJECTION, SOLUTION INTRAMUSCULAR; INTRAVENOUS
Status: DISPENSED
Start: 2018-04-09

## (undated) RX ORDER — PROTAMINE SULFATE 10 MG/ML
INJECTION, SOLUTION INTRAVENOUS
Status: DISPENSED
Start: 2018-04-06

## (undated) RX ORDER — EPHEDRINE SULFATE 50 MG/ML
INJECTION, SOLUTION INTRAMUSCULAR; INTRAVENOUS; SUBCUTANEOUS
Status: DISPENSED
Start: 2018-04-06

## (undated) RX ORDER — CEFAZOLIN SODIUM 2 G/100ML
INJECTION, SOLUTION INTRAVENOUS
Status: DISPENSED
Start: 2018-04-06

## (undated) RX ORDER — HEPARIN SODIUM 1000 [USP'U]/ML
INJECTION, SOLUTION INTRAVENOUS; SUBCUTANEOUS
Status: DISPENSED
Start: 2018-04-06

## (undated) RX ORDER — ASPIRIN 81 MG/1
TABLET ORAL
Status: DISPENSED
Start: 2018-04-06